# Patient Record
Sex: MALE | Race: BLACK OR AFRICAN AMERICAN | Employment: OTHER | URBAN - METROPOLITAN AREA
[De-identification: names, ages, dates, MRNs, and addresses within clinical notes are randomized per-mention and may not be internally consistent; named-entity substitution may affect disease eponyms.]

---

## 2018-04-22 ENCOUNTER — APPOINTMENT (OUTPATIENT)
Dept: GENERAL RADIOLOGY | Age: 33
DRG: 131 | End: 2018-04-22
Payer: COMMERCIAL

## 2018-04-22 ENCOUNTER — APPOINTMENT (OUTPATIENT)
Dept: CT IMAGING | Age: 33
DRG: 131 | End: 2018-04-22
Payer: COMMERCIAL

## 2018-04-22 ENCOUNTER — ANESTHESIA (OUTPATIENT)
Dept: EMERGENCY DEPT | Age: 33
DRG: 131 | End: 2018-04-22
Payer: COMMERCIAL

## 2018-04-22 ENCOUNTER — ANESTHESIA EVENT (OUTPATIENT)
Dept: EMERGENCY DEPT | Age: 33
DRG: 131 | End: 2018-04-22
Payer: COMMERCIAL

## 2018-04-22 ENCOUNTER — HOSPITAL ENCOUNTER (INPATIENT)
Age: 33
LOS: 6 days | Discharge: HOME | DRG: 131 | End: 2018-04-28
Attending: EMERGENCY MEDICINE | Admitting: SURGERY
Payer: COMMERCIAL
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
## 2018-04-22 DIAGNOSIS — E87.1 HYPONATREMIA: ICD-10-CM

## 2018-04-22 DIAGNOSIS — S02.19XA PTERYGOID PLATE FRACTURE (HCC): ICD-10-CM

## 2018-04-22 DIAGNOSIS — S02.640B OPEN FRACTURE OF RAMUS OF MANDIBLE, UNSPECIFIED LATERALITY, INITIAL ENCOUNTER (HCC): ICD-10-CM

## 2018-04-22 DIAGNOSIS — S00.83XA FACIAL HEMATOMA, INITIAL ENCOUNTER: ICD-10-CM

## 2018-04-22 PROBLEM — S02.609A: Status: ACTIVE | Noted: 2018-04-22

## 2018-04-22 PROBLEM — R60.0 SUBLINGUAL GLAND SWELLING: Status: ACTIVE | Noted: 2018-04-22

## 2018-04-22 PROBLEM — R22.0 SUBLINGUAL GLAND SWELLING: Status: ACTIVE | Noted: 2018-04-22

## 2018-04-22 LAB
ABSOLUTE EOS #: 0.06 K/UL (ref 0–0.44)
ABSOLUTE IMMATURE GRANULOCYTE: <0.03 K/UL (ref 0–0.3)
ABSOLUTE LYMPH #: 3.25 K/UL (ref 1.1–3.7)
ABSOLUTE MONO #: 0.94 K/UL (ref 0.1–1.2)
AMPHETAMINE SCREEN URINE: NEGATIVE
ANION GAP SERPL CALCULATED.3IONS-SCNC: 20 MMOL/L (ref 9–17)
BARBITURATE SCREEN URINE: NEGATIVE
BASOPHILS # BLD: 0 % (ref 0–2)
BASOPHILS ABSOLUTE: 0.03 K/UL (ref 0–0.2)
BENZODIAZEPINE SCREEN, URINE: NEGATIVE
BILIRUBIN URINE: NEGATIVE
BUN BLDV-MCNC: 4 MG/DL (ref 6–20)
BUN/CREAT BLD: ABNORMAL (ref 9–20)
BUPRENORPHINE URINE: ABNORMAL
CALCIUM SERPL-MCNC: 8.4 MG/DL (ref 8.6–10.4)
CANNABINOID SCREEN URINE: NEGATIVE
CHLORIDE BLD-SCNC: 87 MMOL/L (ref 98–107)
CO2: 18 MMOL/L (ref 20–31)
COCAINE METABOLITE, URINE: NEGATIVE
COLOR: YELLOW
COMMENT UA: ABNORMAL
CREAT SERPL-MCNC: 0.57 MG/DL (ref 0.7–1.2)
DIFFERENTIAL TYPE: ABNORMAL
EOSINOPHILS RELATIVE PERCENT: 1 % (ref 1–4)
ETHANOL PERCENT: 0.28 %
ETHANOL: 276 MG/DL
GFR AFRICAN AMERICAN: >60 ML/MIN
GFR NON-AFRICAN AMERICAN: >60 ML/MIN
GFR SERPL CREATININE-BSD FRML MDRD: ABNORMAL ML/MIN/{1.73_M2}
GFR SERPL CREATININE-BSD FRML MDRD: ABNORMAL ML/MIN/{1.73_M2}
GLUCOSE BLD-MCNC: 108 MG/DL (ref 70–99)
GLUCOSE URINE: NEGATIVE
HCT VFR BLD CALC: 39.1 % (ref 40.7–50.3)
HEMOGLOBIN: 14.1 G/DL (ref 13–17)
IMMATURE GRANULOCYTES: 0 %
KETONES, URINE: ABNORMAL
LEUKOCYTE ESTERASE, URINE: NEGATIVE
LYMPHOCYTES # BLD: 43 % (ref 24–43)
MCH RBC QN AUTO: 34.9 PG (ref 25.2–33.5)
MCHC RBC AUTO-ENTMCNC: 36.1 G/DL (ref 28.4–34.8)
MCV RBC AUTO: 96.8 FL (ref 82.6–102.9)
MDMA URINE: ABNORMAL
METHADONE SCREEN, URINE: NEGATIVE
METHAMPHETAMINE, URINE: ABNORMAL
MONOCYTES # BLD: 13 % (ref 3–12)
NITRITE, URINE: NEGATIVE
NRBC AUTOMATED: 0 PER 100 WBC
OPIATES, URINE: POSITIVE
OXYCODONE SCREEN URINE: NEGATIVE
PDW BLD-RTO: 11.8 % (ref 11.8–14.4)
PH UA: 5 (ref 5–8)
PHENCYCLIDINE, URINE: NEGATIVE
PLATELET # BLD: 171 K/UL (ref 138–453)
PLATELET ESTIMATE: ABNORMAL
PMV BLD AUTO: 9.2 FL (ref 8.1–13.5)
POTASSIUM SERPL-SCNC: 4 MMOL/L (ref 3.7–5.3)
PROPOXYPHENE, URINE: ABNORMAL
PROTEIN UA: NEGATIVE
RBC # BLD: 4.04 M/UL (ref 4.21–5.77)
RBC # BLD: ABNORMAL 10*6/UL
SEG NEUTROPHILS: 43 % (ref 36–65)
SEGMENTED NEUTROPHILS ABSOLUTE COUNT: 3.19 K/UL (ref 1.5–8.1)
SODIUM BLD-SCNC: 125 MMOL/L (ref 135–144)
SPECIFIC GRAVITY UA: 1.01 (ref 1–1.03)
TEST INFORMATION: ABNORMAL
TRICYCLIC ANTIDEPRESSANTS, UR: ABNORMAL
TURBIDITY: CLEAR
URINE HGB: NEGATIVE
UROBILINOGEN, URINE: NORMAL
WBC # BLD: 7.5 K/UL (ref 3.5–11.3)
WBC # BLD: ABNORMAL 10*3/UL

## 2018-04-22 PROCEDURE — 96376 TX/PRO/DX INJ SAME DRUG ADON: CPT

## 2018-04-22 PROCEDURE — 6360000002 HC RX W HCPCS: Performed by: EMERGENCY MEDICINE

## 2018-04-22 PROCEDURE — 2000000000 HC ICU R&B

## 2018-04-22 PROCEDURE — 80048 BASIC METABOLIC PNL TOTAL CA: CPT

## 2018-04-22 PROCEDURE — 31500 INSERT EMERGENCY AIRWAY: CPT | Performed by: ANESTHESIOLOGY

## 2018-04-22 PROCEDURE — 80307 DRUG TEST PRSMV CHEM ANLYZR: CPT

## 2018-04-22 PROCEDURE — 2060000000 HC ICU INTERMEDIATE R&B

## 2018-04-22 PROCEDURE — 2580000003 HC RX 258: Performed by: EMERGENCY MEDICINE

## 2018-04-22 PROCEDURE — 94002 VENT MGMT INPAT INIT DAY: CPT

## 2018-04-22 PROCEDURE — 99285 EMERGENCY DEPT VISIT HI MDM: CPT

## 2018-04-22 PROCEDURE — 85025 COMPLETE CBC W/AUTO DIFF WBC: CPT

## 2018-04-22 PROCEDURE — 0BH18EZ INSERTION OF ENDOTRACHEAL AIRWAY INTO TRACHEA, VIA NATURAL OR ARTIFICIAL OPENING ENDOSCOPIC: ICD-10-PCS | Performed by: ANESTHESIOLOGY

## 2018-04-22 PROCEDURE — 70450 CT HEAD/BRAIN W/O DYE: CPT

## 2018-04-22 PROCEDURE — 87641 MR-STAPH DNA AMP PROBE: CPT

## 2018-04-22 PROCEDURE — G0480 DRUG TEST DEF 1-7 CLASSES: HCPCS

## 2018-04-22 PROCEDURE — 6360000002 HC RX W HCPCS: Performed by: STUDENT IN AN ORGANIZED HEALTH CARE EDUCATION/TRAINING PROGRAM

## 2018-04-22 PROCEDURE — 6360000002 HC RX W HCPCS

## 2018-04-22 PROCEDURE — 94762 N-INVAS EAR/PLS OXIMTRY CONT: CPT

## 2018-04-22 PROCEDURE — 6370000000 HC RX 637 (ALT 250 FOR IP): Performed by: STUDENT IN AN ORGANIZED HEALTH CARE EDUCATION/TRAINING PROGRAM

## 2018-04-22 PROCEDURE — 96374 THER/PROPH/DIAG INJ IV PUSH: CPT

## 2018-04-22 PROCEDURE — 72125 CT NECK SPINE W/O DYE: CPT

## 2018-04-22 PROCEDURE — 70486 CT MAXILLOFACIAL W/O DYE: CPT

## 2018-04-22 PROCEDURE — 71045 X-RAY EXAM CHEST 1 VIEW: CPT

## 2018-04-22 PROCEDURE — 92950 HEART/LUNG RESUSCITATION CPR: CPT

## 2018-04-22 PROCEDURE — 81003 URINALYSIS AUTO W/O SCOPE: CPT

## 2018-04-22 PROCEDURE — 96375 TX/PRO/DX INJ NEW DRUG ADDON: CPT

## 2018-04-22 PROCEDURE — 94770 HC ETCO2 MONITOR DAILY: CPT

## 2018-04-22 PROCEDURE — 87086 URINE CULTURE/COLONY COUNT: CPT

## 2018-04-22 PROCEDURE — 5A1955Z RESPIRATORY VENTILATION, GREATER THAN 96 CONSECUTIVE HOURS: ICD-10-PCS | Performed by: SURGERY

## 2018-04-22 RX ORDER — FENTANYL CITRATE 50 UG/ML
50 INJECTION, SOLUTION INTRAMUSCULAR; INTRAVENOUS
Status: DISCONTINUED | OUTPATIENT
Start: 2018-04-22 | End: 2018-04-22

## 2018-04-22 RX ORDER — FENTANYL CITRATE 50 UG/ML
50 INJECTION, SOLUTION INTRAMUSCULAR; INTRAVENOUS ONCE
Status: COMPLETED | OUTPATIENT
Start: 2018-04-22 | End: 2018-04-22

## 2018-04-22 RX ORDER — FENTANYL CITRATE 50 UG/ML
50 INJECTION, SOLUTION INTRAMUSCULAR; INTRAVENOUS
Status: DISCONTINUED | OUTPATIENT
Start: 2018-04-22 | End: 2018-04-24

## 2018-04-22 RX ORDER — PROPOFOL 10 MG/ML
INJECTION, EMULSION INTRAVENOUS
Status: COMPLETED
Start: 2018-04-22 | End: 2018-04-22

## 2018-04-22 RX ORDER — OXYCODONE HCL 5 MG/5 ML
5 SOLUTION, ORAL ORAL EVERY 4 HOURS PRN
Status: DISCONTINUED | OUTPATIENT
Start: 2018-04-22 | End: 2018-04-28 | Stop reason: HOSPADM

## 2018-04-22 RX ORDER — FENTANYL CITRATE 50 UG/ML
INJECTION, SOLUTION INTRAMUSCULAR; INTRAVENOUS
Status: DISCONTINUED
Start: 2018-04-22 | End: 2018-04-22

## 2018-04-22 RX ORDER — SODIUM CHLORIDE 0.9 % (FLUSH) 0.9 %
10 SYRINGE (ML) INJECTION EVERY 12 HOURS SCHEDULED
Status: DISCONTINUED | OUTPATIENT
Start: 2018-04-22 | End: 2018-04-28 | Stop reason: HOSPADM

## 2018-04-22 RX ORDER — SODIUM CHLORIDE 0.9 % (FLUSH) 0.9 %
10 SYRINGE (ML) INJECTION PRN
Status: DISCONTINUED | OUTPATIENT
Start: 2018-04-22 | End: 2018-04-28 | Stop reason: HOSPADM

## 2018-04-22 RX ORDER — ONDANSETRON 2 MG/ML
4 INJECTION INTRAMUSCULAR; INTRAVENOUS EVERY 6 HOURS PRN
Status: DISCONTINUED | OUTPATIENT
Start: 2018-04-22 | End: 2018-04-28 | Stop reason: HOSPADM

## 2018-04-22 RX ORDER — 0.9 % SODIUM CHLORIDE 0.9 %
1000 INTRAVENOUS SOLUTION INTRAVENOUS ONCE
Status: COMPLETED | OUTPATIENT
Start: 2018-04-22 | End: 2018-04-22

## 2018-04-22 RX ORDER — PROPOFOL 10 MG/ML
10 INJECTION, EMULSION INTRAVENOUS
Status: DISCONTINUED | OUTPATIENT
Start: 2018-04-23 | End: 2018-04-27

## 2018-04-22 RX ORDER — MORPHINE SULFATE 4 MG/ML
4 INJECTION, SOLUTION INTRAMUSCULAR; INTRAVENOUS ONCE
Status: COMPLETED | OUTPATIENT
Start: 2018-04-22 | End: 2018-04-22

## 2018-04-22 RX ORDER — FENTANYL CITRATE 50 UG/ML
100 INJECTION, SOLUTION INTRAMUSCULAR; INTRAVENOUS
Status: DISCONTINUED | OUTPATIENT
Start: 2018-04-22 | End: 2018-04-25

## 2018-04-22 RX ORDER — OXYCODONE HCL 5 MG/5 ML
10 SOLUTION, ORAL ORAL EVERY 4 HOURS PRN
Status: DISCONTINUED | OUTPATIENT
Start: 2018-04-22 | End: 2018-04-28 | Stop reason: HOSPADM

## 2018-04-22 RX ORDER — ACETAMINOPHEN 325 MG/1
650 TABLET ORAL EVERY 4 HOURS PRN
Status: DISCONTINUED | OUTPATIENT
Start: 2018-04-22 | End: 2018-04-27

## 2018-04-22 RX ORDER — ONDANSETRON 2 MG/ML
4 INJECTION INTRAMUSCULAR; INTRAVENOUS ONCE
Status: COMPLETED | OUTPATIENT
Start: 2018-04-22 | End: 2018-04-22

## 2018-04-22 RX ORDER — SODIUM CHLORIDE 9 MG/ML
INJECTION, SOLUTION INTRAVENOUS CONTINUOUS
Status: DISCONTINUED | OUTPATIENT
Start: 2018-04-22 | End: 2018-04-23

## 2018-04-22 RX ADMIN — SODIUM CHLORIDE: 9 INJECTION, SOLUTION INTRAVENOUS at 22:26

## 2018-04-22 RX ADMIN — PROPOFOL 50 MCG/KG/MIN: 10 INJECTION, EMULSION INTRAVENOUS at 23:44

## 2018-04-22 RX ADMIN — PROPOFOL 1000 MG: 10 INJECTION, EMULSION INTRAVENOUS at 21:48

## 2018-04-22 RX ADMIN — FENTANYL CITRATE 50 MCG: 50 INJECTION INTRAMUSCULAR; INTRAVENOUS at 14:37

## 2018-04-22 RX ADMIN — SODIUM CHLORIDE 1000 ML: 9 INJECTION, SOLUTION INTRAVENOUS at 13:55

## 2018-04-22 RX ADMIN — OXYCODONE HYDROCHLORIDE 10 MG: 5 SOLUTION ORAL at 23:43

## 2018-04-22 RX ADMIN — FENTANYL CITRATE 50 MCG: 50 INJECTION INTRAMUSCULAR; INTRAVENOUS at 17:27

## 2018-04-22 RX ADMIN — FENTANYL CITRATE 50 MCG: 50 INJECTION INTRAMUSCULAR; INTRAVENOUS at 23:15

## 2018-04-22 RX ADMIN — ONDANSETRON 4 MG: 2 INJECTION INTRAMUSCULAR; INTRAVENOUS at 14:37

## 2018-04-22 RX ADMIN — FENTANYL CITRATE 50 MCG: 50 INJECTION, SOLUTION INTRAMUSCULAR; INTRAVENOUS at 21:47

## 2018-04-22 RX ADMIN — MORPHINE SULFATE 4 MG: 4 INJECTION INTRAVENOUS at 19:08

## 2018-04-22 ASSESSMENT — PAIN SCALES - GENERAL
PAINLEVEL_OUTOF10: 10

## 2018-04-22 ASSESSMENT — ENCOUNTER SYMPTOMS
SINUS PAIN: 1
SORE THROAT: 0
ABDOMINAL PAIN: 0
WHEEZING: 0
CONSTIPATION: 0
FACIAL SWELLING: 1
SINUS PRESSURE: 0
SHORTNESS OF BREATH: 0
PHOTOPHOBIA: 0
TROUBLE SWALLOWING: 0
COUGH: 0
VOMITING: 0
STRIDOR: 0
NAUSEA: 0
DIARRHEA: 0

## 2018-04-22 ASSESSMENT — PAIN DESCRIPTION - LOCATION: LOCATION: FACE

## 2018-04-22 ASSESSMENT — PULMONARY FUNCTION TESTS
PIF_VALUE: 20
PIF_VALUE: 21

## 2018-04-22 ASSESSMENT — PAIN DESCRIPTION - ORIENTATION: ORIENTATION: LEFT

## 2018-04-22 ASSESSMENT — PAIN DESCRIPTION - DESCRIPTORS: DESCRIPTORS: DISCOMFORT;SHARP;ACHING

## 2018-04-23 ENCOUNTER — APPOINTMENT (OUTPATIENT)
Dept: CT IMAGING | Age: 33
DRG: 131 | End: 2018-04-23
Payer: COMMERCIAL

## 2018-04-23 PROBLEM — S02.2XXA NASAL BONE FRACTURE: Status: ACTIVE | Noted: 2018-04-23

## 2018-04-23 PROBLEM — J96.90 RESPIRATORY FAILURE AFTER TRAUMA (HCC): Status: ACTIVE | Noted: 2018-04-23
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
## 2018-04-23 PROBLEM — E87.1 HYPONATREMIA: Status: ACTIVE | Noted: 2018-04-23

## 2018-04-23 PROBLEM — S02.19XA PTERYGOID PLATE FRACTURE (HCC): Status: ACTIVE | Noted: 2018-04-23

## 2018-04-23 LAB
ABSOLUTE EOS #: <0.03 K/UL (ref 0–0.44)
ABSOLUTE IMMATURE GRANULOCYTE: 0.04 K/UL (ref 0–0.3)
ABSOLUTE LYMPH #: 1 K/UL (ref 1.1–3.7)
ABSOLUTE MONO #: 0.78 K/UL (ref 0.1–1.2)
ALLEN TEST: POSITIVE
ANION GAP SERPL CALCULATED.3IONS-SCNC: 18 MMOL/L (ref 9–17)
BASOPHILS # BLD: 0 % (ref 0–2)
BASOPHILS ABSOLUTE: <0.03 K/UL (ref 0–0.2)
BUN BLDV-MCNC: 2 MG/DL (ref 6–20)
BUN/CREAT BLD: ABNORMAL (ref 9–20)
CALCIUM SERPL-MCNC: 8.6 MG/DL (ref 8.6–10.4)
CHLORIDE BLD-SCNC: 93 MMOL/L (ref 98–107)
CO2: 21 MMOL/L (ref 20–31)
CREAT SERPL-MCNC: 0.68 MG/DL (ref 0.7–1.2)
CULTURE: NORMAL
CULTURE: NORMAL
DIFFERENTIAL TYPE: ABNORMAL
EOSINOPHILS RELATIVE PERCENT: 0 % (ref 1–4)
FIO2: 30
GFR AFRICAN AMERICAN: >60 ML/MIN
GFR NON-AFRICAN AMERICAN: >60 ML/MIN
GFR SERPL CREATININE-BSD FRML MDRD: ABNORMAL ML/MIN/{1.73_M2}
GFR SERPL CREATININE-BSD FRML MDRD: ABNORMAL ML/MIN/{1.73_M2}
GLUCOSE BLD-MCNC: 105 MG/DL (ref 70–99)
GLUCOSE BLD-MCNC: 116 MG/DL (ref 74–100)
HCT VFR BLD CALC: 33.6 % (ref 40.7–50.3)
HEMOGLOBIN: 11.8 G/DL (ref 13–17)
IMMATURE GRANULOCYTES: 1 %
INR BLD: 1.1
LYMPHOCYTES # BLD: 18 % (ref 24–43)
Lab: NORMAL
MCH RBC QN AUTO: 34.5 PG (ref 25.2–33.5)
MCHC RBC AUTO-ENTMCNC: 35.1 G/DL (ref 28.4–34.8)
MCV RBC AUTO: 98.2 FL (ref 82.6–102.9)
MODE: ABNORMAL
MONOCYTES # BLD: 14 % (ref 3–12)
MRSA, DNA, NASAL: NORMAL
NEGATIVE BASE EXCESS, ART: 2 (ref 0–2)
NRBC AUTOMATED: 0 PER 100 WBC
O2 DEVICE/FLOW/%: ABNORMAL
PATIENT TEMP: 37.9
PDW BLD-RTO: 12.2 % (ref 11.8–14.4)
PLATELET # BLD: 123 K/UL (ref 138–453)
PLATELET ESTIMATE: ABNORMAL
PMV BLD AUTO: 9 FL (ref 8.1–13.5)
POC HCO3: 21 MMOL/L (ref 21–28)
POC O2 SATURATION: 99 % (ref 94–98)
POC PCO2 TEMP: 30 MM HG
POC PCO2: 28.8 MM HG (ref 35–48)
POC PH TEMP: 7.46
POC PH: 7.47 (ref 7.35–7.45)
POC PO2 TEMP: 147 MM HG
POC PO2: 141.8 MM HG (ref 83–108)
POSITIVE BASE EXCESS, ART: ABNORMAL (ref 0–3)
POTASSIUM SERPL-SCNC: 4.1 MMOL/L (ref 3.7–5.3)
PROTHROMBIN TIME: 11.2 SEC (ref 9–12)
RBC # BLD: 3.42 M/UL (ref 4.21–5.77)
RBC # BLD: ABNORMAL 10*6/UL
SAMPLE SITE: ABNORMAL
SEG NEUTROPHILS: 67 % (ref 36–65)
SEGMENTED NEUTROPHILS ABSOLUTE COUNT: 3.71 K/UL (ref 1.5–8.1)
SODIUM BLD-SCNC: 132 MMOL/L (ref 135–144)
SPECIMEN DESCRIPTION: NORMAL
SPECIMEN DESCRIPTION: NORMAL
STATUS: NORMAL
TCO2 (CALC), ART: 22 MMOL/L (ref 22–29)
WBC # BLD: 5.5 K/UL (ref 3.5–11.3)
WBC # BLD: ABNORMAL 10*3/UL

## 2018-04-23 PROCEDURE — 82803 BLOOD GASES ANY COMBINATION: CPT

## 2018-04-23 PROCEDURE — 2000000000 HC ICU R&B

## 2018-04-23 PROCEDURE — 85025 COMPLETE CBC W/AUTO DIFF WBC: CPT

## 2018-04-23 PROCEDURE — 94770 HC ETCO2 MONITOR DAILY: CPT

## 2018-04-23 PROCEDURE — 36600 WITHDRAWAL OF ARTERIAL BLOOD: CPT

## 2018-04-23 PROCEDURE — 94003 VENT MGMT INPAT SUBQ DAY: CPT

## 2018-04-23 PROCEDURE — 6370000000 HC RX 637 (ALT 250 FOR IP): Performed by: NURSE PRACTITIONER

## 2018-04-23 PROCEDURE — 2500000003 HC RX 250 WO HCPCS: Performed by: STUDENT IN AN ORGANIZED HEALTH CARE EDUCATION/TRAINING PROGRAM

## 2018-04-23 PROCEDURE — 6360000002 HC RX W HCPCS: Performed by: STUDENT IN AN ORGANIZED HEALTH CARE EDUCATION/TRAINING PROGRAM

## 2018-04-23 PROCEDURE — 51798 US URINE CAPACITY MEASURE: CPT

## 2018-04-23 PROCEDURE — 2580000003 HC RX 258: Performed by: EMERGENCY MEDICINE

## 2018-04-23 PROCEDURE — 85610 PROTHROMBIN TIME: CPT

## 2018-04-23 PROCEDURE — 6370000000 HC RX 637 (ALT 250 FOR IP): Performed by: STUDENT IN AN ORGANIZED HEALTH CARE EDUCATION/TRAINING PROGRAM

## 2018-04-23 PROCEDURE — 76376 3D RENDER W/INTRP POSTPROCES: CPT

## 2018-04-23 PROCEDURE — 80048 BASIC METABOLIC PNL TOTAL CA: CPT

## 2018-04-23 PROCEDURE — 94762 N-INVAS EAR/PLS OXIMTRY CONT: CPT

## 2018-04-23 PROCEDURE — 6360000002 HC RX W HCPCS: Performed by: EMERGENCY MEDICINE

## 2018-04-23 PROCEDURE — S0028 INJECTION, FAMOTIDINE, 20 MG: HCPCS | Performed by: STUDENT IN AN ORGANIZED HEALTH CARE EDUCATION/TRAINING PROGRAM

## 2018-04-23 PROCEDURE — 36415 COLL VENOUS BLD VENIPUNCTURE: CPT

## 2018-04-23 PROCEDURE — 2500000003 HC RX 250 WO HCPCS: Performed by: NURSE PRACTITIONER

## 2018-04-23 PROCEDURE — 82947 ASSAY GLUCOSE BLOOD QUANT: CPT

## 2018-04-23 RX ORDER — FAMOTIDINE 20 MG/1
20 TABLET, FILM COATED ORAL 2 TIMES DAILY
Status: DISCONTINUED | OUTPATIENT
Start: 2018-04-23 | End: 2018-04-24

## 2018-04-23 RX ORDER — DEXTROSE, SODIUM CHLORIDE, AND POTASSIUM CHLORIDE 5; .45; .15 G/100ML; G/100ML; G/100ML
INJECTION INTRAVENOUS CONTINUOUS
Status: DISCONTINUED | OUTPATIENT
Start: 2018-04-23 | End: 2018-04-24

## 2018-04-23 RX ADMIN — FAMOTIDINE 20 MG: 20 TABLET, FILM COATED ORAL at 20:33

## 2018-04-23 RX ADMIN — PROPOFOL 50 MCG/KG/MIN: 10 INJECTION, EMULSION INTRAVENOUS at 22:42

## 2018-04-23 RX ADMIN — AMPICILLIN SODIUM AND SULBACTAM SODIUM 1.5 G: 1; .5 INJECTION, POWDER, FOR SOLUTION INTRAMUSCULAR; INTRAVENOUS at 10:04

## 2018-04-23 RX ADMIN — FENTANYL CITRATE 100 MCG: 50 INJECTION, SOLUTION INTRAMUSCULAR; INTRAVENOUS at 16:37

## 2018-04-23 RX ADMIN — DOCUSATE SODIUM 100 MG: 50 LIQUID ORAL at 08:15

## 2018-04-23 RX ADMIN — FENTANYL CITRATE 100 MCG: 50 INJECTION, SOLUTION INTRAMUSCULAR; INTRAVENOUS at 12:32

## 2018-04-23 RX ADMIN — AMPICILLIN SODIUM AND SULBACTAM SODIUM 1.5 G: 1; .5 INJECTION, POWDER, FOR SOLUTION INTRAMUSCULAR; INTRAVENOUS at 22:43

## 2018-04-23 RX ADMIN — OXYCODONE HYDROCHLORIDE 10 MG: 5 SOLUTION ORAL at 08:58

## 2018-04-23 RX ADMIN — PROPOFOL 50 MCG/KG/MIN: 10 INJECTION, EMULSION INTRAVENOUS at 05:45

## 2018-04-23 RX ADMIN — SODIUM CHLORIDE: 9 INJECTION, SOLUTION INTRAVENOUS at 05:47

## 2018-04-23 RX ADMIN — FENTANYL CITRATE 100 MCG: 50 INJECTION, SOLUTION INTRAMUSCULAR; INTRAVENOUS at 20:17

## 2018-04-23 RX ADMIN — OXYCODONE HYDROCHLORIDE 10 MG: 5 SOLUTION ORAL at 18:36

## 2018-04-23 RX ADMIN — FENTANYL CITRATE 100 MCG: 50 INJECTION, SOLUTION INTRAMUSCULAR; INTRAVENOUS at 22:00

## 2018-04-23 RX ADMIN — POTASSIUM CHLORIDE, DEXTROSE MONOHYDRATE AND SODIUM CHLORIDE: 150; 5; 450 INJECTION, SOLUTION INTRAVENOUS at 13:01

## 2018-04-23 RX ADMIN — PROPOFOL 40 MCG/KG/MIN: 10 INJECTION, EMULSION INTRAVENOUS at 17:40

## 2018-04-23 RX ADMIN — AMPICILLIN SODIUM AND SULBACTAM SODIUM 1.5 G: 1; .5 INJECTION, POWDER, FOR SOLUTION INTRAMUSCULAR; INTRAVENOUS at 00:55

## 2018-04-23 RX ADMIN — PROPOFOL 40 MCG/KG/MIN: 10 INJECTION, EMULSION INTRAVENOUS at 11:52

## 2018-04-23 RX ADMIN — AMPICILLIN SODIUM AND SULBACTAM SODIUM 1.5 G: 1; .5 INJECTION, POWDER, FOR SOLUTION INTRAMUSCULAR; INTRAVENOUS at 05:43

## 2018-04-23 RX ADMIN — DOCUSATE SODIUM 100 MG: 50 LIQUID ORAL at 20:33

## 2018-04-23 RX ADMIN — OXYCODONE HYDROCHLORIDE 10 MG: 5 SOLUTION ORAL at 23:13

## 2018-04-23 RX ADMIN — FENTANYL CITRATE 100 MCG: 50 INJECTION, SOLUTION INTRAMUSCULAR; INTRAVENOUS at 07:40

## 2018-04-23 RX ADMIN — Medication 10 ML: at 08:14

## 2018-04-23 RX ADMIN — FENTANYL CITRATE 100 MCG: 50 INJECTION, SOLUTION INTRAMUSCULAR; INTRAVENOUS at 05:44

## 2018-04-23 RX ADMIN — FENTANYL CITRATE 100 MCG: 50 INJECTION, SOLUTION INTRAMUSCULAR; INTRAVENOUS at 14:36

## 2018-04-23 RX ADMIN — OXYCODONE HYDROCHLORIDE 10 MG: 5 SOLUTION ORAL at 03:51

## 2018-04-23 RX ADMIN — FAMOTIDINE 20 MG: 10 INJECTION INTRAVENOUS at 08:15

## 2018-04-23 RX ADMIN — FENTANYL CITRATE 100 MCG: 50 INJECTION, SOLUTION INTRAMUSCULAR; INTRAVENOUS at 10:06

## 2018-04-23 RX ADMIN — AMPICILLIN SODIUM AND SULBACTAM SODIUM 1.5 G: 1; .5 INJECTION, POWDER, FOR SOLUTION INTRAMUSCULAR; INTRAVENOUS at 16:38

## 2018-04-23 RX ADMIN — OXYCODONE HYDROCHLORIDE 10 MG: 5 SOLUTION ORAL at 13:01

## 2018-04-23 ASSESSMENT — PAIN DESCRIPTION - LOCATION
LOCATION: FACE

## 2018-04-23 ASSESSMENT — PULMONARY FUNCTION TESTS
PIF_VALUE: 20
PIF_VALUE: 20
PIF_VALUE: 11
PIF_VALUE: 23
PIF_VALUE: 11
PIF_VALUE: 18

## 2018-04-23 ASSESSMENT — PAIN DESCRIPTION - PAIN TYPE
TYPE: ACUTE PAIN

## 2018-04-23 ASSESSMENT — PAIN DESCRIPTION - ORIENTATION
ORIENTATION: LEFT

## 2018-04-23 ASSESSMENT — PAIN SCALES - GENERAL
PAINLEVEL_OUTOF10: 9
PAINLEVEL_OUTOF10: 10
PAINLEVEL_OUTOF10: 9

## 2018-04-24 ENCOUNTER — ANESTHESIA EVENT (OUTPATIENT)
Dept: OPERATING ROOM | Age: 33
DRG: 131 | End: 2018-04-24
Payer: COMMERCIAL

## 2018-04-24 PROBLEM — B20 HIV (HUMAN IMMUNODEFICIENCY VIRUS INFECTION) (HCC): Status: ACTIVE | Noted: 2018-04-24

## 2018-04-24 PROBLEM — S02.19XA PTERYGOID PLATE FRACTURE (HCC): Status: RESOLVED | Noted: 2018-04-23 | Resolved: 2018-04-24

## 2018-04-24 PROBLEM — R60.0 SUBLINGUAL GLAND SWELLING: Status: RESOLVED | Noted: 2018-04-22 | Resolved: 2018-04-24

## 2018-04-24 PROBLEM — S02.2XXA NASAL BONE FRACTURE: Status: RESOLVED | Noted: 2018-04-23 | Resolved: 2018-04-24

## 2018-04-24 PROBLEM — J96.90 RESPIRATORY FAILURE AFTER TRAUMA (HCC): Status: RESOLVED | Noted: 2018-04-23 | Resolved: 2018-04-24
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
## 2018-04-24 PROBLEM — Z21 HIV (HUMAN IMMUNODEFICIENCY VIRUS INFECTION) (HCC): Status: ACTIVE | Noted: 2018-04-24

## 2018-04-24 PROBLEM — R22.0 SUBLINGUAL GLAND SWELLING: Status: RESOLVED | Noted: 2018-04-22 | Resolved: 2018-04-24

## 2018-04-24 PROBLEM — E87.1 HYPONATREMIA: Status: RESOLVED | Noted: 2018-04-23 | Resolved: 2018-04-24

## 2018-04-24 LAB
ABSOLUTE EOS #: 0.03 K/UL (ref 0–0.44)
ABSOLUTE IMMATURE GRANULOCYTE: <0.03 K/UL (ref 0–0.3)
ABSOLUTE LYMPH #: 0.86 K/UL (ref 1.1–3.7)
ABSOLUTE MONO #: 0.49 K/UL (ref 0.1–1.2)
ALLEN TEST: POSITIVE
ANION GAP SERPL CALCULATED.3IONS-SCNC: 14 MMOL/L (ref 9–17)
BASOPHILS # BLD: 1 % (ref 0–2)
BASOPHILS ABSOLUTE: 0.03 K/UL (ref 0–0.2)
BUN BLDV-MCNC: 2 MG/DL (ref 6–20)
BUN/CREAT BLD: ABNORMAL (ref 9–20)
CALCIUM SERPL-MCNC: 8.5 MG/DL (ref 8.6–10.4)
CHLORIDE BLD-SCNC: 97 MMOL/L (ref 98–107)
CO2: 24 MMOL/L (ref 20–31)
CREAT SERPL-MCNC: 0.6 MG/DL (ref 0.7–1.2)
DIFFERENTIAL TYPE: ABNORMAL
EOSINOPHILS RELATIVE PERCENT: 1 % (ref 1–4)
FIO2: 30
GFR AFRICAN AMERICAN: >60 ML/MIN
GFR NON-AFRICAN AMERICAN: >60 ML/MIN
GFR SERPL CREATININE-BSD FRML MDRD: ABNORMAL ML/MIN/{1.73_M2}
GFR SERPL CREATININE-BSD FRML MDRD: ABNORMAL ML/MIN/{1.73_M2}
GLUCOSE BLD-MCNC: 108 MG/DL (ref 70–99)
HCT VFR BLD CALC: 30.9 % (ref 40.7–50.3)
HEMOGLOBIN: 10.5 G/DL (ref 13–17)
IMMATURE GRANULOCYTES: 0 %
LYMPHOCYTES # BLD: 21 % (ref 24–43)
MAGNESIUM: 1.9 MG/DL (ref 1.6–2.6)
MCH RBC QN AUTO: 34.7 PG (ref 25.2–33.5)
MCHC RBC AUTO-ENTMCNC: 34 G/DL (ref 28.4–34.8)
MCV RBC AUTO: 102 FL (ref 82.6–102.9)
MODE: ABNORMAL
MONOCYTES # BLD: 12 % (ref 3–12)
NEGATIVE BASE EXCESS, ART: ABNORMAL (ref 0–2)
NRBC AUTOMATED: 0 PER 100 WBC
O2 DEVICE/FLOW/%: ABNORMAL
PATIENT TEMP: ABNORMAL
PDW BLD-RTO: 12.4 % (ref 11.8–14.4)
PLATELET # BLD: 121 K/UL (ref 138–453)
PLATELET ESTIMATE: ABNORMAL
PMV BLD AUTO: 9.9 FL (ref 8.1–13.5)
POC HCO3: 27.1 MMOL/L (ref 21–28)
POC O2 SATURATION: 99 % (ref 94–98)
POC PCO2 TEMP: ABNORMAL MM HG
POC PCO2: 37.6 MM HG (ref 35–48)
POC PH TEMP: ABNORMAL
POC PH: 7.47 (ref 7.35–7.45)
POC PO2 TEMP: ABNORMAL MM HG
POC PO2: 121.8 MM HG (ref 83–108)
POSITIVE BASE EXCESS, ART: 3 (ref 0–3)
POTASSIUM SERPL-SCNC: 3.2 MMOL/L (ref 3.7–5.3)
RBC # BLD: 3.03 M/UL (ref 4.21–5.77)
RBC # BLD: ABNORMAL 10*6/UL
SAMPLE SITE: ABNORMAL
SEG NEUTROPHILS: 65 % (ref 36–65)
SEGMENTED NEUTROPHILS ABSOLUTE COUNT: 2.59 K/UL (ref 1.5–8.1)
SODIUM BLD-SCNC: 135 MMOL/L (ref 135–144)
TCO2 (CALC), ART: 28 MMOL/L (ref 22–29)
WBC # BLD: 4 K/UL (ref 3.5–11.3)
WBC # BLD: ABNORMAL 10*3/UL

## 2018-04-24 PROCEDURE — 83735 ASSAY OF MAGNESIUM: CPT

## 2018-04-24 PROCEDURE — 36600 WITHDRAWAL OF ARTERIAL BLOOD: CPT

## 2018-04-24 PROCEDURE — 6360000002 HC RX W HCPCS: Performed by: STUDENT IN AN ORGANIZED HEALTH CARE EDUCATION/TRAINING PROGRAM

## 2018-04-24 PROCEDURE — 85025 COMPLETE CBC W/AUTO DIFF WBC: CPT

## 2018-04-24 PROCEDURE — 82803 BLOOD GASES ANY COMBINATION: CPT

## 2018-04-24 PROCEDURE — 97530 THERAPEUTIC ACTIVITIES: CPT

## 2018-04-24 PROCEDURE — 6360000002 HC RX W HCPCS: Performed by: NURSE PRACTITIONER

## 2018-04-24 PROCEDURE — 94770 HC ETCO2 MONITOR DAILY: CPT

## 2018-04-24 PROCEDURE — 6370000000 HC RX 637 (ALT 250 FOR IP): Performed by: STUDENT IN AN ORGANIZED HEALTH CARE EDUCATION/TRAINING PROGRAM

## 2018-04-24 PROCEDURE — 97166 OT EVAL MOD COMPLEX 45 MIN: CPT

## 2018-04-24 PROCEDURE — 36415 COLL VENOUS BLD VENIPUNCTURE: CPT

## 2018-04-24 PROCEDURE — G8988 SELF CARE GOAL STATUS: HCPCS

## 2018-04-24 PROCEDURE — 2580000003 HC RX 258: Performed by: STUDENT IN AN ORGANIZED HEALTH CARE EDUCATION/TRAINING PROGRAM

## 2018-04-24 PROCEDURE — 2580000003 HC RX 258: Performed by: EMERGENCY MEDICINE

## 2018-04-24 PROCEDURE — 2500000003 HC RX 250 WO HCPCS: Performed by: NURSE PRACTITIONER

## 2018-04-24 PROCEDURE — 94762 N-INVAS EAR/PLS OXIMTRY CONT: CPT

## 2018-04-24 PROCEDURE — 6370000000 HC RX 637 (ALT 250 FOR IP): Performed by: NURSE PRACTITIONER

## 2018-04-24 PROCEDURE — 80048 BASIC METABOLIC PNL TOTAL CA: CPT

## 2018-04-24 PROCEDURE — 94003 VENT MGMT INPAT SUBQ DAY: CPT

## 2018-04-24 PROCEDURE — 6360000002 HC RX W HCPCS: Performed by: EMERGENCY MEDICINE

## 2018-04-24 PROCEDURE — G8987 SELF CARE CURRENT STATUS: HCPCS

## 2018-04-24 PROCEDURE — 2000000000 HC ICU R&B

## 2018-04-24 RX ORDER — SODIUM CHLORIDE, SODIUM LACTATE, POTASSIUM CHLORIDE, CALCIUM CHLORIDE 600; 310; 30; 20 MG/100ML; MG/100ML; MG/100ML; MG/100ML
INJECTION, SOLUTION INTRAVENOUS CONTINUOUS
Status: DISCONTINUED | OUTPATIENT
Start: 2018-04-24 | End: 2018-04-27

## 2018-04-24 RX ORDER — HEPARIN SODIUM 5000 [USP'U]/ML
5000 INJECTION, SOLUTION INTRAVENOUS; SUBCUTANEOUS EVERY 8 HOURS SCHEDULED
Status: DISCONTINUED | OUTPATIENT
Start: 2018-04-24 | End: 2018-04-28 | Stop reason: HOSPADM

## 2018-04-24 RX ORDER — POTASSIUM CHLORIDE 7.45 MG/ML
10 INJECTION INTRAVENOUS PRN
Status: DISCONTINUED | OUTPATIENT
Start: 2018-04-24 | End: 2018-04-25

## 2018-04-24 RX ORDER — POTASSIUM CHLORIDE 1.5 G/1.77G
20 POWDER, FOR SOLUTION ORAL 2 TIMES DAILY
Status: DISCONTINUED | OUTPATIENT
Start: 2018-04-24 | End: 2018-04-27

## 2018-04-24 RX ADMIN — PROPOFOL 50 MCG/KG/MIN: 10 INJECTION, EMULSION INTRAVENOUS at 15:14

## 2018-04-24 RX ADMIN — OXYCODONE HYDROCHLORIDE 10 MG: 5 SOLUTION ORAL at 04:13

## 2018-04-24 RX ADMIN — DOCUSATE SODIUM 100 MG: 50 LIQUID ORAL at 07:44

## 2018-04-24 RX ADMIN — AMPICILLIN SODIUM AND SULBACTAM SODIUM 1.5 G: 1; .5 INJECTION, POWDER, FOR SOLUTION INTRAMUSCULAR; INTRAVENOUS at 22:20

## 2018-04-24 RX ADMIN — AMPICILLIN SODIUM AND SULBACTAM SODIUM 1.5 G: 1; .5 INJECTION, POWDER, FOR SOLUTION INTRAMUSCULAR; INTRAVENOUS at 15:53

## 2018-04-24 RX ADMIN — AMPICILLIN SODIUM AND SULBACTAM SODIUM 1.5 G: 1; .5 INJECTION, POWDER, FOR SOLUTION INTRAMUSCULAR; INTRAVENOUS at 04:12

## 2018-04-24 RX ADMIN — FENTANYL CITRATE 100 MCG: 50 INJECTION, SOLUTION INTRAMUSCULAR; INTRAVENOUS at 11:57

## 2018-04-24 RX ADMIN — FENTANYL CITRATE 100 MCG: 50 INJECTION, SOLUTION INTRAMUSCULAR; INTRAVENOUS at 18:43

## 2018-04-24 RX ADMIN — FENTANYL CITRATE 100 MCG: 50 INJECTION, SOLUTION INTRAMUSCULAR; INTRAVENOUS at 20:13

## 2018-04-24 RX ADMIN — FENTANYL CITRATE 100 MCG: 50 INJECTION, SOLUTION INTRAMUSCULAR; INTRAVENOUS at 09:56

## 2018-04-24 RX ADMIN — FAMOTIDINE 20 MG: 20 TABLET, FILM COATED ORAL at 07:44

## 2018-04-24 RX ADMIN — HEPARIN SODIUM 5000 UNITS: 5000 INJECTION, SOLUTION INTRAVENOUS; SUBCUTANEOUS at 22:14

## 2018-04-24 RX ADMIN — OXYCODONE HYDROCHLORIDE 10 MG: 5 SOLUTION ORAL at 08:27

## 2018-04-24 RX ADMIN — HEPARIN SODIUM 5000 UNITS: 5000 INJECTION, SOLUTION INTRAVENOUS; SUBCUTANEOUS at 15:14

## 2018-04-24 RX ADMIN — SODIUM CHLORIDE, POTASSIUM CHLORIDE, SODIUM LACTATE AND CALCIUM CHLORIDE: 600; 310; 30; 20 INJECTION, SOLUTION INTRAVENOUS at 22:11

## 2018-04-24 RX ADMIN — OXYCODONE HYDROCHLORIDE 10 MG: 5 SOLUTION ORAL at 12:27

## 2018-04-24 RX ADMIN — FENTANYL CITRATE 100 MCG: 50 INJECTION, SOLUTION INTRAMUSCULAR; INTRAVENOUS at 07:45

## 2018-04-24 RX ADMIN — OXYCODONE HYDROCHLORIDE 10 MG: 5 SOLUTION ORAL at 16:38

## 2018-04-24 RX ADMIN — POTASSIUM CHLORIDE 20 MEQ: 1.5 POWDER, FOR SOLUTION ORAL at 20:17

## 2018-04-24 RX ADMIN — AMPICILLIN SODIUM AND SULBACTAM SODIUM 1.5 G: 1; .5 INJECTION, POWDER, FOR SOLUTION INTRAMUSCULAR; INTRAVENOUS at 09:56

## 2018-04-24 RX ADMIN — DOCUSATE SODIUM 100 MG: 50 LIQUID ORAL at 20:17

## 2018-04-24 RX ADMIN — FENTANYL CITRATE 100 MCG: 50 INJECTION, SOLUTION INTRAMUSCULAR; INTRAVENOUS at 16:39

## 2018-04-24 RX ADMIN — PROPOFOL 50 MCG/KG/MIN: 10 INJECTION, EMULSION INTRAVENOUS at 08:27

## 2018-04-24 RX ADMIN — POTASSIUM CHLORIDE 20 MEQ: 1.5 POWDER, FOR SOLUTION ORAL at 07:44

## 2018-04-24 RX ADMIN — OXYCODONE HYDROCHLORIDE 10 MG: 5 SOLUTION ORAL at 23:52

## 2018-04-24 RX ADMIN — POTASSIUM CHLORIDE, DEXTROSE MONOHYDRATE AND SODIUM CHLORIDE: 150; 5; 450 INJECTION, SOLUTION INTRAVENOUS at 02:50

## 2018-04-24 RX ADMIN — FENTANYL CITRATE 100 MCG: 50 INJECTION, SOLUTION INTRAMUSCULAR; INTRAVENOUS at 15:14

## 2018-04-24 RX ADMIN — FENTANYL CITRATE 100 MCG: 50 INJECTION, SOLUTION INTRAMUSCULAR; INTRAVENOUS at 22:50

## 2018-04-24 ASSESSMENT — PAIN SCALES - GENERAL
PAINLEVEL_OUTOF10: 9
PAINLEVEL_OUTOF10: 10
PAINLEVEL_OUTOF10: 6
PAINLEVEL_OUTOF10: 8
PAINLEVEL_OUTOF10: 9
PAINLEVEL_OUTOF10: 8
PAINLEVEL_OUTOF10: 8
PAINLEVEL_OUTOF10: 9
PAINLEVEL_OUTOF10: 8
PAINLEVEL_OUTOF10: 9
PAINLEVEL_OUTOF10: 5
PAINLEVEL_OUTOF10: 9
PAINLEVEL_OUTOF10: 8
PAINLEVEL_OUTOF10: 9

## 2018-04-24 ASSESSMENT — PULMONARY FUNCTION TESTS
PIF_VALUE: 25
PIF_VALUE: 7
PIF_VALUE: 11
PIF_VALUE: 11
PIF_VALUE: 18
PIF_VALUE: 11
PIF_VALUE: 27
PIF_VALUE: 11

## 2018-04-24 ASSESSMENT — PAIN DESCRIPTION - LOCATION
LOCATION: FACE;JAW

## 2018-04-24 ASSESSMENT — PAIN DESCRIPTION - DESCRIPTORS
DESCRIPTORS: CONSTANT;DISCOMFORT;SORE;TENDER

## 2018-04-24 ASSESSMENT — PAIN DESCRIPTION - PAIN TYPE
TYPE: ACUTE PAIN

## 2018-04-24 ASSESSMENT — PAIN DESCRIPTION - FREQUENCY
FREQUENCY: CONTINUOUS

## 2018-04-24 ASSESSMENT — PAIN DESCRIPTION - PROGRESSION
CLINICAL_PROGRESSION: GRADUALLY IMPROVING

## 2018-04-24 ASSESSMENT — PAIN DESCRIPTION - ONSET
ONSET: ON-GOING

## 2018-04-24 ASSESSMENT — PAIN DESCRIPTION - ORIENTATION
ORIENTATION: LEFT;ANTERIOR
ORIENTATION: ANTERIOR

## 2018-04-25 ENCOUNTER — ANESTHESIA (OUTPATIENT)
Dept: OPERATING ROOM | Age: 33
DRG: 131 | End: 2018-04-25
Payer: COMMERCIAL

## 2018-04-25 VITALS
DIASTOLIC BLOOD PRESSURE: 78 MMHG | OXYGEN SATURATION: 100 % | RESPIRATION RATE: 12 BRPM | TEMPERATURE: 96.8 F | SYSTOLIC BLOOD PRESSURE: 121 MMHG

## 2018-04-25 LAB
ABSOLUTE EOS #: 0.06 K/UL (ref 0–0.44)
ABSOLUTE IMMATURE GRANULOCYTE: <0.03 K/UL (ref 0–0.3)
ABSOLUTE LYMPH #: 1.22 K/UL (ref 1.1–3.7)
ABSOLUTE MONO #: 0.5 K/UL (ref 0.1–1.2)
ANION GAP SERPL CALCULATED.3IONS-SCNC: 15 MMOL/L (ref 9–17)
BASOPHILS # BLD: 0 % (ref 0–2)
BASOPHILS ABSOLUTE: <0.03 K/UL (ref 0–0.2)
BUN BLDV-MCNC: 2 MG/DL (ref 6–20)
BUN/CREAT BLD: ABNORMAL (ref 9–20)
CALCIUM SERPL-MCNC: 9 MG/DL (ref 8.6–10.4)
CHLORIDE BLD-SCNC: 101 MMOL/L (ref 98–107)
CO2: 25 MMOL/L (ref 20–31)
CREAT SERPL-MCNC: 0.62 MG/DL (ref 0.7–1.2)
DIFFERENTIAL TYPE: ABNORMAL
EOSINOPHILS RELATIVE PERCENT: 2 % (ref 1–4)
GFR AFRICAN AMERICAN: >60 ML/MIN
GFR NON-AFRICAN AMERICAN: >60 ML/MIN
GFR SERPL CREATININE-BSD FRML MDRD: ABNORMAL ML/MIN/{1.73_M2}
GFR SERPL CREATININE-BSD FRML MDRD: ABNORMAL ML/MIN/{1.73_M2}
GLUCOSE BLD-MCNC: 103 MG/DL (ref 70–99)
HCT VFR BLD CALC: 32.7 % (ref 40.7–50.3)
HCT VFR BLD CALC: 36.4 % (ref 40.7–50.3)
HEMOGLOBIN: 10.8 G/DL (ref 13–17)
HEMOGLOBIN: 12.6 G/DL (ref 13–17)
IMMATURE GRANULOCYTES: 0 %
LYMPHOCYTES # BLD: 31 % (ref 24–43)
MCH RBC QN AUTO: 34.6 PG (ref 25.2–33.5)
MCHC RBC AUTO-ENTMCNC: 33 G/DL (ref 28.4–34.8)
MCV RBC AUTO: 104.8 FL (ref 82.6–102.9)
MONOCYTES # BLD: 13 % (ref 3–12)
NRBC AUTOMATED: 0 PER 100 WBC
PDW BLD-RTO: 12.5 % (ref 11.8–14.4)
PLATELET # BLD: 133 K/UL (ref 138–453)
PLATELET ESTIMATE: ABNORMAL
PMV BLD AUTO: 10.2 FL (ref 8.1–13.5)
POTASSIUM SERPL-SCNC: 3.7 MMOL/L (ref 3.7–5.3)
RBC # BLD: 3.12 M/UL (ref 4.21–5.77)
RBC # BLD: ABNORMAL 10*6/UL
SEG NEUTROPHILS: 54 % (ref 36–65)
SEGMENTED NEUTROPHILS ABSOLUTE COUNT: 2.15 K/UL (ref 1.5–8.1)
SODIUM BLD-SCNC: 141 MMOL/L (ref 135–144)
WBC # BLD: 4 K/UL (ref 3.5–11.3)
WBC # BLD: ABNORMAL 10*3/UL

## 2018-04-25 PROCEDURE — 94762 N-INVAS EAR/PLS OXIMTRY CONT: CPT

## 2018-04-25 PROCEDURE — 85014 HEMATOCRIT: CPT

## 2018-04-25 PROCEDURE — 85018 HEMOGLOBIN: CPT

## 2018-04-25 PROCEDURE — 6360000002 HC RX W HCPCS: Performed by: STUDENT IN AN ORGANIZED HEALTH CARE EDUCATION/TRAINING PROGRAM

## 2018-04-25 PROCEDURE — 6360000002 HC RX W HCPCS: Performed by: NURSE ANESTHETIST, CERTIFIED REGISTERED

## 2018-04-25 PROCEDURE — 94770 HC ETCO2 MONITOR DAILY: CPT

## 2018-04-25 PROCEDURE — 0NSV35Z REPOSITION LEFT MANDIBLE WITH EXTERNAL FIXATION DEVICE, PERCUTANEOUS APPROACH: ICD-10-PCS | Performed by: DENTIST

## 2018-04-25 PROCEDURE — C1713 ANCHOR/SCREW BN/BN,TIS/BN: HCPCS | Performed by: DENTIST

## 2018-04-25 PROCEDURE — 2500000003 HC RX 250 WO HCPCS: Performed by: NURSE ANESTHETIST, CERTIFIED REGISTERED

## 2018-04-25 PROCEDURE — 2720000010 HC SURG SUPPLY STERILE: Performed by: DENTIST

## 2018-04-25 PROCEDURE — 3600000004 HC SURGERY LEVEL 4 BASE: Performed by: DENTIST

## 2018-04-25 PROCEDURE — 6370000000 HC RX 637 (ALT 250 FOR IP): Performed by: STUDENT IN AN ORGANIZED HEALTH CARE EDUCATION/TRAINING PROGRAM

## 2018-04-25 PROCEDURE — 3600000014 HC SURGERY LEVEL 4 ADDTL 15MIN: Performed by: DENTIST

## 2018-04-25 PROCEDURE — 3700000001 HC ADD 15 MINUTES (ANESTHESIA): Performed by: DENTIST

## 2018-04-25 PROCEDURE — 0NSV04Z REPOSITION LEFT MANDIBLE WITH INTERNAL FIXATION DEVICE, OPEN APPROACH: ICD-10-PCS | Performed by: DENTIST

## 2018-04-25 PROCEDURE — 2580000003 HC RX 258: Performed by: STUDENT IN AN ORGANIZED HEALTH CARE EDUCATION/TRAINING PROGRAM

## 2018-04-25 PROCEDURE — 2500000003 HC RX 250 WO HCPCS: Performed by: ANESTHESIOLOGY

## 2018-04-25 PROCEDURE — 6360000002 HC RX W HCPCS: Performed by: EMERGENCY MEDICINE

## 2018-04-25 PROCEDURE — 2000000000 HC ICU R&B

## 2018-04-25 PROCEDURE — 94003 VENT MGMT INPAT SUBQ DAY: CPT

## 2018-04-25 PROCEDURE — 2580000003 HC RX 258: Performed by: DENTIST

## 2018-04-25 PROCEDURE — 85025 COMPLETE CBC W/AUTO DIFF WBC: CPT

## 2018-04-25 PROCEDURE — 3700000000 HC ANESTHESIA ATTENDED CARE: Performed by: DENTIST

## 2018-04-25 PROCEDURE — 2580000003 HC RX 258: Performed by: EMERGENCY MEDICINE

## 2018-04-25 PROCEDURE — 36415 COLL VENOUS BLD VENIPUNCTURE: CPT

## 2018-04-25 PROCEDURE — 6360000002 HC RX W HCPCS: Performed by: NURSE PRACTITIONER

## 2018-04-25 PROCEDURE — 80048 BASIC METABOLIC PNL TOTAL CA: CPT

## 2018-04-25 DEVICE — IMPLANTABLE DEVICE: Type: IMPLANTABLE DEVICE | Status: FUNCTIONAL

## 2018-04-25 RX ORDER — CEFAZOLIN SODIUM 2 G/100ML
INJECTION, SOLUTION INTRAVENOUS PRN
Status: DISCONTINUED | OUTPATIENT
Start: 2018-04-25 | End: 2018-04-25 | Stop reason: SDUPTHER

## 2018-04-25 RX ORDER — FENTANYL CITRATE 50 UG/ML
50 INJECTION, SOLUTION INTRAMUSCULAR; INTRAVENOUS
Status: DISCONTINUED | OUTPATIENT
Start: 2018-04-25 | End: 2018-04-25

## 2018-04-25 RX ORDER — MAGNESIUM HYDROXIDE 1200 MG/15ML
LIQUID ORAL CONTINUOUS PRN
Status: COMPLETED | OUTPATIENT
Start: 2018-04-25 | End: 2018-04-25

## 2018-04-25 RX ORDER — FENTANYL CITRATE 50 UG/ML
100 INJECTION, SOLUTION INTRAMUSCULAR; INTRAVENOUS
Status: DISCONTINUED | OUTPATIENT
Start: 2018-04-25 | End: 2018-04-27

## 2018-04-25 RX ORDER — GLYCOPYRROLATE 1 MG/5 ML
SYRINGE (ML) INTRAVENOUS PRN
Status: DISCONTINUED | OUTPATIENT
Start: 2018-04-25 | End: 2018-04-25 | Stop reason: SDUPTHER

## 2018-04-25 RX ORDER — ROCURONIUM BROMIDE 10 MG/ML
INJECTION, SOLUTION INTRAVENOUS PRN
Status: DISCONTINUED | OUTPATIENT
Start: 2018-04-25 | End: 2018-04-25 | Stop reason: SDUPTHER

## 2018-04-25 RX ORDER — MIDAZOLAM HYDROCHLORIDE 1 MG/ML
INJECTION INTRAMUSCULAR; INTRAVENOUS PRN
Status: DISCONTINUED | OUTPATIENT
Start: 2018-04-25 | End: 2018-04-25 | Stop reason: SDUPTHER

## 2018-04-25 RX ORDER — FENTANYL CITRATE 50 UG/ML
50 INJECTION, SOLUTION INTRAMUSCULAR; INTRAVENOUS
Status: DISCONTINUED | OUTPATIENT
Start: 2018-04-25 | End: 2018-04-28

## 2018-04-25 RX ORDER — METOPROLOL TARTRATE 5 MG/5ML
INJECTION INTRAVENOUS PRN
Status: DISCONTINUED | OUTPATIENT
Start: 2018-04-25 | End: 2018-04-25 | Stop reason: SDUPTHER

## 2018-04-25 RX ORDER — FENTANYL CITRATE 50 UG/ML
INJECTION, SOLUTION INTRAMUSCULAR; INTRAVENOUS PRN
Status: DISCONTINUED | OUTPATIENT
Start: 2018-04-25 | End: 2018-04-25 | Stop reason: SDUPTHER

## 2018-04-25 RX ORDER — PROPOFOL 10 MG/ML
INJECTION, EMULSION INTRAVENOUS CONTINUOUS PRN
Status: DISCONTINUED | OUTPATIENT
Start: 2018-04-25 | End: 2018-04-25 | Stop reason: SDUPTHER

## 2018-04-25 RX ORDER — DEXAMETHASONE SODIUM PHOSPHATE 10 MG/ML
INJECTION INTRAMUSCULAR; INTRAVENOUS PRN
Status: DISCONTINUED | OUTPATIENT
Start: 2018-04-25 | End: 2018-04-25 | Stop reason: SDUPTHER

## 2018-04-25 RX ADMIN — OXYCODONE HYDROCHLORIDE 10 MG: 5 SOLUTION ORAL at 19:53

## 2018-04-25 RX ADMIN — OXYCODONE HYDROCHLORIDE 10 MG: 5 SOLUTION ORAL at 08:00

## 2018-04-25 RX ADMIN — FENTANYL CITRATE 50 MCG: 50 INJECTION, SOLUTION INTRAMUSCULAR; INTRAVENOUS at 18:47

## 2018-04-25 RX ADMIN — PROPOFOL 80 MCG/KG/MIN: 10 INJECTION, EMULSION INTRAVENOUS at 19:31

## 2018-04-25 RX ADMIN — SODIUM CHLORIDE, POTASSIUM CHLORIDE, SODIUM LACTATE AND CALCIUM CHLORIDE: 600; 310; 30; 20 INJECTION, SOLUTION INTRAVENOUS at 19:47

## 2018-04-25 RX ADMIN — FENTANYL CITRATE 100 MCG: 50 INJECTION INTRAMUSCULAR; INTRAVENOUS at 17:32

## 2018-04-25 RX ADMIN — METOPROLOL TARTRATE 1 MG: 1 INJECTION, SOLUTION INTRAVENOUS at 16:33

## 2018-04-25 RX ADMIN — PROPOFOL 30 MCG/KG/MIN: 10 INJECTION, EMULSION INTRAVENOUS at 06:55

## 2018-04-25 RX ADMIN — ROCURONIUM BROMIDE 50 MG: 10 INJECTION INTRAVENOUS at 15:45

## 2018-04-25 RX ADMIN — FENTANYL CITRATE 50 MCG: 50 INJECTION, SOLUTION INTRAMUSCULAR; INTRAVENOUS at 13:26

## 2018-04-25 RX ADMIN — CEFAZOLIN SODIUM 2 G: 2 INJECTION, SOLUTION INTRAVENOUS at 16:22

## 2018-04-25 RX ADMIN — FENTANYL CITRATE 100 MCG: 50 INJECTION INTRAMUSCULAR; INTRAVENOUS at 15:45

## 2018-04-25 RX ADMIN — METOPROLOL TARTRATE 1 MG: 1 INJECTION, SOLUTION INTRAVENOUS at 16:38

## 2018-04-25 RX ADMIN — PHENYLEPHRINE HYDROCHLORIDE 2 SPRAY: 0.5 SPRAY NASAL at 15:42

## 2018-04-25 RX ADMIN — FENTANYL CITRATE 100 MCG: 50 INJECTION, SOLUTION INTRAMUSCULAR; INTRAVENOUS at 01:03

## 2018-04-25 RX ADMIN — FENTANYL CITRATE 50 MCG: 50 INJECTION INTRAMUSCULAR; INTRAVENOUS at 16:08

## 2018-04-25 RX ADMIN — HEPARIN SODIUM 5000 UNITS: 5000 INJECTION, SOLUTION INTRAVENOUS; SUBCUTANEOUS at 22:18

## 2018-04-25 RX ADMIN — FENTANYL CITRATE 100 MCG: 50 INJECTION, SOLUTION INTRAMUSCULAR; INTRAVENOUS at 23:10

## 2018-04-25 RX ADMIN — POTASSIUM CHLORIDE 20 MEQ: 1.5 POWDER, FOR SOLUTION ORAL at 19:53

## 2018-04-25 RX ADMIN — DEXAMETHASONE SODIUM PHOSPHATE 10 MG: 10 INJECTION INTRAMUSCULAR; INTRAVENOUS at 16:38

## 2018-04-25 RX ADMIN — PROPOFOL 40 MCG/KG/MIN: 10 INJECTION, EMULSION INTRAVENOUS at 13:30

## 2018-04-25 RX ADMIN — Medication 10 ML: at 22:18

## 2018-04-25 RX ADMIN — AMPICILLIN SODIUM AND SULBACTAM SODIUM 1.5 G: 1; .5 INJECTION, POWDER, FOR SOLUTION INTRAMUSCULAR; INTRAVENOUS at 22:18

## 2018-04-25 RX ADMIN — DOCUSATE SODIUM 100 MG: 50 LIQUID ORAL at 08:02

## 2018-04-25 RX ADMIN — FENTANYL CITRATE 100 MCG: 50 INJECTION, SOLUTION INTRAMUSCULAR; INTRAVENOUS at 05:02

## 2018-04-25 RX ADMIN — POTASSIUM CHLORIDE 20 MEQ: 1.5 POWDER, FOR SOLUTION ORAL at 08:02

## 2018-04-25 RX ADMIN — DOCUSATE SODIUM 100 MG: 50 LIQUID ORAL at 19:53

## 2018-04-25 RX ADMIN — FENTANYL CITRATE 50 MCG: 50 INJECTION INTRAMUSCULAR; INTRAVENOUS at 16:56

## 2018-04-25 RX ADMIN — PROPOFOL 80 MCG/KG/MIN: 10 INJECTION, EMULSION INTRAVENOUS at 22:24

## 2018-04-25 RX ADMIN — FENTANYL CITRATE 100 MCG: 50 INJECTION INTRAMUSCULAR; INTRAVENOUS at 16:29

## 2018-04-25 RX ADMIN — MIDAZOLAM HYDROCHLORIDE 2 MG: 1 INJECTION, SOLUTION INTRAMUSCULAR; INTRAVENOUS at 15:45

## 2018-04-25 RX ADMIN — AMPICILLIN SODIUM AND SULBACTAM SODIUM 1.5 G: 1; .5 INJECTION, POWDER, FOR SOLUTION INTRAMUSCULAR; INTRAVENOUS at 11:49

## 2018-04-25 RX ADMIN — Medication 0.4 MG: at 15:50

## 2018-04-25 RX ADMIN — SODIUM CHLORIDE, POTASSIUM CHLORIDE, SODIUM LACTATE AND CALCIUM CHLORIDE: 600; 310; 30; 20 INJECTION, SOLUTION INTRAVENOUS at 08:12

## 2018-04-25 RX ADMIN — AMPICILLIN SODIUM AND SULBACTAM SODIUM 1.5 G: 1; .5 INJECTION, POWDER, FOR SOLUTION INTRAMUSCULAR; INTRAVENOUS at 04:48

## 2018-04-25 RX ADMIN — ROCURONIUM BROMIDE 50 MG: 10 INJECTION INTRAVENOUS at 16:59

## 2018-04-25 RX ADMIN — PROPOFOL 100 MCG/KG/MIN: 10 INJECTION, EMULSION INTRAVENOUS at 17:39

## 2018-04-25 RX ADMIN — FENTANYL CITRATE 100 MCG: 50 INJECTION INTRAMUSCULAR; INTRAVENOUS at 15:56

## 2018-04-25 RX ADMIN — FENTANYL CITRATE 50 MCG: 50 INJECTION, SOLUTION INTRAMUSCULAR; INTRAVENOUS at 10:19

## 2018-04-25 RX ADMIN — SODIUM CHLORIDE, POTASSIUM CHLORIDE, SODIUM LACTATE AND CALCIUM CHLORIDE: 600; 310; 30; 20 INJECTION, SOLUTION INTRAVENOUS at 16:16

## 2018-04-25 ASSESSMENT — PULMONARY FUNCTION TESTS
PIF_VALUE: 16
PIF_VALUE: 11
PIF_VALUE: 18
PIF_VALUE: 18
PIF_VALUE: 16
PIF_VALUE: 16
PIF_VALUE: 17
PIF_VALUE: 28
PIF_VALUE: 17
PIF_VALUE: 17
PIF_VALUE: 18
PIF_VALUE: 17
PIF_VALUE: 18
PIF_VALUE: 3
PIF_VALUE: 18
PIF_VALUE: 17
PIF_VALUE: 18
PIF_VALUE: 18
PIF_VALUE: 17
PIF_VALUE: 16
PIF_VALUE: 16
PIF_VALUE: 18
PIF_VALUE: 16
PIF_VALUE: 18
PIF_VALUE: 17
PIF_VALUE: 18
PIF_VALUE: 17
PIF_VALUE: 16
PIF_VALUE: 16
PIF_VALUE: 17
PIF_VALUE: 18
PIF_VALUE: 16
PIF_VALUE: 18
PIF_VALUE: 16
PIF_VALUE: 16
PIF_VALUE: 17
PIF_VALUE: 7
PIF_VALUE: 24
PIF_VALUE: 17
PIF_VALUE: 18
PIF_VALUE: 16
PIF_VALUE: 18
PIF_VALUE: 18
PIF_VALUE: 6
PIF_VALUE: 18
PIF_VALUE: 18
PIF_VALUE: 17
PIF_VALUE: 16
PIF_VALUE: 17
PIF_VALUE: 17
PIF_VALUE: 35
PIF_VALUE: 6
PIF_VALUE: 38
PIF_VALUE: 17
PIF_VALUE: 16
PIF_VALUE: 16
PIF_VALUE: 18
PIF_VALUE: 18
PIF_VALUE: 17
PIF_VALUE: 17
PIF_VALUE: 18
PIF_VALUE: 18
PIF_VALUE: 17
PIF_VALUE: 18
PIF_VALUE: 17
PIF_VALUE: 17
PIF_VALUE: 18
PIF_VALUE: 17
PIF_VALUE: 21
PIF_VALUE: 17
PIF_VALUE: 18
PIF_VALUE: 17
PIF_VALUE: 18
PIF_VALUE: 17
PIF_VALUE: 18
PIF_VALUE: 16
PIF_VALUE: 16
PIF_VALUE: 18
PIF_VALUE: 16
PIF_VALUE: 16
PIF_VALUE: 17
PIF_VALUE: 16
PIF_VALUE: 18
PIF_VALUE: 17
PIF_VALUE: 19
PIF_VALUE: 17
PIF_VALUE: 18
PIF_VALUE: 16
PIF_VALUE: 16
PIF_VALUE: 18
PIF_VALUE: 17
PIF_VALUE: 16
PIF_VALUE: 13
PIF_VALUE: 0
PIF_VALUE: 16
PIF_VALUE: 18
PIF_VALUE: 17
PIF_VALUE: 17
PIF_VALUE: 18
PIF_VALUE: 18
PIF_VALUE: 17
PIF_VALUE: 18
PIF_VALUE: 0
PIF_VALUE: 5
PIF_VALUE: 17
PIF_VALUE: 17
PIF_VALUE: 18
PIF_VALUE: 5
PIF_VALUE: 16
PIF_VALUE: 17
PIF_VALUE: 6
PIF_VALUE: 18
PIF_VALUE: 16
PIF_VALUE: 18
PIF_VALUE: 16
PIF_VALUE: 17
PIF_VALUE: 1
PIF_VALUE: 18
PIF_VALUE: 19
PIF_VALUE: 17
PIF_VALUE: 16
PIF_VALUE: 17

## 2018-04-25 ASSESSMENT — PAIN DESCRIPTION - PAIN TYPE
TYPE: ACUTE PAIN

## 2018-04-25 ASSESSMENT — PAIN DESCRIPTION - LOCATION
LOCATION: FACE;JAW
LOCATION: FACE;JAW
LOCATION: THROAT;FACE
LOCATION: FACE;JAW
LOCATION: THROAT;FACE
LOCATION: FACE;JAW
LOCATION: FACE;JAW

## 2018-04-25 ASSESSMENT — PAIN SCALES - GENERAL
PAINLEVEL_OUTOF10: 8
PAINLEVEL_OUTOF10: 9
PAINLEVEL_OUTOF10: 5
PAINLEVEL_OUTOF10: 4
PAINLEVEL_OUTOF10: 9
PAINLEVEL_OUTOF10: 0
PAINLEVEL_OUTOF10: 9

## 2018-04-25 ASSESSMENT — PAIN DESCRIPTION - ONSET
ONSET: ON-GOING

## 2018-04-25 ASSESSMENT — PAIN DESCRIPTION - ORIENTATION
ORIENTATION: LEFT;ANTERIOR

## 2018-04-25 ASSESSMENT — PAIN DESCRIPTION - PROGRESSION
CLINICAL_PROGRESSION: GRADUALLY IMPROVING
CLINICAL_PROGRESSION: RAPIDLY WORSENING
CLINICAL_PROGRESSION: GRADUALLY IMPROVING

## 2018-04-26 ENCOUNTER — APPOINTMENT (OUTPATIENT)
Dept: CT IMAGING | Age: 33
DRG: 131 | End: 2018-04-26
Payer: COMMERCIAL

## 2018-04-26 LAB
ABSOLUTE EOS #: <0.03 K/UL (ref 0–0.44)
ABSOLUTE IMMATURE GRANULOCYTE: 0.03 K/UL (ref 0–0.3)
ABSOLUTE LYMPH #: 1.14 K/UL (ref 1.1–3.7)
ABSOLUTE MONO #: 0.83 K/UL (ref 0.1–1.2)
ANION GAP SERPL CALCULATED.3IONS-SCNC: 20 MMOL/L (ref 9–17)
BASOPHILS # BLD: 0 % (ref 0–2)
BASOPHILS ABSOLUTE: <0.03 K/UL (ref 0–0.2)
BUN BLDV-MCNC: 3 MG/DL (ref 6–20)
BUN/CREAT BLD: ABNORMAL (ref 9–20)
CALCIUM SERPL-MCNC: 9 MG/DL (ref 8.6–10.4)
CHLORIDE BLD-SCNC: 100 MMOL/L (ref 98–107)
CO2: 20 MMOL/L (ref 20–31)
CREAT SERPL-MCNC: 0.53 MG/DL (ref 0.7–1.2)
DIFFERENTIAL TYPE: ABNORMAL
EOSINOPHILS RELATIVE PERCENT: 0 % (ref 1–4)
GFR AFRICAN AMERICAN: >60 ML/MIN
GFR NON-AFRICAN AMERICAN: >60 ML/MIN
GFR SERPL CREATININE-BSD FRML MDRD: ABNORMAL ML/MIN/{1.73_M2}
GFR SERPL CREATININE-BSD FRML MDRD: ABNORMAL ML/MIN/{1.73_M2}
GLUCOSE BLD-MCNC: 109 MG/DL (ref 70–99)
HCT VFR BLD CALC: 35.9 % (ref 40.7–50.3)
HEMOGLOBIN: 11.7 G/DL (ref 13–17)
IMMATURE GRANULOCYTES: 1 %
LYMPHOCYTES # BLD: 20 % (ref 24–43)
MCH RBC QN AUTO: 34.5 PG (ref 25.2–33.5)
MCHC RBC AUTO-ENTMCNC: 32.6 G/DL (ref 28.4–34.8)
MCV RBC AUTO: 105.9 FL (ref 82.6–102.9)
MONOCYTES # BLD: 15 % (ref 3–12)
NRBC AUTOMATED: 0 PER 100 WBC
PDW BLD-RTO: 12.2 % (ref 11.8–14.4)
PLATELET # BLD: 183 K/UL (ref 138–453)
PLATELET ESTIMATE: ABNORMAL
PMV BLD AUTO: 9.4 FL (ref 8.1–13.5)
POTASSIUM SERPL-SCNC: 3.6 MMOL/L (ref 3.7–5.3)
RBC # BLD: 3.39 M/UL (ref 4.21–5.77)
RBC # BLD: ABNORMAL 10*6/UL
SEG NEUTROPHILS: 64 % (ref 36–65)
SEGMENTED NEUTROPHILS ABSOLUTE COUNT: 3.67 K/UL (ref 1.5–8.1)
SODIUM BLD-SCNC: 140 MMOL/L (ref 135–144)
WBC # BLD: 5.7 K/UL (ref 3.5–11.3)
WBC # BLD: ABNORMAL 10*3/UL

## 2018-04-26 PROCEDURE — 2580000003 HC RX 258: Performed by: STUDENT IN AN ORGANIZED HEALTH CARE EDUCATION/TRAINING PROGRAM

## 2018-04-26 PROCEDURE — 36415 COLL VENOUS BLD VENIPUNCTURE: CPT

## 2018-04-26 PROCEDURE — 6360000002 HC RX W HCPCS: Performed by: STUDENT IN AN ORGANIZED HEALTH CARE EDUCATION/TRAINING PROGRAM

## 2018-04-26 PROCEDURE — 94770 HC ETCO2 MONITOR DAILY: CPT

## 2018-04-26 PROCEDURE — 6360000002 HC RX W HCPCS: Performed by: NURSE PRACTITIONER

## 2018-04-26 PROCEDURE — 6370000000 HC RX 637 (ALT 250 FOR IP): Performed by: STUDENT IN AN ORGANIZED HEALTH CARE EDUCATION/TRAINING PROGRAM

## 2018-04-26 PROCEDURE — 6370000000 HC RX 637 (ALT 250 FOR IP): Performed by: EMERGENCY MEDICINE

## 2018-04-26 PROCEDURE — 70490 CT SOFT TISSUE NECK W/O DYE: CPT

## 2018-04-26 PROCEDURE — 2000000000 HC ICU R&B

## 2018-04-26 PROCEDURE — 80048 BASIC METABOLIC PNL TOTAL CA: CPT

## 2018-04-26 PROCEDURE — 94003 VENT MGMT INPAT SUBQ DAY: CPT

## 2018-04-26 PROCEDURE — 2580000003 HC RX 258: Performed by: EMERGENCY MEDICINE

## 2018-04-26 PROCEDURE — 85025 COMPLETE CBC W/AUTO DIFF WBC: CPT

## 2018-04-26 PROCEDURE — 6360000002 HC RX W HCPCS: Performed by: EMERGENCY MEDICINE

## 2018-04-26 RX ORDER — DEXAMETHASONE SODIUM PHOSPHATE 10 MG/ML
10 INJECTION INTRAMUSCULAR; INTRAVENOUS EVERY 6 HOURS
Status: COMPLETED | OUTPATIENT
Start: 2018-04-26 | End: 2018-04-27

## 2018-04-26 RX ADMIN — FENTANYL CITRATE 100 MCG: 50 INJECTION, SOLUTION INTRAMUSCULAR; INTRAVENOUS at 13:20

## 2018-04-26 RX ADMIN — FENTANYL CITRATE 100 MCG: 50 INJECTION, SOLUTION INTRAMUSCULAR; INTRAVENOUS at 15:14

## 2018-04-26 RX ADMIN — AMPICILLIN SODIUM AND SULBACTAM SODIUM 1.5 G: 1; .5 INJECTION, POWDER, FOR SOLUTION INTRAMUSCULAR; INTRAVENOUS at 05:07

## 2018-04-26 RX ADMIN — OXYCODONE HYDROCHLORIDE 10 MG: 5 SOLUTION ORAL at 17:50

## 2018-04-26 RX ADMIN — SODIUM CHLORIDE, POTASSIUM CHLORIDE, SODIUM LACTATE AND CALCIUM CHLORIDE: 600; 310; 30; 20 INJECTION, SOLUTION INTRAVENOUS at 17:41

## 2018-04-26 RX ADMIN — FENTANYL CITRATE 100 MCG: 50 INJECTION, SOLUTION INTRAMUSCULAR; INTRAVENOUS at 05:08

## 2018-04-26 RX ADMIN — OXYCODONE HYDROCHLORIDE 10 MG: 5 SOLUTION ORAL at 22:23

## 2018-04-26 RX ADMIN — HEPARIN SODIUM 5000 UNITS: 5000 INJECTION, SOLUTION INTRAVENOUS; SUBCUTANEOUS at 06:19

## 2018-04-26 RX ADMIN — FENTANYL CITRATE 100 MCG: 50 INJECTION, SOLUTION INTRAMUSCULAR; INTRAVENOUS at 21:00

## 2018-04-26 RX ADMIN — DEXAMETHASONE SODIUM PHOSPHATE 10 MG: 10 INJECTION, SOLUTION INTRAMUSCULAR; INTRAVENOUS at 21:02

## 2018-04-26 RX ADMIN — OXYCODONE HYDROCHLORIDE 10 MG: 5 SOLUTION ORAL at 04:10

## 2018-04-26 RX ADMIN — POTASSIUM CHLORIDE 20 MEQ: 1.5 POWDER, FOR SOLUTION ORAL at 08:15

## 2018-04-26 RX ADMIN — OXYCODONE HYDROCHLORIDE 10 MG: 5 SOLUTION ORAL at 13:50

## 2018-04-26 RX ADMIN — AMPICILLIN SODIUM AND SULBACTAM SODIUM 1.5 G: 1; .5 INJECTION, POWDER, FOR SOLUTION INTRAMUSCULAR; INTRAVENOUS at 12:14

## 2018-04-26 RX ADMIN — PROPOFOL 25 MCG/KG/MIN: 10 INJECTION, EMULSION INTRAVENOUS at 15:17

## 2018-04-26 RX ADMIN — Medication 10 ML: at 08:17

## 2018-04-26 RX ADMIN — SODIUM CHLORIDE, POTASSIUM CHLORIDE, SODIUM LACTATE AND CALCIUM CHLORIDE: 600; 310; 30; 20 INJECTION, SOLUTION INTRAVENOUS at 05:07

## 2018-04-26 RX ADMIN — DOCUSATE SODIUM 100 MG: 50 LIQUID ORAL at 08:15

## 2018-04-26 RX ADMIN — AMPICILLIN SODIUM AND SULBACTAM SODIUM 1.5 G: 1; .5 INJECTION, POWDER, FOR SOLUTION INTRAMUSCULAR; INTRAVENOUS at 17:29

## 2018-04-26 RX ADMIN — POTASSIUM CHLORIDE 20 MEQ: 1.5 POWDER, FOR SOLUTION ORAL at 22:22

## 2018-04-26 RX ADMIN — AMPICILLIN SODIUM AND SULBACTAM SODIUM 1.5 G: 1; .5 INJECTION, POWDER, FOR SOLUTION INTRAMUSCULAR; INTRAVENOUS at 22:23

## 2018-04-26 RX ADMIN — HEPARIN SODIUM 5000 UNITS: 5000 INJECTION, SOLUTION INTRAVENOUS; SUBCUTANEOUS at 22:22

## 2018-04-26 RX ADMIN — PROPOFOL 50 MCG/KG/MIN: 10 INJECTION, EMULSION INTRAVENOUS at 03:27

## 2018-04-26 RX ADMIN — ACETAMINOPHEN 650 MG: 325 TABLET ORAL at 08:11

## 2018-04-26 RX ADMIN — OXYCODONE HYDROCHLORIDE 10 MG: 5 SOLUTION ORAL at 08:10

## 2018-04-26 RX ADMIN — DOCUSATE SODIUM 100 MG: 50 LIQUID ORAL at 22:22

## 2018-04-26 RX ADMIN — DEXAMETHASONE SODIUM PHOSPHATE 10 MG: 10 INJECTION, SOLUTION INTRAMUSCULAR; INTRAVENOUS at 15:11

## 2018-04-26 RX ADMIN — FENTANYL CITRATE 100 MCG: 50 INJECTION, SOLUTION INTRAMUSCULAR; INTRAVENOUS at 01:08

## 2018-04-26 RX ADMIN — FENTANYL CITRATE 100 MCG: 50 INJECTION, SOLUTION INTRAMUSCULAR; INTRAVENOUS at 11:27

## 2018-04-26 ASSESSMENT — PAIN DESCRIPTION - PAIN TYPE
TYPE: SURGICAL PAIN

## 2018-04-26 ASSESSMENT — PAIN SCALES - GENERAL
PAINLEVEL_OUTOF10: 9
PAINLEVEL_OUTOF10: 7
PAINLEVEL_OUTOF10: 0

## 2018-04-26 ASSESSMENT — PAIN DESCRIPTION - LOCATION
LOCATION: FACE
LOCATION: FACE

## 2018-04-26 ASSESSMENT — PULMONARY FUNCTION TESTS
PIF_VALUE: 18
PIF_VALUE: 17
PIF_VALUE: 11
PIF_VALUE: 11
PIF_VALUE: 12
PIF_VALUE: 11
PIF_VALUE: 12
PIF_VALUE: 17
PIF_VALUE: 11

## 2018-04-26 ASSESSMENT — PAIN DESCRIPTION - ORIENTATION
ORIENTATION: LEFT
ORIENTATION: LEFT

## 2018-04-27 ENCOUNTER — APPOINTMENT (OUTPATIENT)
Dept: GENERAL RADIOLOGY | Age: 33
DRG: 131 | End: 2018-04-27
Payer: COMMERCIAL

## 2018-04-27 ENCOUNTER — ANESTHESIA EVENT (OUTPATIENT)
Dept: OPERATING ROOM | Age: 33
DRG: 131 | End: 2018-04-27
Payer: COMMERCIAL

## 2018-04-27 ENCOUNTER — ANESTHESIA (OUTPATIENT)
Dept: OPERATING ROOM | Age: 33
DRG: 131 | End: 2018-04-27
Payer: COMMERCIAL

## 2018-04-27 VITALS
RESPIRATION RATE: 19 BRPM | SYSTOLIC BLOOD PRESSURE: 148 MMHG | OXYGEN SATURATION: 100 % | DIASTOLIC BLOOD PRESSURE: 98 MMHG

## 2018-04-27 LAB
ABSOLUTE EOS #: 0 K/UL (ref 0–0.44)
ABSOLUTE IMMATURE GRANULOCYTE: 0 K/UL (ref 0–0.3)
ABSOLUTE LYMPH #: 0.66 K/UL (ref 1.1–3.7)
ABSOLUTE MONO #: 0.38 K/UL (ref 0.1–1.2)
ANION GAP SERPL CALCULATED.3IONS-SCNC: 16 MMOL/L (ref 9–17)
BASOPHILS # BLD: 0 % (ref 0–2)
BASOPHILS ABSOLUTE: 0 K/UL (ref 0–0.2)
BUN BLDV-MCNC: 7 MG/DL (ref 6–20)
BUN/CREAT BLD: ABNORMAL (ref 9–20)
CALCIUM SERPL-MCNC: 9 MG/DL (ref 8.6–10.4)
CHLORIDE BLD-SCNC: 98 MMOL/L (ref 98–107)
CO2: 25 MMOL/L (ref 20–31)
CREAT SERPL-MCNC: 0.45 MG/DL (ref 0.7–1.2)
DIFFERENTIAL TYPE: ABNORMAL
EOSINOPHILS RELATIVE PERCENT: 0 % (ref 1–4)
GFR AFRICAN AMERICAN: >60 ML/MIN
GFR NON-AFRICAN AMERICAN: >60 ML/MIN
GFR SERPL CREATININE-BSD FRML MDRD: ABNORMAL ML/MIN/{1.73_M2}
GFR SERPL CREATININE-BSD FRML MDRD: ABNORMAL ML/MIN/{1.73_M2}
GLUCOSE BLD-MCNC: 179 MG/DL (ref 70–99)
HCT VFR BLD CALC: 33.5 % (ref 40.7–50.3)
HEMOGLOBIN: 11.2 G/DL (ref 13–17)
IMMATURE GRANULOCYTES: 0 %
LYMPHOCYTES # BLD: 14 % (ref 24–43)
MCH RBC QN AUTO: 35 PG (ref 25.2–33.5)
MCHC RBC AUTO-ENTMCNC: 33.4 G/DL (ref 28.4–34.8)
MCV RBC AUTO: 104.7 FL (ref 82.6–102.9)
MONOCYTES # BLD: 8 % (ref 3–12)
MORPHOLOGY: ABNORMAL
NRBC AUTOMATED: 0 PER 100 WBC
PDW BLD-RTO: 12 % (ref 11.8–14.4)
PLATELET # BLD: 207 K/UL (ref 138–453)
PLATELET ESTIMATE: ABNORMAL
PMV BLD AUTO: 9.7 FL (ref 8.1–13.5)
POTASSIUM SERPL-SCNC: 3.9 MMOL/L (ref 3.7–5.3)
RBC # BLD: 3.2 M/UL (ref 4.21–5.77)
RBC # BLD: ABNORMAL 10*6/UL
SEG NEUTROPHILS: 78 % (ref 36–65)
SEGMENTED NEUTROPHILS ABSOLUTE COUNT: 3.66 K/UL (ref 1.5–8.1)
SODIUM BLD-SCNC: 139 MMOL/L (ref 135–144)
TRIGL SERPL-MCNC: 83 MG/DL
WBC # BLD: 4.7 K/UL (ref 3.5–11.3)
WBC # BLD: ABNORMAL 10*3/UL

## 2018-04-27 PROCEDURE — 94003 VENT MGMT INPAT SUBQ DAY: CPT

## 2018-04-27 PROCEDURE — 2580000003 HC RX 258: Performed by: NURSE ANESTHETIST, CERTIFIED REGISTERED

## 2018-04-27 PROCEDURE — 6370000000 HC RX 637 (ALT 250 FOR IP): Performed by: EMERGENCY MEDICINE

## 2018-04-27 PROCEDURE — 2580000003 HC RX 258: Performed by: EMERGENCY MEDICINE

## 2018-04-27 PROCEDURE — 36415 COLL VENOUS BLD VENIPUNCTURE: CPT

## 2018-04-27 PROCEDURE — 6360000002 HC RX W HCPCS: Performed by: STUDENT IN AN ORGANIZED HEALTH CARE EDUCATION/TRAINING PROGRAM

## 2018-04-27 PROCEDURE — 3600000013 HC SURGERY LEVEL 3 ADDTL 15MIN: Performed by: SURGERY

## 2018-04-27 PROCEDURE — 85025 COMPLETE CBC W/AUTO DIFF WBC: CPT

## 2018-04-27 PROCEDURE — 3700000000 HC ANESTHESIA ATTENDED CARE: Performed by: SURGERY

## 2018-04-27 PROCEDURE — 7100000001 HC PACU RECOVERY - ADDTL 15 MIN: Performed by: SURGERY

## 2018-04-27 PROCEDURE — 94770 HC ETCO2 MONITOR DAILY: CPT

## 2018-04-27 PROCEDURE — 2500000003 HC RX 250 WO HCPCS: Performed by: NURSE ANESTHETIST, CERTIFIED REGISTERED

## 2018-04-27 PROCEDURE — G8997 SWALLOW GOAL STATUS: HCPCS

## 2018-04-27 PROCEDURE — 6370000000 HC RX 637 (ALT 250 FOR IP): Performed by: STUDENT IN AN ORGANIZED HEALTH CARE EDUCATION/TRAINING PROGRAM

## 2018-04-27 PROCEDURE — 6360000002 HC RX W HCPCS: Performed by: NURSE PRACTITIONER

## 2018-04-27 PROCEDURE — 71045 X-RAY EXAM CHEST 1 VIEW: CPT

## 2018-04-27 PROCEDURE — 6360000002 HC RX W HCPCS: Performed by: NURSE ANESTHETIST, CERTIFIED REGISTERED

## 2018-04-27 PROCEDURE — G8996 SWALLOW CURRENT STATUS: HCPCS

## 2018-04-27 PROCEDURE — 2000000000 HC ICU R&B

## 2018-04-27 PROCEDURE — 0BP1XDZ REMOVAL OF INTRALUMINAL DEVICE FROM TRACHEA, EXTERNAL APPROACH: ICD-10-PCS | Performed by: SURGERY

## 2018-04-27 PROCEDURE — 3700000001 HC ADD 15 MINUTES (ANESTHESIA): Performed by: SURGERY

## 2018-04-27 PROCEDURE — 3600000003 HC SURGERY LEVEL 3 BASE: Performed by: SURGERY

## 2018-04-27 PROCEDURE — 94762 N-INVAS EAR/PLS OXIMTRY CONT: CPT

## 2018-04-27 PROCEDURE — 2580000003 HC RX 258: Performed by: STUDENT IN AN ORGANIZED HEALTH CARE EDUCATION/TRAINING PROGRAM

## 2018-04-27 PROCEDURE — 92610 EVALUATE SWALLOWING FUNCTION: CPT

## 2018-04-27 PROCEDURE — 6360000002 HC RX W HCPCS: Performed by: EMERGENCY MEDICINE

## 2018-04-27 PROCEDURE — 84478 ASSAY OF TRIGLYCERIDES: CPT

## 2018-04-27 PROCEDURE — 80048 BASIC METABOLIC PNL TOTAL CA: CPT

## 2018-04-27 PROCEDURE — 7100000000 HC PACU RECOVERY - FIRST 15 MIN: Performed by: SURGERY

## 2018-04-27 RX ORDER — LIDOCAINE HYDROCHLORIDE 10 MG/ML
INJECTION, SOLUTION EPIDURAL; INFILTRATION; INTRACAUDAL; PERINEURAL PRN
Status: DISCONTINUED | OUTPATIENT
Start: 2018-04-27 | End: 2018-04-27 | Stop reason: SDUPTHER

## 2018-04-27 RX ORDER — ABACAVIR 20 MG/ML
600 SOLUTION ORAL DAILY
Status: DISCONTINUED | OUTPATIENT
Start: 2018-04-27 | End: 2018-04-28 | Stop reason: HOSPADM

## 2018-04-27 RX ORDER — PROPOFOL 10 MG/ML
INJECTION, EMULSION INTRAVENOUS PRN
Status: DISCONTINUED | OUTPATIENT
Start: 2018-04-27 | End: 2018-04-27 | Stop reason: SDUPTHER

## 2018-04-27 RX ORDER — LAMIVUDINE 10 MG/ML
300 SOLUTION ORAL DAILY
Status: DISCONTINUED | OUTPATIENT
Start: 2018-04-27 | End: 2018-04-28 | Stop reason: HOSPADM

## 2018-04-27 RX ORDER — ONDANSETRON 2 MG/ML
INJECTION INTRAMUSCULAR; INTRAVENOUS PRN
Status: DISCONTINUED | OUTPATIENT
Start: 2018-04-27 | End: 2018-04-27 | Stop reason: SDUPTHER

## 2018-04-27 RX ORDER — GLYCOPYRROLATE 1 MG/5 ML
SYRINGE (ML) INTRAVENOUS PRN
Status: DISCONTINUED | OUTPATIENT
Start: 2018-04-27 | End: 2018-04-27 | Stop reason: SDUPTHER

## 2018-04-27 RX ORDER — SODIUM CHLORIDE, SODIUM LACTATE, POTASSIUM CHLORIDE, CALCIUM CHLORIDE 600; 310; 30; 20 MG/100ML; MG/100ML; MG/100ML; MG/100ML
INJECTION, SOLUTION INTRAVENOUS CONTINUOUS PRN
Status: DISCONTINUED | OUTPATIENT
Start: 2018-04-27 | End: 2018-04-27 | Stop reason: SDUPTHER

## 2018-04-27 RX ADMIN — DOLUTEGRAVIR SODIUM 50 MG: 50 TABLET, FILM COATED ORAL at 16:56

## 2018-04-27 RX ADMIN — DOCUSATE SODIUM 100 MG: 50 LIQUID ORAL at 19:24

## 2018-04-27 RX ADMIN — FENTANYL CITRATE 100 MCG: 50 INJECTION, SOLUTION INTRAMUSCULAR; INTRAVENOUS at 08:02

## 2018-04-27 RX ADMIN — DOCUSATE SODIUM 100 MG: 50 LIQUID ORAL at 11:44

## 2018-04-27 RX ADMIN — HEPARIN SODIUM 5000 UNITS: 5000 INJECTION, SOLUTION INTRAVENOUS; SUBCUTANEOUS at 22:28

## 2018-04-27 RX ADMIN — Medication 300 MG: at 16:57

## 2018-04-27 RX ADMIN — HEPARIN SODIUM 5000 UNITS: 5000 INJECTION, SOLUTION INTRAVENOUS; SUBCUTANEOUS at 15:59

## 2018-04-27 RX ADMIN — ONDANSETRON 4 MG: 2 INJECTION INTRAMUSCULAR; INTRAVENOUS at 09:37

## 2018-04-27 RX ADMIN — OXYCODONE HYDROCHLORIDE 10 MG: 5 SOLUTION ORAL at 11:44

## 2018-04-27 RX ADMIN — SODIUM CHLORIDE, POTASSIUM CHLORIDE, SODIUM LACTATE AND CALCIUM CHLORIDE: 600; 310; 30; 20 INJECTION, SOLUTION INTRAVENOUS at 09:29

## 2018-04-27 RX ADMIN — DEXAMETHASONE SODIUM PHOSPHATE 10 MG: 10 INJECTION, SOLUTION INTRAMUSCULAR; INTRAVENOUS at 02:28

## 2018-04-27 RX ADMIN — Medication 10 ML: at 07:30

## 2018-04-27 RX ADMIN — AMPICILLIN SODIUM AND SULBACTAM SODIUM 1.5 G: 1; .5 INJECTION, POWDER, FOR SOLUTION INTRAMUSCULAR; INTRAVENOUS at 16:00

## 2018-04-27 RX ADMIN — AMPICILLIN SODIUM AND SULBACTAM SODIUM 1.5 G: 1; .5 INJECTION, POWDER, FOR SOLUTION INTRAMUSCULAR; INTRAVENOUS at 04:02

## 2018-04-27 RX ADMIN — PROPOFOL 25 MCG/KG/MIN: 10 INJECTION, EMULSION INTRAVENOUS at 02:55

## 2018-04-27 RX ADMIN — POTASSIUM CHLORIDE 20 MEQ: 1.5 POWDER, FOR SOLUTION ORAL at 11:44

## 2018-04-27 RX ADMIN — LIDOCAINE HYDROCHLORIDE 50 MG: 10 INJECTION, SOLUTION EPIDURAL; INFILTRATION; INTRACAUDAL; PERINEURAL at 09:34

## 2018-04-27 RX ADMIN — FENTANYL CITRATE 100 MCG: 50 INJECTION, SOLUTION INTRAMUSCULAR; INTRAVENOUS at 06:25

## 2018-04-27 RX ADMIN — PROPOFOL 40 MG: 10 INJECTION, EMULSION INTRAVENOUS at 09:38

## 2018-04-27 RX ADMIN — Medication 0.2 MG: at 09:36

## 2018-04-27 RX ADMIN — DEXAMETHASONE SODIUM PHOSPHATE 10 MG: 10 INJECTION, SOLUTION INTRAMUSCULAR; INTRAVENOUS at 07:30

## 2018-04-27 RX ADMIN — HEPARIN SODIUM 5000 UNITS: 5000 INJECTION, SOLUTION INTRAVENOUS; SUBCUTANEOUS at 06:14

## 2018-04-27 RX ADMIN — PROPOFOL 40 MG: 10 INJECTION, EMULSION INTRAVENOUS at 09:35

## 2018-04-27 RX ADMIN — SODIUM CHLORIDE, POTASSIUM CHLORIDE, SODIUM LACTATE AND CALCIUM CHLORIDE: 600; 310; 30; 20 INJECTION, SOLUTION INTRAVENOUS at 02:56

## 2018-04-27 RX ADMIN — ABACAVIR SULFATE 600 MG: 20 SOLUTION ORAL at 16:56

## 2018-04-27 RX ADMIN — LIDOCAINE HYDROCHLORIDE 50 MG: 10 INJECTION, SOLUTION EPIDURAL; INFILTRATION; INTRACAUDAL; PERINEURAL at 09:31

## 2018-04-27 RX ADMIN — FENTANYL CITRATE 100 MCG: 50 INJECTION, SOLUTION INTRAMUSCULAR; INTRAVENOUS at 00:17

## 2018-04-27 RX ADMIN — OXYCODONE HYDROCHLORIDE 10 MG: 5 SOLUTION ORAL at 19:24

## 2018-04-27 RX ADMIN — OXYCODONE HYDROCHLORIDE 10 MG: 5 SOLUTION ORAL at 04:02

## 2018-04-27 RX ADMIN — AMPICILLIN SODIUM AND SULBACTAM SODIUM 1.5 G: 1; .5 INJECTION, POWDER, FOR SOLUTION INTRAMUSCULAR; INTRAVENOUS at 22:27

## 2018-04-27 ASSESSMENT — PULMONARY FUNCTION TESTS
PIF_VALUE: 4
PIF_VALUE: 3
PIF_VALUE: 1
PIF_VALUE: 4
PIF_VALUE: 1
PIF_VALUE: 1
PIF_VALUE: 4
PIF_VALUE: 3
PIF_VALUE: 6
PIF_VALUE: 4
PIF_VALUE: 1
PIF_VALUE: 2
PIF_VALUE: 0
PIF_VALUE: 0
PIF_VALUE: 19
PIF_VALUE: 8
PIF_VALUE: 2
PIF_VALUE: 1
PIF_VALUE: 4
PIF_VALUE: 6
PIF_VALUE: 1
PIF_VALUE: 13
PIF_VALUE: 3

## 2018-04-27 ASSESSMENT — PAIN SCALES - GENERAL
PAINLEVEL_OUTOF10: 6
PAINLEVEL_OUTOF10: 0
PAINLEVEL_OUTOF10: 8
PAINLEVEL_OUTOF10: 9
PAINLEVEL_OUTOF10: 9
PAINLEVEL_OUTOF10: 8

## 2018-04-27 ASSESSMENT — PAIN DESCRIPTION - ORIENTATION: ORIENTATION: LEFT

## 2018-04-27 ASSESSMENT — PAIN DESCRIPTION - PAIN TYPE
TYPE: SURGICAL PAIN
TYPE: SURGICAL PAIN

## 2018-04-27 ASSESSMENT — PAIN DESCRIPTION - LOCATION
LOCATION: FACE
LOCATION: FACE

## 2018-04-28 VITALS
DIASTOLIC BLOOD PRESSURE: 65 MMHG | SYSTOLIC BLOOD PRESSURE: 114 MMHG | RESPIRATION RATE: 16 BRPM | OXYGEN SATURATION: 100 % | WEIGHT: 125.66 LBS | BODY MASS INDEX: 18.61 KG/M2 | HEIGHT: 69 IN | HEART RATE: 74 BPM | TEMPERATURE: 98.1 F

## 2018-04-28 PROCEDURE — 2580000003 HC RX 258: Performed by: EMERGENCY MEDICINE

## 2018-04-28 PROCEDURE — 6360000002 HC RX W HCPCS: Performed by: EMERGENCY MEDICINE

## 2018-04-28 PROCEDURE — 6370000000 HC RX 637 (ALT 250 FOR IP): Performed by: STUDENT IN AN ORGANIZED HEALTH CARE EDUCATION/TRAINING PROGRAM

## 2018-04-28 PROCEDURE — 6370000000 HC RX 637 (ALT 250 FOR IP): Performed by: EMERGENCY MEDICINE

## 2018-04-28 PROCEDURE — 94762 N-INVAS EAR/PLS OXIMTRY CONT: CPT

## 2018-04-28 PROCEDURE — 6360000002 HC RX W HCPCS: Performed by: NURSE PRACTITIONER

## 2018-04-28 RX ORDER — DIPHENHYDRAMINE HCL 12.5MG/5ML
50 LIQUID (ML) ORAL ONCE
Status: COMPLETED | OUTPATIENT
Start: 2018-04-28 | End: 2018-04-28

## 2018-04-28 RX ORDER — LAMIVUDINE 10 MG/ML
300 SOLUTION ORAL DAILY
Qty: 1 BOTTLE | Refills: 3 | Status: SHIPPED | OUTPATIENT
Start: 2018-04-29 | End: 2019-01-14 | Stop reason: CLARIF

## 2018-04-28 RX ORDER — AMOXICILLIN AND CLAVULANATE POTASSIUM 400; 57 MG/5ML; MG/5ML
800 POWDER, FOR SUSPENSION ORAL 2 TIMES DAILY
Qty: 140 ML | Refills: 0 | Status: SHIPPED | OUTPATIENT
Start: 2018-04-28 | End: 2018-05-05

## 2018-04-28 RX ORDER — OXYCODONE HCL 5 MG/5 ML
10 SOLUTION, ORAL ORAL EVERY 4 HOURS PRN
Qty: 120 ML | Refills: 0 | Status: SHIPPED | OUTPATIENT
Start: 2018-04-28 | End: 2018-05-01

## 2018-04-28 RX ORDER — ABACAVIR 20 MG/ML
600 SOLUTION ORAL DAILY
Qty: 1 BOTTLE | Refills: 3 | Status: SHIPPED | OUTPATIENT
Start: 2018-04-29 | End: 2019-01-14 | Stop reason: CLARIF

## 2018-04-28 RX ADMIN — HEPARIN SODIUM 5000 UNITS: 5000 INJECTION, SOLUTION INTRAVENOUS; SUBCUTANEOUS at 05:52

## 2018-04-28 RX ADMIN — DOLUTEGRAVIR SODIUM 50 MG: 50 TABLET, FILM COATED ORAL at 09:09

## 2018-04-28 RX ADMIN — Medication 300 MG: at 08:57

## 2018-04-28 RX ADMIN — Medication 10 ML: at 09:03

## 2018-04-28 RX ADMIN — DIPHENHYDRAMINE HYDROCHLORIDE 50 MG: 12.5 SOLUTION ORAL at 08:55

## 2018-04-28 RX ADMIN — AMPICILLIN SODIUM AND SULBACTAM SODIUM 1.5 G: 1; .5 INJECTION, POWDER, FOR SOLUTION INTRAMUSCULAR; INTRAVENOUS at 10:48

## 2018-04-28 RX ADMIN — ABACAVIR SULFATE 600 MG: 20 SOLUTION ORAL at 08:56

## 2018-04-28 RX ADMIN — OXYCODONE HYDROCHLORIDE 10 MG: 5 SOLUTION ORAL at 08:56

## 2018-04-28 RX ADMIN — AMPICILLIN SODIUM AND SULBACTAM SODIUM 1.5 G: 1; .5 INJECTION, POWDER, FOR SOLUTION INTRAMUSCULAR; INTRAVENOUS at 04:28

## 2018-04-28 ASSESSMENT — PAIN DESCRIPTION - LOCATION: LOCATION: FACE

## 2018-04-28 ASSESSMENT — PAIN DESCRIPTION - PAIN TYPE: TYPE: SURGICAL PAIN

## 2018-04-28 ASSESSMENT — PAIN SCALES - GENERAL: PAINLEVEL_OUTOF10: 7

## 2018-05-11 ENCOUNTER — OFFICE VISIT (OUTPATIENT)
Dept: INTERNAL MEDICINE | Age: 33
End: 2018-05-11
Payer: MEDICAID

## 2018-05-11 VITALS
DIASTOLIC BLOOD PRESSURE: 72 MMHG | WEIGHT: 123.6 LBS | HEART RATE: 124 BPM | SYSTOLIC BLOOD PRESSURE: 98 MMHG | BODY MASS INDEX: 19.86 KG/M2 | HEIGHT: 66 IN

## 2018-05-11 DIAGNOSIS — B20 HIV (HUMAN IMMUNODEFICIENCY VIRUS INFECTION) (HCC): ICD-10-CM

## 2018-05-11 DIAGNOSIS — S02.609A: Primary | ICD-10-CM

## 2018-05-11 PROCEDURE — 1111F DSCHRG MED/CURRENT MED MERGE: CPT | Performed by: INTERNAL MEDICINE

## 2018-05-11 PROCEDURE — 99203 OFFICE O/P NEW LOW 30 MIN: CPT | Performed by: INTERNAL MEDICINE

## 2018-05-11 PROCEDURE — G8420 CALC BMI NORM PARAMETERS: HCPCS | Performed by: INTERNAL MEDICINE

## 2018-05-11 PROCEDURE — 99213 OFFICE O/P EST LOW 20 MIN: CPT | Performed by: INTERNAL MEDICINE

## 2018-05-11 PROCEDURE — 4004F PT TOBACCO SCREEN RCVD TLK: CPT | Performed by: INTERNAL MEDICINE

## 2018-05-11 PROCEDURE — G8427 DOCREV CUR MEDS BY ELIG CLIN: HCPCS | Performed by: INTERNAL MEDICINE

## 2018-05-11 RX ORDER — OXYCODONE HCL 5 MG/5 ML
5 SOLUTION, ORAL ORAL
Qty: 20 ML | Refills: 0 | Status: SHIPPED | OUTPATIENT
Start: 2018-05-11 | End: 2018-05-15 | Stop reason: SDUPTHER

## 2018-05-11 ASSESSMENT — PATIENT HEALTH QUESTIONNAIRE - PHQ9
1. LITTLE INTEREST OR PLEASURE IN DOING THINGS: 0
2. FEELING DOWN, DEPRESSED OR HOPELESS: 0
SUM OF ALL RESPONSES TO PHQ QUESTIONS 1-9: 0
SUM OF ALL RESPONSES TO PHQ9 QUESTIONS 1 & 2: 0

## 2018-05-15 ENCOUNTER — OFFICE VISIT (OUTPATIENT)
Dept: SURGERY | Age: 33
End: 2018-05-15
Payer: COMMERCIAL

## 2018-05-15 VITALS
HEART RATE: 117 BPM | WEIGHT: 120.4 LBS | SYSTOLIC BLOOD PRESSURE: 126 MMHG | HEIGHT: 66 IN | BODY MASS INDEX: 19.35 KG/M2 | DIASTOLIC BLOOD PRESSURE: 80 MMHG

## 2018-05-15 DIAGNOSIS — Z09 FOLLOW-UP EXAMINATION: Primary | ICD-10-CM

## 2018-05-15 DIAGNOSIS — S02.609A: ICD-10-CM

## 2018-05-15 PROCEDURE — G8427 DOCREV CUR MEDS BY ELIG CLIN: HCPCS | Performed by: SPECIALIST

## 2018-05-15 PROCEDURE — 1111F DSCHRG MED/CURRENT MED MERGE: CPT | Performed by: SPECIALIST

## 2018-05-15 PROCEDURE — G8420 CALC BMI NORM PARAMETERS: HCPCS | Performed by: SPECIALIST

## 2018-05-15 PROCEDURE — 4004F PT TOBACCO SCREEN RCVD TLK: CPT | Performed by: SPECIALIST

## 2018-05-15 PROCEDURE — 99212 OFFICE O/P EST SF 10 MIN: CPT | Performed by: SPECIALIST

## 2018-05-15 RX ORDER — OXYCODONE HCL 5 MG/5 ML
5 SOLUTION, ORAL ORAL EVERY 6 HOURS PRN
Qty: 45 ML | Refills: 0 | Status: SHIPPED | OUTPATIENT
Start: 2018-05-15 | End: 2018-05-21

## 2018-05-15 ASSESSMENT — ENCOUNTER SYMPTOMS
NAUSEA: 0
SHORTNESS OF BREATH: 0
RHINORRHEA: 0
DIARRHEA: 0
ABDOMINAL PAIN: 0
VOMITING: 0

## 2019-01-14 ENCOUNTER — OFFICE VISIT (OUTPATIENT)
Dept: INFECTIOUS DISEASES | Age: 34
End: 2019-01-14
Payer: MEDICAID

## 2019-01-14 VITALS — DIASTOLIC BLOOD PRESSURE: 89 MMHG | SYSTOLIC BLOOD PRESSURE: 140 MMHG | TEMPERATURE: 97.5 F

## 2019-01-14 DIAGNOSIS — B20 AIDS (ACQUIRED IMMUNE DEFICIENCY SYNDROME) (HCC): ICD-10-CM

## 2019-01-14 DIAGNOSIS — B20 HUMAN IMMUNODEFICIENCY VIRUS (HCC): Primary | ICD-10-CM

## 2019-01-14 PROCEDURE — G8420 CALC BMI NORM PARAMETERS: HCPCS | Performed by: INTERNAL MEDICINE

## 2019-01-14 PROCEDURE — 4004F PT TOBACCO SCREEN RCVD TLK: CPT | Performed by: INTERNAL MEDICINE

## 2019-01-14 PROCEDURE — 99203 OFFICE O/P NEW LOW 30 MIN: CPT | Performed by: INTERNAL MEDICINE

## 2019-01-14 PROCEDURE — G8427 DOCREV CUR MEDS BY ELIG CLIN: HCPCS | Performed by: INTERNAL MEDICINE

## 2019-01-14 PROCEDURE — G8484 FLU IMMUNIZE NO ADMIN: HCPCS | Performed by: INTERNAL MEDICINE

## 2019-01-14 RX ORDER — SULFAMETHOXAZOLE AND TRIMETHOPRIM 800; 160 MG/1; MG/1
1 TABLET ORAL DAILY
Qty: 30 TABLET | Refills: 3 | Status: SHIPPED | OUTPATIENT
Start: 2019-01-14 | End: 2019-02-13

## 2019-01-14 ASSESSMENT — ENCOUNTER SYMPTOMS
GASTROINTESTINAL NEGATIVE: 1
ALLERGIC/IMMUNOLOGIC NEGATIVE: 1
RESPIRATORY NEGATIVE: 1

## 2023-01-15 ENCOUNTER — APPOINTMENT (OUTPATIENT)
Dept: GENERAL RADIOLOGY | Age: 38
End: 2023-01-15
Payer: MEDICAID

## 2023-01-15 ENCOUNTER — HOSPITAL ENCOUNTER (EMERGENCY)
Age: 38
Discharge: HOME | End: 2023-01-15
Attending: EMERGENCY MEDICINE
Payer: MEDICAID

## 2023-01-15 VITALS
SYSTOLIC BLOOD PRESSURE: 142 MMHG | RESPIRATION RATE: 20 BRPM | TEMPERATURE: 97.7 F | DIASTOLIC BLOOD PRESSURE: 107 MMHG | HEART RATE: 108 BPM | OXYGEN SATURATION: 99 %

## 2023-01-15 DIAGNOSIS — E87.1 HYPONATREMIA: ICD-10-CM

## 2023-01-15 DIAGNOSIS — F10.920 ACUTE ALCOHOLIC INTOXICATION WITHOUT COMPLICATION (HCC): ICD-10-CM

## 2023-01-15 DIAGNOSIS — R07.9 CHEST PAIN, UNSPECIFIED TYPE: Primary | ICD-10-CM

## 2023-01-15 LAB
ABSOLUTE EOS #: 0.1 K/UL (ref 0–0.44)
ABSOLUTE IMMATURE GRANULOCYTE: <0.03 K/UL (ref 0–0.3)
ABSOLUTE LYMPH #: 3.46 K/UL (ref 1.1–3.7)
ABSOLUTE MONO #: 0.95 K/UL (ref 0.1–1.2)
ALBUMIN SERPL-MCNC: 4.5 G/DL (ref 3.5–5.2)
ALBUMIN/GLOBULIN RATIO: 1.5 (ref 1–2.5)
ALP BLD-CCNC: 93 U/L (ref 40–129)
ALT SERPL-CCNC: 72 U/L (ref 5–41)
ANION GAP SERPL CALCULATED.3IONS-SCNC: 16 MMOL/L (ref 9–17)
AST SERPL-CCNC: 110 U/L
BASOPHILS # BLD: 1 % (ref 0–2)
BASOPHILS ABSOLUTE: 0.04 K/UL (ref 0–0.2)
BILIRUB SERPL-MCNC: 0.2 MG/DL (ref 0.3–1.2)
BUN BLDV-MCNC: 9 MG/DL (ref 6–20)
CALCIUM SERPL-MCNC: 9.1 MG/DL (ref 8.6–10.4)
CHLORIDE BLD-SCNC: 91 MMOL/L (ref 98–107)
CO2: 20 MMOL/L (ref 20–31)
CREAT SERPL-MCNC: 0.73 MG/DL (ref 0.7–1.2)
EOSINOPHILS RELATIVE PERCENT: 2 % (ref 1–4)
GFR SERPL CREATININE-BSD FRML MDRD: >60 ML/MIN/1.73M2
GLUCOSE BLD-MCNC: 95 MG/DL (ref 70–99)
HCT VFR BLD CALC: 38.1 % (ref 40.7–50.3)
HEMOGLOBIN: 13.5 G/DL (ref 13–17)
IMMATURE GRANULOCYTES: 0 %
LIPASE: 38 U/L (ref 13–60)
LYMPHOCYTES # BLD: 52 % (ref 24–43)
MAGNESIUM: 1.7 MG/DL (ref 1.6–2.6)
MCH RBC QN AUTO: 35.5 PG (ref 25.2–33.5)
MCHC RBC AUTO-ENTMCNC: 35.4 G/DL (ref 28.4–34.8)
MCV RBC AUTO: 100.3 FL (ref 82.6–102.9)
MONOCYTES # BLD: 14 % (ref 3–12)
NRBC AUTOMATED: 0 PER 100 WBC
PDW BLD-RTO: 13 % (ref 11.8–14.4)
PLATELET # BLD: 195 K/UL (ref 138–453)
PMV BLD AUTO: 9.3 FL (ref 8.1–13.5)
POTASSIUM SERPL-SCNC: 3.8 MMOL/L (ref 3.7–5.3)
PRO-BNP: <36 PG/ML
RBC # BLD: 3.8 M/UL (ref 4.21–5.77)
SEG NEUTROPHILS: 31 % (ref 36–65)
SEGMENTED NEUTROPHILS ABSOLUTE COUNT: 2.07 K/UL (ref 1.5–8.1)
SODIUM BLD-SCNC: 127 MMOL/L (ref 135–144)
TOTAL PROTEIN: 7.6 G/DL (ref 6.4–8.3)
TROPONIN, HIGH SENSITIVITY: <6 NG/L (ref 0–22)
WBC # BLD: 6.6 K/UL (ref 3.5–11.3)

## 2023-01-15 PROCEDURE — 83880 ASSAY OF NATRIURETIC PEPTIDE: CPT

## 2023-01-15 PROCEDURE — 84484 ASSAY OF TROPONIN QUANT: CPT

## 2023-01-15 PROCEDURE — 83735 ASSAY OF MAGNESIUM: CPT

## 2023-01-15 PROCEDURE — 71045 X-RAY EXAM CHEST 1 VIEW: CPT

## 2023-01-15 PROCEDURE — 83690 ASSAY OF LIPASE: CPT

## 2023-01-15 PROCEDURE — 99285 EMERGENCY DEPT VISIT HI MDM: CPT

## 2023-01-15 PROCEDURE — 80053 COMPREHEN METABOLIC PANEL: CPT

## 2023-01-15 PROCEDURE — 96374 THER/PROPH/DIAG INJ IV PUSH: CPT

## 2023-01-15 PROCEDURE — 85025 COMPLETE CBC W/AUTO DIFF WBC: CPT

## 2023-01-15 PROCEDURE — 6360000002 HC RX W HCPCS: Performed by: STUDENT IN AN ORGANIZED HEALTH CARE EDUCATION/TRAINING PROGRAM

## 2023-01-15 RX ORDER — KETOROLAC TROMETHAMINE 30 MG/ML
30 INJECTION, SOLUTION INTRAMUSCULAR; INTRAVENOUS ONCE
Status: COMPLETED | OUTPATIENT
Start: 2023-01-15 | End: 2023-01-15

## 2023-01-15 RX ADMIN — KETOROLAC TROMETHAMINE 30 MG: 30 INJECTION, SOLUTION INTRAMUSCULAR at 05:48

## 2023-01-15 ASSESSMENT — ENCOUNTER SYMPTOMS
NAUSEA: 0
ABDOMINAL PAIN: 0
SHORTNESS OF BREATH: 1
VOMITING: 0

## 2023-01-15 NOTE — ED NOTES
Patient pulling off monitor and getting dressed.  RN in room talking to patient      George , NEO  01/15/23 3605

## 2023-01-15 NOTE — ED NOTES
Portable xray at bedside. Tolerated well.  Will continue to monitor     Kathy Page RN  01/15/23 3909
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
## 2023-01-15 NOTE — ED PROVIDER NOTES
[facility] ED     Emergency Department     Faculty Attestation    I performed a history and physical examination of the patient and discussed management with the resident. I reviewed the residents note and agree with the documented findings and plan of care. Any areas of disagreement are noted on the chart. I was personally present for the key portions of any procedures. I have documented in the chart those procedures where I was not present during the key portions. I have reviewed the emergency nurses triage note. I agree with the chief complaint, past medical history, past surgical history, allergies, medications, social and family history as documented unless otherwise noted below. For Physician Assistant/ Nurse Practitioner cases/documentation I have personally evaluated this patient and have completed at least one if not all key elements of the E/M (history, physical exam, and MDM). Additional findings are as noted. Patient here with chest pain epigastric pain does admit to drinking tonight. Did not wish to come but was convinced to come by family. No nausea or vomiting. CTs had discomfort like this in the past with drinking but denies a known history of pancreatitis. On exam nontoxic well-appearing chatting with his family. Lungs clear and equal heart regular. Minimal epigastric tenderness no rebound or guarding. Will check EKG labs probable discharge    EKG interpretation: Sinus tachycardia 104 normal intervals normal axis no acute ST or T changes.   No acute findings except rate    Critical Care     none    Evan Vargas MD, [place]  Attending Emergency  Physician           Evan Vargas MD  01/15/23 4466
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
## 2023-10-22 ENCOUNTER — APPOINTMENT (OUTPATIENT)
Dept: CT IMAGING | Age: 38
End: 2023-10-22
Payer: MEDICAID

## 2023-10-22 ENCOUNTER — HOSPITAL ENCOUNTER (EMERGENCY)
Age: 38
Discharge: HOME | End: 2023-10-22
Attending: EMERGENCY MEDICINE
Payer: MEDICAID

## 2023-10-22 VITALS
OXYGEN SATURATION: 96 % | BODY MASS INDEX: 24.16 KG/M2 | WEIGHT: 145 LBS | HEART RATE: 97 BPM | RESPIRATION RATE: 21 BRPM | HEIGHT: 65 IN | DIASTOLIC BLOOD PRESSURE: 84 MMHG | SYSTOLIC BLOOD PRESSURE: 115 MMHG | TEMPERATURE: 98.5 F

## 2023-10-22 DIAGNOSIS — T30.0 BURN: Primary | ICD-10-CM

## 2023-10-22 LAB
ANION GAP SERPL CALCULATED.3IONS-SCNC: 16 MMOL/L (ref 9–17)
BLOOD BANK SPECIMEN: ABNORMAL
BODY TEMPERATURE: 37
BUN SERPL-MCNC: 7 MG/DL (ref 6–20)
CHLORIDE SERPL-SCNC: 94 MMOL/L (ref 98–107)
CO2 SERPL-SCNC: 20 MMOL/L (ref 20–31)
COHGB MFR BLD: 4.5 % (ref 0–5)
CREAT SERPL-MCNC: 0.7 MG/DL (ref 0.7–1.2)
ERYTHROCYTE [DISTWIDTH] IN BLOOD BY AUTOMATED COUNT: 12.5 % (ref 11.8–14.4)
ETHANOL PERCENT: 0.28 %
ETHANOLAMINE SERPL-MCNC: 283 MG/DL
FIO2 ON VENT: ABNORMAL %
GFR SERPL CREATININE-BSD FRML MDRD: >60 ML/MIN/1.73M2
GLUCOSE SERPL-MCNC: 96 MG/DL (ref 70–99)
HCG SERPL QL: NEGATIVE
HCO3 VENOUS: 21.5 MMOL/L (ref 24–30)
HCT VFR BLD AUTO: 36.7 % (ref 40.7–50.3)
HGB BLD-MCNC: 13.3 G/DL (ref 13–17)
INR PPP: 1.1
MCH RBC QN AUTO: 34.6 PG (ref 25.2–33.5)
MCHC RBC AUTO-ENTMCNC: 36.2 G/DL (ref 28.4–34.8)
MCV RBC AUTO: 95.6 FL (ref 82.6–102.9)
NEGATIVE BASE EXCESS, VEN: 2.4 MMOL/L (ref 0–2)
NRBC BLD-RTO: 0 PER 100 WBC
O2 SAT, VEN: 98.2 % (ref 60–85)
PARTIAL THROMBOPLASTIN TIME: 31.2 SEC (ref 23–36.5)
PCO2, VEN: 36.4 MM HG (ref 39–55)
PH VENOUS: 7.39 (ref 7.32–7.42)
PLATELET # BLD AUTO: 292 K/UL (ref 138–453)
PMV BLD AUTO: 8.4 FL (ref 8.1–13.5)
PO2, VEN: 133 MM HG (ref 30–50)
POTASSIUM SERPL-SCNC: 3.9 MMOL/L (ref 3.7–5.3)
PROTHROMBIN TIME: 13.6 SEC (ref 11.7–14.9)
RBC # BLD AUTO: 3.84 M/UL (ref 4.21–5.77)
SODIUM SERPL-SCNC: 130 MMOL/L (ref 135–144)
WBC OTHER # BLD: 5 K/UL (ref 3.5–11.3)

## 2023-10-22 PROCEDURE — 84703 CHORIONIC GONADOTROPIN ASSAY: CPT

## 2023-10-22 PROCEDURE — 99285 EMERGENCY DEPT VISIT HI MDM: CPT | Performed by: EMERGENCY MEDICINE

## 2023-10-22 PROCEDURE — 96374 THER/PROPH/DIAG INJ IV PUSH: CPT | Performed by: EMERGENCY MEDICINE

## 2023-10-22 PROCEDURE — 84520 ASSAY OF UREA NITROGEN: CPT

## 2023-10-22 PROCEDURE — 6360000002 HC RX W HCPCS: Performed by: STUDENT IN AN ORGANIZED HEALTH CARE EDUCATION/TRAINING PROGRAM

## 2023-10-22 PROCEDURE — 6360000004 HC RX CONTRAST MEDICATION: Performed by: SURGERY

## 2023-10-22 PROCEDURE — 80051 ELECTROLYTE PANEL: CPT

## 2023-10-22 PROCEDURE — 85730 THROMBOPLASTIN TIME PARTIAL: CPT

## 2023-10-22 PROCEDURE — 85610 PROTHROMBIN TIME: CPT

## 2023-10-22 PROCEDURE — 82565 ASSAY OF CREATININE: CPT

## 2023-10-22 PROCEDURE — 72125 CT NECK SPINE W/O DYE: CPT

## 2023-10-22 PROCEDURE — 70450 CT HEAD/BRAIN W/O DYE: CPT

## 2023-10-22 PROCEDURE — 82805 BLOOD GASES W/O2 SATURATION: CPT

## 2023-10-22 PROCEDURE — G0480 DRUG TEST DEF 1-7 CLASSES: HCPCS

## 2023-10-22 PROCEDURE — 6370000000 HC RX 637 (ALT 250 FOR IP): Performed by: STUDENT IN AN ORGANIZED HEALTH CARE EDUCATION/TRAINING PROGRAM

## 2023-10-22 PROCEDURE — 85027 COMPLETE CBC AUTOMATED: CPT

## 2023-10-22 PROCEDURE — 82947 ASSAY GLUCOSE BLOOD QUANT: CPT

## 2023-10-22 PROCEDURE — 71260 CT THORAX DX C+: CPT

## 2023-10-22 RX ORDER — IBUPROFEN 200 MG
TABLET ORAL 2 TIMES DAILY
Status: DISCONTINUED | OUTPATIENT
Start: 2023-10-22 | End: 2023-10-22 | Stop reason: HOSPADM

## 2023-10-22 RX ORDER — FENTANYL CITRATE 50 UG/ML
50 INJECTION, SOLUTION INTRAMUSCULAR; INTRAVENOUS ONCE
Status: COMPLETED | OUTPATIENT
Start: 2023-10-22 | End: 2023-10-22

## 2023-10-22 RX ADMIN — FENTANYL CITRATE 50 MCG: 50 INJECTION, SOLUTION INTRAMUSCULAR; INTRAVENOUS at 06:54

## 2023-10-22 RX ADMIN — IOPAMIDOL 75 ML: 755 INJECTION, SOLUTION INTRAVENOUS at 07:43

## 2023-10-22 RX ADMIN — POLYMYXIN B SULFATE, BACITRACIN ZINC, NEOMYCIN SULFATE: 5000; 3.5; 4 OINTMENT TOPICAL at 07:01

## 2023-10-22 ASSESSMENT — ENCOUNTER SYMPTOMS
CHEST TIGHTNESS: 0
PHOTOPHOBIA: 0
FACIAL SWELLING: 0
BACK PAIN: 0
ABDOMINAL DISTENTION: 0
SHORTNESS OF BREATH: 0
ABDOMINAL PAIN: 0
TROUBLE SWALLOWING: 0
BACK PAIN: 1

## 2023-10-22 ASSESSMENT — PAIN SCALES - GENERAL: PAINLEVEL_OUTOF10: 10

## 2023-10-22 ASSESSMENT — PAIN - FUNCTIONAL ASSESSMENT: PAIN_FUNCTIONAL_ASSESSMENT: 0-10
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
## 2023-10-22 NOTE — ED NOTES
The following labs were labeled with appropriate pt sticker and tubed to lab:     [x] Blue     [x] Lavender   [] on ice  [x] Green/yellow  [x] Green/black [] on ice  [] Salome Signs  [] on ice  [x] Yellow  [x] Red  [] Pink  [] Type/ Screen  [] ABG  [] VBG    [] COVID-19 swab    [] Rapid  [] PCR  [] Flu swab  [] Peds Viral Panel     [] Urine Sample  [] Fecal Sample  [] Pelvic Cultures  [] Blood Cultures  [] X 2  [] STREP Cultures       Neelam Cruz RN  10/22/23 0700
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
## 2023-10-22 NOTE — H&P
suspension Take 27.3 mLs by mouth every 6 hours as needed (mild-moderate pain) 5/15/18   Ruth Michael DO   dolutegravir sodium (TIVICAY) 50 MG tablet Take 1 tablet by mouth daily 4/29/18   Dbe Holland MD       ALLERGIES:   []  None    []   Information not available due to exam limitations documented above     Patient has no known allergies. PAST MEDICAL/SURGICAL HISTORY: []  None   []   Information not available due to exam limitations documented above      has a past medical history of HIV disease ([address] has a past surgical history that includes pr open treatment palatal/maxillary fracture (N/A, 4/25/2018); other surgical history (04/27/2017); pr office/outpt visit,procedure only (N/A, 4/27/2018); and Mouth surgery (Bilateral). FAMILY HISTORY   []   Information not available due to exam limitations documented above    family history includes High Blood Pressure in his mother. SOCIAL HISTORY  []   Information not available due to exam limitations documented above     reports that he has been smoking cigarettes. He has been smoking an average of .5 packs per day. He has never used smokeless tobacco.   reports current alcohol use of about 10.0 standard drinks of alcohol per week. reports no history of drug use. Review of Systems:    Review of Systems   Constitutional:  Positive for chills. Negative for activity change and fever. HENT:  Positive for ear pain. Negative for hearing loss. Eyes:  Negative for photophobia. Respiratory:  Negative for chest tightness and shortness of breath. Cardiovascular:  Negative for leg swelling. Gastrointestinal:  Negative for abdominal distention and abdominal pain. Genitourinary:  Negative for dysuria. Musculoskeletal:  Positive for back pain. Skin:  Positive for wound. Neurological:  Negative for dizziness, facial asymmetry and light-headedness. Psychiatric/Behavioral:  Negative for decreased concentration.             PHYSICAL

## 2023-10-22 NOTE — ED NOTES
Report given to [facility] RN  No change in patient status  Continues to rest quietly       Wilfredo Woodward RN  10/22/23 1484

## 2023-10-28 PROBLEM — T30.0 BURN: Status: ACTIVE | Noted: 2023-10-28

## 2024-04-16 ENCOUNTER — HOSPITAL ENCOUNTER (INPATIENT)
Age: 39
LOS: 4 days | Discharge: HOME | DRG: 885 | End: 2024-04-20
Attending: STUDENT IN AN ORGANIZED HEALTH CARE EDUCATION/TRAINING PROGRAM | Admitting: PSYCHIATRY & NEUROLOGY
Payer: COMMERCIAL

## 2024-04-16 DIAGNOSIS — R44.1 VISUAL HALLUCINATION: ICD-10-CM

## 2024-04-16 DIAGNOSIS — R45.851 SUICIDAL IDEATION: Primary | ICD-10-CM

## 2024-04-16 PROBLEM — F32.A DEPRESSION WITH SUICIDAL IDEATION: Status: ACTIVE | Noted: 2024-04-16

## 2024-04-16 LAB
ALBUMIN SERPL-MCNC: 4.2 G/DL (ref 3.5–5.2)
ALP SERPL-CCNC: 87 U/L (ref 40–129)
ALT SERPL-CCNC: 21 U/L (ref 5–41)
AMPHET UR QL SCN: NEGATIVE
ANION GAP SERPL CALCULATED.3IONS-SCNC: 15 MMOL/L (ref 9–17)
AST SERPL-CCNC: 21 U/L
BARBITURATES UR QL SCN: NEGATIVE
BENZODIAZ UR QL: NEGATIVE
BILIRUB SERPL-MCNC: 0.3 MG/DL (ref 0.3–1.2)
BUN SERPL-MCNC: 9 MG/DL (ref 6–20)
CALCIUM SERPL-MCNC: 8.9 MG/DL (ref 8.6–10.4)
CANNABINOIDS UR QL SCN: NEGATIVE
CHLORIDE SERPL-SCNC: 103 MMOL/L (ref 98–107)
CO2 SERPL-SCNC: 21 MMOL/L (ref 20–31)
COCAINE UR QL SCN: NEGATIVE
CREAT SERPL-MCNC: 0.7 MG/DL (ref 0.7–1.2)
ERYTHROCYTE [DISTWIDTH] IN BLOOD BY AUTOMATED COUNT: 14.6 % (ref 11.5–14.9)
ETHANOL PERCENT: 0.12 %
ETHANOLAMINE SERPL-MCNC: 121 MG/DL
FENTANYL UR QL: NEGATIVE
GFR SERPL CREATININE-BSD FRML MDRD: >90 ML/MIN/1.73M2
GLUCOSE SERPL-MCNC: 121 MG/DL (ref 70–99)
HCT VFR BLD AUTO: 37.1 % (ref 41–53)
HGB BLD-MCNC: 12.3 G/DL (ref 13.5–17.5)
MCH RBC QN AUTO: 33.4 PG (ref 26–34)
MCHC RBC AUTO-ENTMCNC: 33.2 G/DL (ref 31–37)
MCV RBC AUTO: 100.7 FL (ref 80–100)
METHADONE UR QL: NEGATIVE
OPIATES UR QL SCN: NEGATIVE
OXYCODONE UR QL SCN: NEGATIVE
PCP UR QL SCN: NEGATIVE
PLATELET # BLD AUTO: 260 K/UL (ref 150–450)
PMV BLD AUTO: 7.4 FL (ref 6–12)
POTASSIUM SERPL-SCNC: 3.6 MMOL/L (ref 3.7–5.3)
PROT SERPL-MCNC: 7.3 G/DL (ref 6.4–8.3)
RBC # BLD AUTO: 3.68 M/UL (ref 4.5–5.9)
SODIUM SERPL-SCNC: 139 MMOL/L (ref 135–144)
TEST INFORMATION: NORMAL
WBC OTHER # BLD: 3.3 K/UL (ref 3.5–11)

## 2024-04-16 PROCEDURE — 80053 COMPREHEN METABOLIC PANEL: CPT

## 2024-04-16 PROCEDURE — 1240000000 HC EMOTIONAL WELLNESS R&B

## 2024-04-16 PROCEDURE — 6370000000 HC RX 637 (ALT 250 FOR IP): Performed by: PSYCHIATRY & NEUROLOGY

## 2024-04-16 PROCEDURE — 99285 EMERGENCY DEPT VISIT HI MDM: CPT

## 2024-04-16 PROCEDURE — G0480 DRUG TEST DEF 1-7 CLASSES: HCPCS

## 2024-04-16 PROCEDURE — 36415 COLL VENOUS BLD VENIPUNCTURE: CPT

## 2024-04-16 PROCEDURE — 6370000000 HC RX 637 (ALT 250 FOR IP): Performed by: NURSE PRACTITIONER

## 2024-04-16 PROCEDURE — 85027 COMPLETE CBC AUTOMATED: CPT

## 2024-04-16 PROCEDURE — 80307 DRUG TEST PRSMV CHEM ANLYZR: CPT

## 2024-04-16 RX ORDER — AMLODIPINE BESYLATE 5 MG/1
5 TABLET ORAL DAILY
Status: DISCONTINUED | OUTPATIENT
Start: 2024-04-17 | End: 2024-04-20 | Stop reason: HOSPADM

## 2024-04-16 RX ORDER — AMLODIPINE BESYLATE 5 MG/1
5 TABLET ORAL DAILY
Status: ON HOLD | COMMUNITY
End: 2024-04-19

## 2024-04-16 RX ORDER — NAPROXEN 500 MG/1
500 TABLET ORAL 2 TIMES DAILY WITH MEALS
Status: ON HOLD | COMMUNITY
End: 2024-04-19 | Stop reason: HOSPADM

## 2024-04-16 RX ORDER — ATORVASTATIN CALCIUM 20 MG/1
20 TABLET, FILM COATED ORAL DAILY
Status: ON HOLD | COMMUNITY
End: 2024-04-19

## 2024-04-16 RX ORDER — POLYETHYLENE GLYCOL 3350 17 G
2 POWDER IN PACKET (EA) ORAL
Status: DISCONTINUED | OUTPATIENT
Start: 2024-04-16 | End: 2024-04-20 | Stop reason: HOSPADM

## 2024-04-16 RX ORDER — HALOPERIDOL 5 MG/1
5 TABLET ORAL EVERY 6 HOURS PRN
Status: DISCONTINUED | OUTPATIENT
Start: 2024-04-16 | End: 2024-04-20 | Stop reason: HOSPADM

## 2024-04-16 RX ORDER — HYDROXYZINE 50 MG/1
50 TABLET, FILM COATED ORAL 3 TIMES DAILY PRN
Status: DISCONTINUED | OUTPATIENT
Start: 2024-04-16 | End: 2024-04-20 | Stop reason: HOSPADM

## 2024-04-16 RX ORDER — POLYETHYLENE GLYCOL 3350 17 G/17G
17 POWDER, FOR SOLUTION ORAL DAILY PRN
Status: DISCONTINUED | OUTPATIENT
Start: 2024-04-16 | End: 2024-04-20 | Stop reason: HOSPADM

## 2024-04-16 RX ORDER — ACETAMINOPHEN 325 MG/1
650 TABLET ORAL EVERY 6 HOURS PRN
Status: DISCONTINUED | OUTPATIENT
Start: 2024-04-16 | End: 2024-04-20 | Stop reason: HOSPADM

## 2024-04-16 RX ORDER — NAPROXEN 500 MG/1
500 TABLET ORAL 2 TIMES DAILY WITH MEALS
Status: DISCONTINUED | OUTPATIENT
Start: 2024-04-17 | End: 2024-04-20 | Stop reason: HOSPADM

## 2024-04-16 RX ORDER — ABACAVIR SULFATE, DOLUTEGRAVIR SODIUM, LAMIVUDINE 600; 50; 300 MG/1; MG/1; MG/1
1 TABLET, FILM COATED ORAL DAILY
Status: ON HOLD | COMMUNITY
End: 2024-04-19 | Stop reason: HOSPADM

## 2024-04-16 RX ORDER — TRAZODONE HYDROCHLORIDE 50 MG/1
50 TABLET ORAL NIGHTLY PRN
Status: DISCONTINUED | OUTPATIENT
Start: 2024-04-16 | End: 2024-04-20 | Stop reason: HOSPADM

## 2024-04-16 RX ORDER — OLANZAPINE 15 MG/1
15 TABLET ORAL NIGHTLY
Status: DISCONTINUED | OUTPATIENT
Start: 2024-04-16 | End: 2024-04-20 | Stop reason: HOSPADM

## 2024-04-16 RX ORDER — IBUPROFEN 400 MG/1
400 TABLET ORAL EVERY 6 HOURS PRN
Status: DISCONTINUED | OUTPATIENT
Start: 2024-04-16 | End: 2024-04-20 | Stop reason: HOSPADM

## 2024-04-16 RX ORDER — DIPHENHYDRAMINE HYDROCHLORIDE 50 MG/ML
50 INJECTION INTRAMUSCULAR; INTRAVENOUS EVERY 6 HOURS PRN
Status: DISCONTINUED | OUTPATIENT
Start: 2024-04-16 | End: 2024-04-20 | Stop reason: HOSPADM

## 2024-04-16 RX ORDER — TRIAMCINOLONE ACETONIDE 1 MG/G
CREAM TOPICAL 2 TIMES DAILY
Status: DISCONTINUED | OUTPATIENT
Start: 2024-04-16 | End: 2024-04-20 | Stop reason: HOSPADM

## 2024-04-16 RX ORDER — ZOLPIDEM TARTRATE 10 MG/1
10 TABLET ORAL NIGHTLY PRN
Status: ON HOLD | COMMUNITY
End: 2024-04-19 | Stop reason: HOSPADM

## 2024-04-16 RX ORDER — MAGNESIUM HYDROXIDE/ALUMINUM HYDROXICE/SIMETHICONE 120; 1200; 1200 MG/30ML; MG/30ML; MG/30ML
30 SUSPENSION ORAL EVERY 6 HOURS PRN
Status: DISCONTINUED | OUTPATIENT
Start: 2024-04-16 | End: 2024-04-20 | Stop reason: HOSPADM

## 2024-04-16 RX ORDER — ATORVASTATIN CALCIUM 20 MG/1
20 TABLET, FILM COATED ORAL DAILY
Status: DISCONTINUED | OUTPATIENT
Start: 2024-04-17 | End: 2024-04-20 | Stop reason: HOSPADM

## 2024-04-16 RX ORDER — TRIAMCINOLONE ACETONIDE 1 MG/G
CREAM TOPICAL 2 TIMES DAILY
Status: ON HOLD | COMMUNITY
End: 2024-04-19

## 2024-04-16 RX ORDER — HALOPERIDOL 5 MG/ML
5 INJECTION INTRAMUSCULAR EVERY 6 HOURS PRN
Status: DISCONTINUED | OUTPATIENT
Start: 2024-04-16 | End: 2024-04-20 | Stop reason: HOSPADM

## 2024-04-16 RX ORDER — OLANZAPINE 15 MG/1
15 TABLET ORAL NIGHTLY
Status: ON HOLD | COMMUNITY
End: 2024-04-19

## 2024-04-16 RX ADMIN — TRAZODONE HYDROCHLORIDE 50 MG: 50 TABLET ORAL at 23:01

## 2024-04-16 RX ADMIN — HYDROXYZINE HYDROCHLORIDE 50 MG: 50 TABLET, FILM COATED ORAL at 23:01

## 2024-04-16 RX ADMIN — ACETAMINOPHEN 650 MG: 325 TABLET ORAL at 20:44

## 2024-04-16 RX ADMIN — OLANZAPINE 15 MG: 15 TABLET, FILM COATED ORAL at 21:31

## 2024-04-16 ASSESSMENT — SLEEP AND FATIGUE QUESTIONNAIRES
DO YOU USE A SLEEP AID: NO
DO YOU HAVE DIFFICULTY SLEEPING: YES
AVERAGE NUMBER OF SLEEP HOURS: 2
SLEEP PATTERN: RESTLESSNESS

## 2024-04-16 ASSESSMENT — PATIENT HEALTH QUESTIONNAIRE - PHQ9
5. POOR APPETITE OR OVEREATING: NOT AT ALL
3. TROUBLE FALLING OR STAYING ASLEEP: NEARLY EVERY DAY
SUM OF ALL RESPONSES TO PHQ QUESTIONS 1-9: 10
9. THOUGHTS THAT YOU WOULD BE BETTER OFF DEAD, OR OF HURTING YOURSELF: MORE THAN HALF THE DAYS
1. LITTLE INTEREST OR PLEASURE IN DOING THINGS: SEVERAL DAYS
SUM OF ALL RESPONSES TO PHQ QUESTIONS 1-9: 10
7. TROUBLE CONCENTRATING ON THINGS, SUCH AS READING THE NEWSPAPER OR WATCHING TELEVISION: NOT AT ALL
SUM OF ALL RESPONSES TO PHQ QUESTIONS 1-9: 8
4. FEELING TIRED OR HAVING LITTLE ENERGY: NOT AT ALL
10. IF YOU CHECKED OFF ANY PROBLEMS, HOW DIFFICULT HAVE THESE PROBLEMS MADE IT FOR YOU TO DO YOUR WORK, TAKE CARE OF THINGS AT HOME, OR GET ALONG WITH OTHER PEOPLE: VERY DIFFICULT
SUM OF ALL RESPONSES TO PHQ QUESTIONS 1-9: 10
6. FEELING BAD ABOUT YOURSELF - OR THAT YOU ARE A FAILURE OR HAVE LET YOURSELF OR YOUR FAMILY DOWN: MORE THAN HALF THE DAYS
2. FEELING DOWN, DEPRESSED OR HOPELESS: MORE THAN HALF THE DAYS
8. MOVING OR SPEAKING SO SLOWLY THAT OTHER PEOPLE COULD HAVE NOTICED. OR THE OPPOSITE, BEING SO FIGETY OR RESTLESS THAT YOU HAVE BEEN MOVING AROUND A LOT MORE THAN USUAL: NOT AT ALL
SUM OF ALL RESPONSES TO PHQ9 QUESTIONS 1 & 2: 3

## 2024-04-16 ASSESSMENT — ENCOUNTER SYMPTOMS
SHORTNESS OF BREATH: 0
ABDOMINAL PAIN: 0

## 2024-04-16 ASSESSMENT — LIFESTYLE VARIABLES
HOW OFTEN DO YOU HAVE A DRINK CONTAINING ALCOHOL: 4 OR MORE TIMES A WEEK
HOW OFTEN DO YOU HAVE A DRINK CONTAINING ALCOHOL: 4 OR MORE TIMES A WEEK
HOW MANY STANDARD DRINKS CONTAINING ALCOHOL DO YOU HAVE ON A TYPICAL DAY: 3 OR 4
HOW MANY STANDARD DRINKS CONTAINING ALCOHOL DO YOU HAVE ON A TYPICAL DAY: 3 OR 4

## 2024-04-16 NOTE — ED NOTES
Provisional Diagnosis:   Depression with suicidal ideations     Psychosocial and Contextual Factors:   Patient is a registered sex offender.  (homelessness, barriers to treatment)    C-SSRS Summary:      Patient: X  Family:   Agency: X-[facility]        Present Suicidal Behavior:  X    Verbal: Patient reports suicidal ideations with a plan to cut himself     Attempt:     Past Suicidal Behavior: X    Verbal:     Attempt:Patient reports cutting himself 3 weeks ago        Substance Abuse: Alcohol     Self-Injurious/Self-Mutilation: Patient denies       Violence Current or Past: None noted         Protective Factors:    Patient has housing. Patient has insurance. Patient is linked with mental health  provider.       Risk Factors:    Patient has no income. Patient is a sex offender. Patient has poor coping skills.       Clinical Summary:    Patient is a 39 year old male that is brought to the ED on a voluntary status for mental health needs.     Patient was upset upon arrival and not willing to provide answers to questions. Patient states \"I already talked to people\" and \"I'm just going to leave.\"     Per [facility] patient was seek at [facility] today but could not be admitted due to being a registered sex offender and when [facility] attempted to transfer the patient to the [facility] they were instructed that the patient would have to go through with ED for medical clearance.     Writer empathize with patient in regards to not feeling well, discussing personal issues with multiple people and being at multiple facilities today. Patient agrees to speak with ED physician and this writer at the same time to minimize the information he will need to provide.     Patient states he met with his therapist at [facility] yesterday who recommended he being admitted to the Crisis Stabilization Unit. Patient states he safety planned with his therapist so he could \"get some personal things taken care of\" and then returned today for admission. Patient states he  does not know why he was brought to this hospital.     Patient admits to having suicidal ideations with a plan to cut himself. Patient reports he tried to cut himself about three weeks ago. Patient reports he was obviously unsuccessful and did not seek any medical treatment at this time. When patient is asked about current stressors or events that could be contributing to these feelings he states \"a lot of things.\" Patient denies H/I at this time. Patient reports auditory and visual hallucinations that have been occurring over the past year.     Patient reports being linked with [facility] for outpatient mental health services. Patient reports being compliant with prescribed medications but does not know the name of them.     Patient reports having safe housing. Patient reports not currently having an income. Patient reports poor sleep but good appetite.     Patient is voluntary.    Level of Care Disposition:    Writer consulted with Dr. Martínez, psychiatrist. Patient is accepted to the [facility] for safety and stabilization.

## 2024-04-16 NOTE — BH NOTE
Patient arrived to [facility] via wheelchair. Patient signed consents and was cooperative with admission process.

## 2024-04-16 NOTE — BH NOTE
[facility]  Admission Note     Admission Type:   Admission Type: Voluntary    Reason for admission:  Reason for Admission: Patient reported to outpatient doctor that he had fleeting suicidal ideations. Doctor reffered patient to [facility] for evaluation. Due to Patient's status as a sex offender, [facility] brought patient to [facility] ED for evaluation. In ED patient reported fleeting SI to cut self, deprssion and audtiory command hallucinations to kill self. Patient reported to ER staff he tried to cut self three weeks ago but did not seek treatment.      Addictive Behavior:   Addictive Behavior  In the Past 3 Months, Have You Felt or Has Someone Told You That You Have a Problem With  : None    Medical Problems:   Past Medical History:   Diagnosis Date    Depression with suicidal ideation 4/16/2024    HIV disease ([facility])        Status EXAM:  Mental Status and Behavioral Exam  Normal: No  Level of Assistance: Independent/Self  Facial Expression: Expressionless, Flat  Affect: Blunt  Level of Consciousness: Alert  Frequency of Checks: 4 times per hour, close  Mood:Normal: No  Mood: Depressed, Anxious, Irritable  Motor Activity:Normal: No  Motor Activity: Decreased  Eye Contact: Poor  Observed Behavior: Guarded, Withdrawn  Sexual Misconduct History: Current - no  Preception: [place] to person, [place] to time, [place] to place, [place] to situation  Attention:Normal: No  Attention: Unable to concentrate  Thought Processes: Blocking  Thought Content:Normal: No  Thought Content: Poverty of content, Preoccupations  Depression Symptoms: Change in energy level, Sleep disturbance, Feelings of helplessness, Feelings of hopelessess, Loss of interest, Isolative, Increased irritability, Impaired concentration  Anxiety Symptoms: Generalized  Shanae Symptoms: No problems reported or observed.  Hallucinations: Other (comment) (did not admit to writer command hallucinations but told ER staff that he was having

## 2024-04-16 NOTE — PROGRESS NOTES
Pharmacy Medication History Note      List of current medications patient is taking is complete.     Source of information: patient, dispense report, OARRS     Changes made to medication list:  Medications flagged for removal (include reason, ex. noncompliance):  None     Medications removed (include reason, ex. therapy complete or physician discontinued):  Dolutegravir - changed to combination product Triumeq  Ibuprofen - course complete. Patient now taking naproxen.     Medications added/doses adjusted:  Triumeq 600- mg take 1 tab daily   Amlodipine 5 mg daily   Atorvastatin 20 mg daily   Naproxen 500 mg twice daily   Olanzapine 15 mg nightly   Triamcinolone 0.1% cream twice daily   Zolpidem 10 mg nightly as needed     Other notes (ex. Recent course of antibiotics, Coumadin dosing):  Per OARRS, last fill of zolpidem was on 3/15/24 with quantity 30 for 30 days.   Per OARRS, the patient filled temazepam 15 mg on 2/15/24 with quantity 30 for 30 days.     Denies use of other OTC or herbal medications.      Allergies clarified    Medication list provided to the patient: no  Medication education provided to the patient: none    Prior to Admission Medications   Prescriptions Last Dose Informant Patient Reported? Taking?   OLANZapine (ZYPREXA) 15 MG tablet   Yes Yes   Sig: Take 1 tablet by mouth nightly   abacavir-dolutegravir-lamiVUDine (TRIUMEQ) 600- MG TABS   Yes Yes   Sig: Take 1 tablet by mouth daily   amLODIPine (NORVASC) 5 MG tablet   Yes Yes   Sig: Take 1 tablet by mouth daily   atorvastatin (LIPITOR) 20 MG tablet   Yes Yes   Sig: Take 1 tablet by mouth daily                          naproxen (NAPROSYN) 500 MG tablet   Yes Yes   Sig: Take 1 tablet by mouth 2 times daily (with meals)   triamcinolone (KENALOG) 0.1 % cream   Yes Yes   Sig: Apply topically 2 times daily Apply topically 2 times daily.   zolpidem (AMBIEN) 10 MG tablet   Yes Yes   Sig: Take 1 tablet by mouth nightly as needed for Sleep. Max  Daily Amount: 10 mg      Facility-Administered Medications: None           Electronically signed by Laura Worthington RPH on 4/16/2024 at 2:43 PM

## 2024-04-16 NOTE — ED PROVIDER NOTES
negative.      PHYSICAL EXAM   (up to 7 for level 4, 8 or more for level 5)    INITIAL VITALS:   ED Triage Vitals [04/16/24 1150]   BP Temp Temp Source Pulse Respirations SpO2 Height Weight - Scale   (!) 140/95 97.8 °F (36.6 °C) Oral 88 15 100 % 1.651 m (5' 5\") 68 kg (150 lb)       Physical Exam  Vitals and nursing note reviewed.   Constitutional:       General: He is not in acute distress.     Appearance: Normal appearance.   Cardiovascular:      Rate and Rhythm: Normal rate and regular rhythm.      Pulses: Normal pulses.           Radial pulses are 2+ on the right side and 2+ on the left side.   Pulmonary:      Effort: Pulmonary effort is normal. No respiratory distress.      Breath sounds: No decreased breath sounds.   Skin:     General: Skin is warm and dry.      Capillary Refill: Capillary refill takes less than 2 seconds.   Neurological:      General: No focal deficit present.      Mental Status: He is alert and oriented to person, place, and time.   Psychiatric:         Attention and Perception: He perceives visual hallucinations.         Mood and Affect: Affect is flat.         Behavior: Behavior is withdrawn.         Thought Content: Thought content includes suicidal ideation. Thought content does not include homicidal ideation. Thought content includes suicidal plan. Thought content does not include homicidal plan.         DIFFERENTIAL  DIAGNOSIS   PLAN (LABS / IMAGING / EKG):  Orders Placed This Encounter   Procedures    Comprehensive Metabolic Panel    Ethanol    Urine Drug Screen    CBC    Admit to Behavioral Health    Suicide precautions       MEDICATIONS ORDERED:  No orders of the defined types were placed in this encounter.        DIAGNOSTIC RESULTS / EMERGENCYDEPARTMENT COURSE / MDM   LABS:  Labs Reviewed   COMPREHENSIVE METABOLIC PANEL - Abnormal; Notable for the following components:       Result Value    Potassium 3.6 (*)     Glucose 121 (*)     All other components within normal limits   ETHANOL  medications on file       Samuel Valles DO  Emergency Medicine Attending    (Please note that portions of this note were completed with a voice recognition program.  Efforts were made to edit the dictations but occasionally words are mis-transcribed.)          Samuel Valles DO  04/16/24 [id number]

## 2024-04-16 NOTE — BH NOTE
Patient given tobacco quitline number 71514809753 at this time, refusing to call at this time, states \" I just dont want to quit now\"- patient given information as to the dangers of long term tobacco use. Continue to reinforce the importance of tobacco cessation.

## 2024-04-16 NOTE — BH NOTE
On call provider notified of best practice advisory suggesting patient to be placed on suicide precautions. Provider to discontinue order as patient does not meet criteria for suicide precautions at this time. Continue to observe patient on every 15 minute checks.

## 2024-04-17 PROBLEM — R45.851 SUICIDAL IDEATION: Status: ACTIVE | Noted: 2024-04-17

## 2024-04-17 PROBLEM — F33.3 MAJOR DEPRESSIVE DISORDER, RECURRENT, SEVERE WITH PSYCHOTIC FEATURES (HCC): Status: ACTIVE | Noted: 2024-04-17

## 2024-04-17 PROCEDURE — 6370000000 HC RX 637 (ALT 250 FOR IP): Performed by: PSYCHIATRY & NEUROLOGY

## 2024-04-17 PROCEDURE — 93005 ELECTROCARDIOGRAM TRACING: CPT

## 2024-04-17 PROCEDURE — 1240000000 HC EMOTIONAL WELLNESS R&B

## 2024-04-17 PROCEDURE — APPSS60 APP SPLIT SHARED TIME 46-60 MINUTES: Performed by: NURSE PRACTITIONER

## 2024-04-17 PROCEDURE — 6370000000 HC RX 637 (ALT 250 FOR IP): Performed by: NURSE PRACTITIONER

## 2024-04-17 PROCEDURE — 90792 PSYCH DIAG EVAL W/MED SRVCS: CPT | Performed by: PSYCHIATRY & NEUROLOGY

## 2024-04-17 PROCEDURE — 99223 1ST HOSP IP/OBS HIGH 75: CPT | Performed by: INTERNAL MEDICINE

## 2024-04-17 RX ORDER — FLUOXETINE 10 MG/1
10 CAPSULE ORAL DAILY
Status: DISCONTINUED | OUTPATIENT
Start: 2024-04-17 | End: 2024-04-18

## 2024-04-17 RX ADMIN — NAPROXEN 500 MG: 500 TABLET ORAL at 09:16

## 2024-04-17 RX ADMIN — TRAZODONE HYDROCHLORIDE 50 MG: 50 TABLET ORAL at 23:00

## 2024-04-17 RX ADMIN — ATORVASTATIN CALCIUM 20 MG: 20 TABLET, FILM COATED ORAL at 09:16

## 2024-04-17 RX ADMIN — NAPROXEN 500 MG: 500 TABLET ORAL at 17:24

## 2024-04-17 RX ADMIN — OLANZAPINE 15 MG: 15 TABLET, FILM COATED ORAL at 21:42

## 2024-04-17 RX ADMIN — AMLODIPINE BESYLATE 5 MG: 5 TABLET ORAL at 09:16

## 2024-04-17 RX ADMIN — HYDROXYZINE HYDROCHLORIDE 50 MG: 50 TABLET, FILM COATED ORAL at 21:41

## 2024-04-17 ASSESSMENT — LIFESTYLE VARIABLES
HOW MANY STANDARD DRINKS CONTAINING ALCOHOL DO YOU HAVE ON A TYPICAL DAY: 1 OR 2
HOW OFTEN DO YOU HAVE A DRINK CONTAINING ALCOHOL: MONTHLY OR LESS

## 2024-04-17 ASSESSMENT — PAIN - FUNCTIONAL ASSESSMENT: PAIN_FUNCTIONAL_ASSESSMENT: ACTIVITIES ARE NOT PREVENTED

## 2024-04-17 ASSESSMENT — PAIN SCALES - GENERAL: PAINLEVEL_OUTOF10: 6

## 2024-04-17 ASSESSMENT — PAIN DESCRIPTION - LOCATION: LOCATION: FOOT

## 2024-04-17 NOTE — GROUP NOTE
Psych-Ed/Relapse Prevention Group Note        Date: April 17, 2024 Start Time: 11am  End Time: 11:45am      Number of Participants in Group & Unit Census:  5/16    Topic: Socialization skills    Goal of Group:Patient will demonstrate improved interpersonal skills      Comments:     Patient did not participate in Psych-Ed/Relapse Prevention group, despite staff encouragement and explanation of benefits.  Patient remain seclusive to self.  Q15 minute safety checks maintained for patient safety and will continue to encourage patient to attend unit programming.         Signature:  LUZ ContrerasS

## 2024-04-17 NOTE — PROGRESS NOTES
RT ASSESSMENT TREATMENT GOALS    [x]Pt Goal:  Pt will identify 1-2 positive coping skills by time of discharge.    [x]Pt Goal:  Pt will identify 1-2 positive aspects of self by time of discharge.    []Pt Goal:  Pt will remain on task/topic for 15-30 minutes during group by time of discharge.    []Pt Goal:  Pt will identify 1-2 aspects of relapse prevention plan by time of discharge.    [x]Pt Goal:  Pt will join in conversation with peers 1-2 times per group by time of discharge.    []Pt Goal:  Pt will identify 1-2 new leisure interests by time of discharge.    []Pt Goal: Pt will maintain behavorial control until the time of discharge.

## 2024-04-17 NOTE — PLAN OF CARE
Problem: Self Harm/Suicidality  Goal: Will have no self-injury during hospital stay  Description: INTERVENTIONS:  1.  Ensure constant observer at bedside with Q15M safety checks  2.  Maintain a safe environment  3.  Secure patient belongings  4.  Ensure family/visitors adhere to safety recommendations  5.  Ensure safety tray has been added to patient's diet order  6.  Every shift and PRN: Re-assess suicidal risk via Frequent Screener    Outcome: Progressing  Note: Patient denies thoughts of self-harm or harm to others during this shift. Staff encouraged patient to notify staff if thoughts of self-harm or harm to others occur. Staff ensure patient safety by intermediate checks for safety every 15 minutes.         Problem: Depression  Goal: Will be euthymic at discharge  Description: INTERVENTIONS:  1. Administer medication as ordered  2. Provide emotional support via 1:1 interaction with staff  3. Encourage involvement in milieu/groups/activities  4. Monitor for social isolation  Outcome: Progressing  Note: Patient displays anxiety and depression. Staff ensure safety by providing safety checks on the unit intermittently and every 15 minutes. Patient is encouraged to notify staff if anxiety and depression occurs.
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
## 2024-04-17 NOTE — H&P
IN-PATIENT SERVICE  [facility]    HISTORY AND PHYSICAL EXAMINATION/ CONSULT NOTE            Date:   4/17/2024  Patient name:  Gary Jones  Date of admission:  4/16/2024 11:43 AM  MRN:   [id number]  Account:  [id number]  YOB: 1985  PCP:    No primary care provider on file.  Room:   [address] [place] [address]  Code Status:    Full Code    Physician Requesting Consult: Emery Hardy MD    Reason for Consult: History and physical, medical management    Chief Complaint:     Chief Complaint   Patient presents with    Mental Health Problem     Medical management    History Obtained From:     Patient, EMR, nursing staff    History of Present Illness:     39-year-old male admitted for depression with psychotic features    Medical history relevant for HIV patient reports he has been compliant with medication and is following with [facility].,  Also has history of hypertension, hyperlipidemia    HTN  Onset more than 2 years ago  Mini: mild to mod  Usually controlled with current po meds  Not associated with headaches or blurry vision  No chest pain      Past Medical History:     Past Medical History:   Diagnosis Date    Depression with suicidal ideation 4/16/2024    HIV disease (HCC)         Past Surgical History:     Past Surgical History:   Procedure Laterality Date    MOUTH SURGERY Bilateral     OTHER SURGICAL HISTORY  04/27/2017    extubatio in OR    [facility] OFFICE/OUTPT VISIT,PROCEDURE ONLY N/A 4/27/2018    EXTUBATION OF ARTIFICIAL AIRWAY performed by Kiana Camarena MD at [facility] OR    NH OPEN TREATMENT PALATAL/MAXILLARY FRACTURE N/A 4/25/2018    ORIF SYMPHYSIS, CLOSED REDUCTION LEFT RAMUS performed by Eliseo Barrow DDS at [facility] OR        Medications Prior to Admission:     Prior to Admission medications    Medication Sig Start Date End Date Taking? Authorizing Provider   abacavir-dolutegravir-lamiVUDine (TRIUMEQ) 600- MG TABS Take 1 tablet by mouth daily   Yes Provider, MD Raad 
fair grooming   Behavior/Motor: Guarded, withdrawn, minimally engages with interviewer, no psychomotor abnormalities  Attitude toward examiner: Somewhat cooperative, attentive, poor eye contact  Speech: Creased rate and volume, irritable tone, limited output  Mood: Depressed, withdrawn  Affect: Mood congruent  Thought processes:  Goal directed, linear  Thought content: Active suicidal ideations, with a  current plan or intent, contracts for safety on the unit.               Denies homicidal ideations               Endorses recent AV hallucinations              Denies delusions              Denies paranoia  Cognition:  Oriented to self, location, time, situation  Concentration: Clinically adequate  Memory: Intact  Insight & Judgment: Poor         DSM-5 Diagnosis    Principal Problem: Major depressive disorder, recurrent, severe with psychotic features ([facility])      Psychosocial and Contextual factors:  Financial X  Occupational X  Relationship   Legal   Living situation X  Educational     Past Medical History:   Diagnosis Date    Depression with suicidal ideation 4/16/2024    HIV disease ([facility])         PATIENT HANDOFF:  Home medications reviewed, verified/restarted   Medication management per attending  Monitor need and frequency of PRN medications.    CONSULT:  [x] Yes [] No  Internal medicine for medical management/medical H&P      Risk Management: close watch per standard protocol      Psychotherapy: participation in milieu and group and individual sessions with Attending Physician,  and Physician Assistant/CNP      Estimated length of stay:  2-14 days      GENERAL PATIENT/FAMILY EDUCATION  Patient will understand basic signs and symptoms, patient will understand benefits/risks and potential side effects from proposed medications, and patient will understand their role in recovery.  Family is  active in patient's care.   Patient assets that may be helpful during treatment include: Intent to participate

## 2024-04-17 NOTE — PLAN OF CARE
[facility]  Initial Interdisciplinary Treatment Plan Note      Original treatment plan Date & Time: 4/17/2024 1300    Admission Type:  Admission Type: Voluntary    Reason for admission:   Reason for Admission: Patient reported to outpatient doctor that he had fleeting suicidal ideations. Doctor reffered patient to [facility] for evaluation. Due to Patient's status as a sex offender, [facility] brought patient to [facility] ED for evaluation. In ED patient reported fleeting SI to cut self, deprssion and audtiory command hallucinations to kill self. Patient reported to ER staff he tried to cut self three weeks ago but did not seek treatment.    Estimated Length of Stay:  5-7days  Estimated Discharge Date: To be determined by physician.    PATIENT STRENGTHS:  Patient Strengths:   Patient Strengths and Limitations:   Addictive Behavior: Addictive Behavior  In the Past 3 Months, Have You Felt or Has Someone Told You That You Have a Problem With  : None  Medical Problems:  Past Medical History:   Diagnosis Date    Depression with suicidal ideation 4/16/2024    HIV disease ([facility])      Status EXAM:Mental Status and Behavioral Exam  Normal: No (isolative)  Level of Assistance: Independent/Self  Facial Expression: Flat  Affect: Blunt  Level of Consciousness: Alert  Frequency of Checks: 4 times per hour, close  Mood:Normal: No  Mood: Depressed, Anxious  Motor Activity:Normal: No  Motor Activity: Decreased  Eye Contact: Fair  Observed Behavior: Withdrawn, Guarded  Sexual Misconduct History: Current - no  Preception: [place] to person, [place] to time, [place] to place, [place] to situation  Attention:Normal: Yes  Attention: Unable to concentrate  Thought Processes: Blocking  Thought Content:Normal: No  Thought Content: Preoccupations  Depression Symptoms: Change in energy level, Feelings of helplessness, Feelings of hopelessess, Isolative  Anxiety Symptoms: Generalized  Shanae Symptoms: No problems reported or

## 2024-04-17 NOTE — GROUP NOTE
Group Therapy Note    Date: 2024    Group Start Time: 900  Group End Time: 930  Group Topic: Orientation Group    [facility] Herminia Caballero LPN        Group Therapy Note    Attendees:          Patient's Goal:  ***    Notes:  ***    Status After Intervention:  {Status After Intervention:818135674}    Participation Level: {Participation Level:419917467}    Participation Quality: {[facility] PARTICIPATION QUALITY:450527807}      Speech:  {ED  CD_SPEECH:92278}      Thought Process/Content: {Thought Process/Content:616491965}      Affective Functioning: {Affective Functionin}      Mood: {Mood:551788475}      Level of consciousness:  {Level of consciousness:092725624}      Response to Learning: {[facility] Responses to Learnin}      Endings: {[facility] Endings:82841}    Modes of Intervention: {[facility] Modes of Intervention:633667592}      Discipline Responsible: {[facility] Multidisciplinary:076584510}      Signature:  Herminia Ferraro LPN

## 2024-04-17 NOTE — BH NOTE
EKG completed. \"Sinus bradycardia nonspecific T wave abnormality. Abnormal ECG.\"  Vent rate: 54 bpm  SD interval 160 ms  QRS duration 88  QT/Ofs028/398 ms  PRT axes 54 7 31  Internal medicine provider informed.

## 2024-04-17 NOTE — PROGRESS NOTES
Behavioral Services  Medicare Certification Upon Admission    I certify that this patient's inpatient psychiatric hospital admission is medically necessary for:    [x] (1) Treatment which could reasonably be expected to improve this patient's condition,       [x] (2) Or for diagnostic study;     AND     [x](2) The inpatient psychiatric services are provided while the individual is under the care of a physician and are included in the individualized plan of care.    Estimated length of stay/service 4 to 7 days    Plan for post-hospital care home with outpatient community mental health follow-up    Electronically signed by TREASURE FONSECA MD on 4/17/2024 at 3:14 PM

## 2024-04-17 NOTE — PLAN OF CARE
Problem: Self Harm/Suicidality  Goal: Will have no self-injury during hospital stay  Description: INTERVENTIONS:  1.  Ensure constant observer at bedside with Q15M safety checks  2.  Maintain a safe environment  3.  Secure patient belongings  4.  Ensure family/visitors adhere to safety recommendations  5.  Ensure safety tray has been added to patient's diet order  6.  Every shift and PRN: Re-assess suicidal risk via Frequent Screener    4/17/2024 1004 by Sy Aranda, RN  Outcome: Progressing     Problem: Depression  Goal: Will be euthymic at discharge  Description: INTERVENTIONS:  1. Administer medication as ordered  2. Provide emotional support via 1:1 interaction with staff  3. Encourage involvement in milieu/groups/activities  4. Monitor for social isolation  4/17/2024 1004 by Sy Aranda, RN  Outcome: Progressing   Patient denies thoughts of suicide or self harm. Denies any hallucinations, or delusions. Patient has slept well, eating well   General anxiety. Remains Isolative to room, attends no groups, mood depressive. 1-1 for walking boot. Will continue to provide encouragement and support as needed. Safe environment maintained. Safety checks continued every 15 minutes.

## 2024-04-17 NOTE — GROUP NOTE
Group Therapy Note    Date: 4/17/2024    Group Start Time: 0955  Group End Time: 1036  Group Topic: Psychotherapy    [facility] Jackeline Low MSW, KAMRAN        Group Therapy Note    Attendees: 7/16       Patient refused to attend psychotherapy group at 10:00 am after encouragement from staff.  1:1 talk time provided as alternative to group session.        Discipline Responsible: /Counselor      Signature:  MARCIE Gonzalez LSW

## 2024-04-18 LAB
CHOLEST SERPL-MCNC: 122 MG/DL
CHOLESTEROL/HDL RATIO: 2.2
EST. AVERAGE GLUCOSE BLD GHB EST-MCNC: 114 MG/DL
FERRITIN SERPL-MCNC: 522 NG/ML (ref 30–400)
FOLATE SERPL-MCNC: 15.7 NG/ML (ref 4.8–24.2)
HBA1C MFR BLD: 5.6 % (ref 4–6)
HDLC SERPL-MCNC: 56 MG/DL
IRON SATN MFR SERPL: 27 % (ref 20–55)
IRON SERPL-MCNC: 87 UG/DL (ref 61–157)
LDLC SERPL CALC-MCNC: 45 MG/DL (ref 0–130)
TIBC SERPL-MCNC: 319 UG/DL (ref 250–450)
TRIGL SERPL-MCNC: 104 MG/DL
TSH SERPL DL<=0.05 MIU/L-ACNC: 2.03 UIU/ML (ref 0.3–5)
UNSATURATED IRON BINDING CAPACITY: 232 UG/DL (ref 112–347)
VIT B12 SERPL-MCNC: 248 PG/ML (ref 232–1245)

## 2024-04-18 PROCEDURE — 6370000000 HC RX 637 (ALT 250 FOR IP): Performed by: NURSE PRACTITIONER

## 2024-04-18 PROCEDURE — 82607 VITAMIN B-12: CPT

## 2024-04-18 PROCEDURE — 84443 ASSAY THYROID STIM HORMONE: CPT

## 2024-04-18 PROCEDURE — 83550 IRON BINDING TEST: CPT

## 2024-04-18 PROCEDURE — 97162 PT EVAL MOD COMPLEX 30 MIN: CPT

## 2024-04-18 PROCEDURE — 82746 ASSAY OF FOLIC ACID SERUM: CPT

## 2024-04-18 PROCEDURE — 6370000000 HC RX 637 (ALT 250 FOR IP): Performed by: PSYCHIATRY & NEUROLOGY

## 2024-04-18 PROCEDURE — 80061 LIPID PANEL: CPT

## 2024-04-18 PROCEDURE — 82728 ASSAY OF FERRITIN: CPT

## 2024-04-18 PROCEDURE — 97165 OT EVAL LOW COMPLEX 30 MIN: CPT

## 2024-04-18 PROCEDURE — 97110 THERAPEUTIC EXERCISES: CPT

## 2024-04-18 PROCEDURE — 6370000000 HC RX 637 (ALT 250 FOR IP): Performed by: INTERNAL MEDICINE

## 2024-04-18 PROCEDURE — APPSS30 APP SPLIT SHARED TIME 16-30 MINUTES: Performed by: NURSE PRACTITIONER

## 2024-04-18 PROCEDURE — 99232 SBSQ HOSP IP/OBS MODERATE 35: CPT | Performed by: INTERNAL MEDICINE

## 2024-04-18 PROCEDURE — 83540 ASSAY OF IRON: CPT

## 2024-04-18 PROCEDURE — 83036 HEMOGLOBIN GLYCOSYLATED A1C: CPT

## 2024-04-18 PROCEDURE — 36415 COLL VENOUS BLD VENIPUNCTURE: CPT

## 2024-04-18 PROCEDURE — 1240000000 HC EMOTIONAL WELLNESS R&B

## 2024-04-18 PROCEDURE — 86361 T CELL ABSOLUTE COUNT: CPT

## 2024-04-18 PROCEDURE — 99232 SBSQ HOSP IP/OBS MODERATE 35: CPT | Performed by: PSYCHIATRY & NEUROLOGY

## 2024-04-18 RX ORDER — FLUOXETINE HYDROCHLORIDE 20 MG/1
20 CAPSULE ORAL DAILY
Status: DISCONTINUED | OUTPATIENT
Start: 2024-04-19 | End: 2024-04-20 | Stop reason: HOSPADM

## 2024-04-18 RX ORDER — LANOLIN ALCOHOL/MO/W.PET/CERES
1000 CREAM (GRAM) TOPICAL DAILY
Status: DISCONTINUED | OUTPATIENT
Start: 2024-04-18 | End: 2024-04-20 | Stop reason: HOSPADM

## 2024-04-18 RX ADMIN — TRIAMCINOLONE ACETONIDE: 1 CREAM TOPICAL at 21:08

## 2024-04-18 RX ADMIN — AMLODIPINE BESYLATE 5 MG: 5 TABLET ORAL at 08:53

## 2024-04-18 RX ADMIN — TRIAMCINOLONE ACETONIDE: 1 CREAM TOPICAL at 08:54

## 2024-04-18 RX ADMIN — ATORVASTATIN CALCIUM 20 MG: 20 TABLET, FILM COATED ORAL at 08:53

## 2024-04-18 RX ADMIN — FLUOXETINE 10 MG: 10 CAPSULE ORAL at 08:53

## 2024-04-18 RX ADMIN — HYDROXYZINE HYDROCHLORIDE 50 MG: 50 TABLET, FILM COATED ORAL at 21:06

## 2024-04-18 RX ADMIN — NAPROXEN 500 MG: 500 TABLET ORAL at 17:13

## 2024-04-18 RX ADMIN — OLANZAPINE 15 MG: 15 TABLET, FILM COATED ORAL at 21:06

## 2024-04-18 RX ADMIN — NAPROXEN 500 MG: 500 TABLET ORAL at 08:53

## 2024-04-18 RX ADMIN — CYANOCOBALAMIN TAB 1000 MCG 1000 MCG: 1000 TAB at 17:13

## 2024-04-18 RX ADMIN — HYDROXYZINE HYDROCHLORIDE 50 MG: 50 TABLET, FILM COATED ORAL at 08:56

## 2024-04-18 RX ADMIN — TRAZODONE HYDROCHLORIDE 50 MG: 50 TABLET ORAL at 23:07

## 2024-04-18 ASSESSMENT — PAIN - FUNCTIONAL ASSESSMENT
PAIN_FUNCTIONAL_ASSESSMENT: ACTIVITIES ARE NOT PREVENTED
PAIN_FUNCTIONAL_ASSESSMENT: ACTIVITIES ARE NOT PREVENTED

## 2024-04-18 ASSESSMENT — PAIN DESCRIPTION - DESCRIPTORS: DESCRIPTORS: ACHING

## 2024-04-18 ASSESSMENT — PAIN SCALES - GENERAL
PAINLEVEL_OUTOF10: 5
PAINLEVEL_OUTOF10: 7
PAINLEVEL_OUTOF10: 0
PAINLEVEL_OUTOF10: 5
PAINLEVEL_OUTOF10: 2

## 2024-04-18 ASSESSMENT — PAIN DESCRIPTION - LOCATION
LOCATION: ANKLE
LOCATION: ANKLE

## 2024-04-18 NOTE — PROGRESS NOTES
Physical Therapy  Facility/Department: [facility]  Physical Therapy Initial Assessment    Name: Gary Jones  : 1985  MRN: [id number]  Date of Service: 2024    Discharge Recommendations:   (HEP)   PT Equipment Recommendations  Equipment Needed: No      Patient Diagnosis(es): The primary encounter diagnosis was Suicidal ideation. A diagnosis of Visual hallucination was also pertinent to this visit.  Past Medical History:  has a past medical history of Depression with suicidal ideation and HIV disease (HCC).  Past Surgical History:  has a past surgical history that includes pr open treatment palatal/maxillary fracture (N/A, 2018); other surgical history (2017); pr office/outpt visit,procedure only (N/A, 2018); and Mouth surgery (Bilateral).    Assessment   Assessment: pt to continue w/ HEP for 10 reps 3 x daily.  Pt verbalizes understanding  Treatment Diagnosis: S/P left ankle ORIF  Therapy Prognosis: Excellent  Decision Making: Medium Complexity  History: pt admitted due to suicidal ideation  Exam: ROM, MMT, balance and mobility assessments  Clinical Presentation: pt completed HEP for general LE strengthening- given written HEP. Pt is independent in bed mobility, transfers, W/C mobility and gait 15' w/ left cam boot  Requires PT Follow-Up: No  Activity Tolerance  Activity Tolerance: Patient tolerated treatment well     Plan   Physical Therapy Plan  General Plan: Discharge with evaluation only  Safety Devices  Type of Devices: Nurse notified, Left in bed, Gait belt (pt left in common area)  Restraints  Restraints Initially in Place: No     Restrictions  Restrictions/Precautions  Restrictions/Precautions: Fall Risk, General Precautions (cam boot left ankle)  Required Braces or Orthoses?: Yes  Implants present? : Metal implants (metal in jaw and Left ankle)  Required Braces or Orthoses  Left Lower Extremity Brace: Boot (don boot prior to functional mobility/transfers)     Subjective   Pain: pt

## 2024-04-18 NOTE — GROUP NOTE
Psych-Ed/Relapse Prevention Group Note        Date: April 18, 2024 Start Time: 1:30pm  End Time:  2:30pm      Number of Participants in Group & Unit Census:  8/16    Topic: Coping skills and relaxation    Goal of Group:Patient will identify benefits of music for coping and relaxation      Comments:     Patient did not participate in Psych-Ed/Relapse Prevention group, despite staff encouragement and explanation of benefits.  Patient remain seclusive to self.  Q15 minute safety checks maintained for patient safety and will continue to encourage patient to attend unit programming.         Signature:  Giulia Cunningham, CTRS

## 2024-04-18 NOTE — PROGRESS NOTES
Occupational Therapy  [facility]   Occupational Therapy Evaluation  Date: 24  Patient Name: Gary Jones       Room: 0120/0120-01  MRN: [id number]  Account: [id number]   : 1985  (39 y.o.) Gender: male     Discharge Recommendations:  The patient's needs are being met with no further Occupational Therapy recommended at discharge.     OT Equipment Recommendations  Equipment Needed: No    Referring Practitioner: Emery Hardy MD  Diagnosis: Major depressive disorder, recurrent, severe with psychotic features   Additional Pertinent Hx: Per chart: Gary Jones is a 39 y.o. male who presents with brought in by the [facility].  [facility] did not pink slip him.  Apparently he had been going there for the past week or so the plan was originally to admit him there.  However patient stated that he had some things to take care of and so he was not initially admitted in the left and then came back and then they brought him here.  He is unable to be admitted [facility] because he has a history of being a sex offender.  Here, patient complaining of suicidal ideation and also visual hallucinations.  States that he has always had visual hallucinations.  States that he has been having worsening thoughts about killing himself, plan to cut his wrists         Past Medical History:  has a past medical history of Depression with suicidal ideation and HIV disease (HCC).    Past Surgical History:   has a past surgical history that includes pr open treatment palatal/maxillary fracture (N/A, 2018); other surgical history (2017); pr office/outpt visit,procedure only (N/A, 2018); and Mouth surgery (Bilateral).    Restrictions  Restrictions/Precautions  Restrictions/Precautions: Fall Risk, General Precautions  Required Braces or Orthoses?: Yes  Implants present? : Metal implants (metal in jaw and LLE)  Required Braces or Orthoses  Left Lower Extremity Brace: Boot (don boot prior to  hygiene  UE Bathing: Independent  LE Bathing: Independent  UE Dressing: Independent  LE Dressing: Independent  LE Dressing Skilled Clinical Factors: pt dons LLE boot prior to standing/mobility without difficulty  Toileting: Independent  Additional Comments: ADL scores based on skilled observation and clinical reasoning unless otherwise noted. Pt is functioning at baseline level, and is not currently limited by any performance deficits impacting self care ability.         UE Function  LUE AROM (degrees)  LUE AROM : WFL  Left Hand AROM (degrees)  Left Hand AROM: WFL  Tone LUE  LUE Tone: Normotonic  LUE Strength  Gross LUE Strength: WFL  L Hand General: 5/5  LUE Strength Comment: grossly 5/5    RUE AROM (degrees)  RUE AROM : WFL  Right Hand AROM (degrees)  Right Hand AROM: WFL  Tone RUE  RUE Tone: Normotonic  RUE Strength  Gross RUE Strength: WFL  R Hand General: 5/5  RUE Strength Comment: grossly 5/5         Fine Motor Skills/Coordination  Coordination  Movements Are Fluid And Coordinated: Yes     Bed mobility  Bed Mobility Comments: unable to assess as pt seated in w/c upon OT entry and retired in w/c upon exit    Balance  Sitting Balance: Independent (supported/unsupported sitting in w/c without loss of balance)  Standing Balance: Independent  Standing Balance  Time: 3-4 minutes  Activity: functional transfers, functional mobility, oral hygiene standing at sink  Comment: without use of device      Transfers  Sit to stand: Independent  Stand to sit: Independent  Transfer Comments: without use of device    Functional Mobility  Functional - Mobility Device: No device  Activity: Other (to/from room/bathroom and in common area to simulate household distances)  Assist Level: Independent  Functional Mobility Comments: boot donned prior to mobility, no loss of balance or safety concerns, pt reports feeling at baseline with mobility    Assessment  Assessment  Assessment: Pt is currently functioning at baseline level without

## 2024-04-18 NOTE — PROGRESS NOTES
IN-PATIENT SERVICE  [facility]    HISTORY AND PHYSICAL EXAMINATION/ CONSULT NOTE            Date:   4/18/2024  Patient name:  Gary Jones  Date of admission:  4/16/2024 11:43 AM  MRN:   [id number]  Account:  [id number]  YOB: 1985  PCP:    No primary care provider on file.  Room:   [address] [place] [address]  Code Status:    Full Code    Physician Requesting Consult: Emery Hardy MD    Reason for Consult: History and physical, medical management    Chief Complaint:     Chief Complaint   Patient presents with    Mental Health Problem     Medical management    History Obtained From:     Patient, EMR, nursing staff    History of Present Illness:     39-year-old male admitted for depression with psychotic features    Medical history relevant for HIV patient reports he has been compliant with medication and is following with [facility]  Also has history of hypertension, hyperlipidemia    HTN  Onset more than 2 years ago  Mini: mild to mod  Usually controlled with current po meds  Not associated with headaches or blurry vision  No chest pain      Past Medical History:     Past Medical History:   Diagnosis Date    Depression with suicidal ideation 4/16/2024    HIV disease (HCC)         Past Surgical History:     Past Surgical History:   Procedure Laterality Date    MOUTH SURGERY Bilateral     OTHER SURGICAL HISTORY  04/27/2017    extubatio in OR    AK OFFICE/OUTPT VISIT,PROCEDURE ONLY N/A 4/27/2018    EXTUBATION OF ARTIFICIAL AIRWAY performed by Kiana Camarena MD at [facility] OR    AK OPEN TREATMENT PALATAL/MAXILLARY FRACTURE N/A 4/25/2018    ORIF SYMPHYSIS, CLOSED REDUCTION LEFT RAMUS performed by Eliseo Barrow DDS at [facility] OR        Medications Prior to Admission:     Prior to Admission medications    Medication Sig Start Date End Date Taking? Authorizing Provider   abacavir-dolutegravir-lamiVUDine (TRIUMEQ) 600- MG TABS Take 1 tablet by mouth daily   Yes Provider, MD aRad

## 2024-04-18 NOTE — GROUP NOTE
Group Therapy Note    Date: 4/18/2024    Group Start Time: 1000  Group End Time: 1048  Group Topic: Psychoeducation    [facility] Brittnee Velasquez LISW-S        Group Therapy Note    Attendees: 6/18       Patient was offered group therapy today but declined to participate despite encouragement from staff.  1:1 was offered.      Signature:  JAYANT Ridley

## 2024-04-18 NOTE — PLAN OF CARE
[facility]  Day 3 Interdisciplinary Treatment Plan NOTE    Review Date & Time: 4/18/2024   1245    Admission Type:   Admission Type: Voluntary    Reason for admission:  Reason for Admission: Patient reported to outpatient doctor that he had fleeting suicidal ideations. Doctor reffered patient to [facility] for evaluation. Due to Patient's status as a sex offender, [facility] brought patient to [facility] ED for evaluation. In ED patient reported fleeting SI to cut self, deprssion and audtiory command hallucinations to kill self. Patient reported to ER staff he tried to cut self three weeks ago but did not seek treatment.  Estimated Length of Stay: 5-7 days  Estimated Discharge Date Update: to be determined by physician    PATIENT STRENGTHS:  Patient Strengths    Patient Strengths and Limitations:Limitations: External locus of control, Multiple barriers to leisure interests, Difficulty problem solving/relies on others to help solve problems, Inappropriate/potentially harmful leisure interests, Apathetic / unmotivated, General negative or hopeless attitude about future/recovery, Tendency to isolate self, Difficult relationships / poor social skills  Addictive Behavior:Addictive Behavior  In the Past 3 Months, Have You Felt or Has Someone Told You That You Have a Problem With  : None  Medical Problems:  Past Medical History:   Diagnosis Date    Depression with suicidal ideation 4/16/2024    HIV disease (HCC)        Risk:  Fall Risk   Christ Scale Christ Scale Score: 21  BVC    Change in scores no Changes to plan of Care no    Status EXAM:   Mental Status and Behavioral Exam  Normal: No  Level of Assistance: Independent/Self  Facial Expression: Flat  Affect: Blunt  Level of Consciousness: Alert  Frequency of Checks: 4 times per hour, close  Mood:Normal: No  Mood: Depressed, Anxious  Motor Activity:Normal: No  Motor Activity: Decreased  Eye Contact: Fair  Observed Behavior: Withdrawn, Guarded  Sexual

## 2024-04-18 NOTE — GROUP NOTE
Group Therapy Note    Date: 4/18/2024    Group Start Time: 0900  Group End Time: 0930  Group Topic: Group Documentation    Kt Bloom        Group Therapy Note    Attendees: 7/18           Patient's Goal:  try to have a good day    Notes:  Morning goal group session, patient has opportunity to reflect on what they need to accomplish to achieve discharge, and decide on a step towards that that they would like to accomplish by the end of the day today.     Status After Intervention:  Improved    Participation Level: Active Listener and Interactive    Participation Quality: Appropriate, Attentive, and Sharing      Speech:  normal      Thought Process/Content: Logical  Linear      Affective Functioning: Congruent      Mood: euthymic      Level of consciousness:  Alert, Oriented x4, and Attentive      Response to Learning: Capable of insight and Able to change behavior      Endings: None Reported    Modes of Intervention: Support and Socialization      Discipline Responsible: Behavorial Health Tech      Signature:  Kt Jerry

## 2024-04-18 NOTE — PROGRESS NOTES
500 mg, Oral, BID WC  OLANZapine (ZYPREXA) tablet 15 mg, 15 mg, Oral, Nightly  triamcinolone (KENALOG) 0.1 % cream, , Topical, BID    ASSESSMENT  Major depressive disorder, recurrent, severe with psychotic features (HCC)         PATIENT HANDOFF  Patient symptoms are: Modestly improving  Monitor need and frequency of PRN medications.  Encourage participation in groups and milieu.  Medication changes and discharge planning per attending  Follow-up daily while inpatient.     Patient continues to be monitored in the inpatient psychiatric facility at [facility] for safety and stabilization. Patient continues to need, on a daily basis, active treatment furnished directly by or requiring the supervision of inpatient psychiatric personnel.    Electronically signed by ZIGGY Savage CNP on 4/18/2024 at 3:15 PM    **This report has been created using voice recognition software. It may contain minor errors which are inherent in voice recognition technology.**     I independently saw and evaluated the patient.  I reviewed the nurse practitioners documentation above.  Principle diagnosis we are treating for is Major depressive disorder, recurrent, severe with psychotic features (HCC). Any additional comments or changes to the nurse practitioners documentation are stated below otherwise agree with assessment.  Plan will be as follows:  Patient denying side effects to medication.  Reporting some modest improvement in symptoms.  Not yet able to contract for safety outside of the hospital but does note improvement.  We discussed with continued improvement in her stability through tomorrow may consider discharge as early as Saturday and patient is in agreement.  After discussion of risk benefits and alternatives patient is agreeable to increase Prozac to 20 mg daily  PLAN  Patient s symptoms   are improving  Increase Prozac to 20 mg daily  Attempt to develop insight  Psycho-education conducted.  Supportive Therapy  conducted.  Probable discharge is possibly as early as Saturday if continued improvement is achieved  Follow-up daily while on inpatient unit

## 2024-04-18 NOTE — GROUP NOTE
Group Therapy Note    Date: 4/18/2024    Group Start Time: 1100  Group End Time: 1145  Group Topic: Psychoeducation    [facility]    Giulia Cunningham CTRS    Group Therapy Note    Attendees: 10/17     Patient's Goal:  Patient will identify benefits of mental health education and advocacy to decrease stigma and improve access to care    Notes:  Patient attended group and participated    Status After Intervention:  Improved    Participation Level: Active Listener    Participation Quality: Appropriate, Attentive      Speech:  normal      Thought Process/Content: Logical  Linear      Affective Functioning: blunted      Mood: euthymic      Level of consciousness:  Alert, Oriented x4      Response to Learning: Able to verbalize current knowledge/experience, Able to verbalize/acknowledge new learning, Able to retain information    Endings: None Reported    Modes of Intervention: Education, Support, Socialization, and Exploration      Discipline Responsible: Psychoeducational Specialist      Signature:  GRACE Contreras

## 2024-04-18 NOTE — PLAN OF CARE
Problem: Self Harm/Suicidality  Goal: Will have no self-injury during hospital stay  Description: INTERVENTIONS:  1.  Ensure constant observer at bedside with Q15M safety checks  2.  Maintain a safe environment  3.  Secure patient belongings  4.  Ensure family/visitors adhere to safety recommendations  5.  Ensure safety tray has been added to patient's diet order  6.  Every shift and PRN: Re-assess suicidal risk via Frequent Screener    4/18/2024 1522 by Kt Jerry  Outcome: Progressing     Problem: Depression  Goal: Will be euthymic at discharge  Description: INTERVENTIONS:  1. Administer medication as ordered  2. Provide emotional support via 1:1 interaction with staff  3. Encourage involvement in milieu/groups/activities  4. Monitor for social isolation  4/18/2024 1522 by Kt Jerry  Outcome: Progressing   Patient was cooperative with daily assessment. Patient is alert and oriented to person, place, time, and situation. Patient remains behaviorally controlled and acts appropriately on the unit. Patient is able to communicate his thoughts and feelings with staff.    Based on patient's responses, his thoughts are impacted by preoccupations. Patient appears flat, congruent with his stated thoughts and feelings. Patient does not appear to be suffering from a self-care deficit at this time, and is dressed appropriately.     Patient denies experiencing any thoughts of self harm or suicidality.   Patient does not present with any self-injurious behavior at this time. Patient agrees to not self-harm, and to notify staff if any thoughts of wanting to do so arise.   Patient relates that he is experiencing moderate feelings of depression that are improving. Patient has been working with his physical and occupational therapy team today.  Patient is compliant with all vitals checks and medications.    Patient has been attending group therapy sessions and unit programming, and has been participating in

## 2024-04-18 NOTE — PLAN OF CARE
Problem: Self Harm/Suicidality  Goal: Will have no self-injury during hospital stay  Description: INTERVENTIONS:  1.  Ensure constant observer at bedside with Q15M safety checks  2.  Maintain a safe environment  3.  Secure patient belongings  4.  Ensure family/visitors adhere to safety recommendations  5.  Ensure safety tray has been added to patient's diet order  6.  Every shift and PRN: Re-assess suicidal risk via Frequent Screener    4/17/2024 2314 by Mary Rich, RN  Outcome: Progressing     Problem: Depression  Goal: Will be euthymic at discharge  Description: INTERVENTIONS:  1. Administer medication as ordered  2. Provide emotional support via 1:1 interaction with staff  3. Encourage involvement in milieu/groups/activities  4. Monitor for social isolation  4/17/2024 2314 by Mary Rich, RN  Outcome: Progressing    The patient has been out in the day room watching TV, aloof of peers. He is on a 1:1 with staff for safety precautions as he has a wheel chair and a boot from prior surgery. He denies current thoughts of self harm. He reports endorsing in some anxious thoughts and requested PRN Atarax and Trazodone for sleep. Atarax 50 mg and Trazodone 50 mg administered as prescribed. Writer will assess the patient in an hour for efficacy of the medications. Writer will continue to offer emotional support. Q15 minute checks to continue.

## 2024-04-19 LAB
DATE, STOOL #1: NORMAL
HEMOCCULT SP1 STL QL: NEGATIVE
TIME, STOOL #1: 0

## 2024-04-19 PROCEDURE — 6370000000 HC RX 637 (ALT 250 FOR IP): Performed by: PSYCHIATRY & NEUROLOGY

## 2024-04-19 PROCEDURE — APPSS30 APP SPLIT SHARED TIME 16-30 MINUTES

## 2024-04-19 PROCEDURE — 6370000000 HC RX 637 (ALT 250 FOR IP): Performed by: INTERNAL MEDICINE

## 2024-04-19 PROCEDURE — 99232 SBSQ HOSP IP/OBS MODERATE 35: CPT | Performed by: PSYCHIATRY & NEUROLOGY

## 2024-04-19 PROCEDURE — 82272 OCCULT BLD FECES 1-3 TESTS: CPT

## 2024-04-19 PROCEDURE — 6370000000 HC RX 637 (ALT 250 FOR IP): Performed by: NURSE PRACTITIONER

## 2024-04-19 PROCEDURE — 1240000000 HC EMOTIONAL WELLNESS R&B

## 2024-04-19 PROCEDURE — 90833 PSYTX W PT W E/M 30 MIN: CPT | Performed by: PSYCHIATRY & NEUROLOGY

## 2024-04-19 RX ORDER — OLANZAPINE 15 MG/1
15 TABLET ORAL NIGHTLY
Qty: 30 TABLET | Refills: 0 | Status: SHIPPED | OUTPATIENT
Start: 2024-04-19

## 2024-04-19 RX ORDER — HYDROXYZINE 50 MG/1
50 TABLET, FILM COATED ORAL 3 TIMES DAILY PRN
Qty: 30 TABLET | Refills: 0 | Status: SHIPPED | OUTPATIENT
Start: 2024-04-19 | End: 2024-04-29

## 2024-04-19 RX ORDER — ATORVASTATIN CALCIUM 20 MG/1
20 TABLET, FILM COATED ORAL DAILY
Qty: 30 TABLET | Refills: 0 | Status: SHIPPED | OUTPATIENT
Start: 2024-04-19

## 2024-04-19 RX ORDER — TRAZODONE HYDROCHLORIDE 50 MG/1
50 TABLET ORAL NIGHTLY PRN
Qty: 30 TABLET | Refills: 0 | Status: SHIPPED | OUTPATIENT
Start: 2024-04-19

## 2024-04-19 RX ORDER — FLUOXETINE HYDROCHLORIDE 20 MG/1
20 CAPSULE ORAL DAILY
Qty: 30 CAPSULE | Refills: 3 | Status: SHIPPED | OUTPATIENT
Start: 2024-04-20

## 2024-04-19 RX ORDER — TRIAMCINOLONE ACETONIDE 1 MG/G
CREAM TOPICAL 2 TIMES DAILY
Qty: 45 G | Refills: 0 | Status: SHIPPED | OUTPATIENT
Start: 2024-04-19

## 2024-04-19 RX ORDER — AMLODIPINE BESYLATE 5 MG/1
5 TABLET ORAL DAILY
Qty: 30 TABLET | Refills: 0 | Status: SHIPPED | OUTPATIENT
Start: 2024-04-19

## 2024-04-19 RX ORDER — ABACAVIR SULFATE, DOLUTEGRAVIR SODIUM, LAMIVUDINE 600; 50; 300 MG/1; MG/1; MG/1
1 TABLET, FILM COATED ORAL DAILY
Qty: 30 TABLET | Refills: 0 | Status: SHIPPED | OUTPATIENT
Start: 2024-04-20

## 2024-04-19 RX ADMIN — FLUOXETINE HYDROCHLORIDE 20 MG: 20 CAPSULE ORAL at 08:49

## 2024-04-19 RX ADMIN — NAPROXEN 500 MG: 500 TABLET ORAL at 18:00

## 2024-04-19 RX ADMIN — CYANOCOBALAMIN TAB 1000 MCG 1000 MCG: 1000 TAB at 08:48

## 2024-04-19 RX ADMIN — TRAZODONE HYDROCHLORIDE 50 MG: 50 TABLET ORAL at 22:27

## 2024-04-19 RX ADMIN — AMLODIPINE BESYLATE 5 MG: 5 TABLET ORAL at 08:48

## 2024-04-19 RX ADMIN — HYDROXYZINE HYDROCHLORIDE 50 MG: 50 TABLET, FILM COATED ORAL at 08:49

## 2024-04-19 RX ADMIN — OLANZAPINE 15 MG: 15 TABLET, FILM COATED ORAL at 22:27

## 2024-04-19 RX ADMIN — NAPROXEN 500 MG: 500 TABLET ORAL at 08:48

## 2024-04-19 RX ADMIN — ATORVASTATIN CALCIUM 20 MG: 20 TABLET, FILM COATED ORAL at 08:48

## 2024-04-19 RX ADMIN — HYDROXYZINE HYDROCHLORIDE 50 MG: 50 TABLET, FILM COATED ORAL at 22:27

## 2024-04-19 RX ADMIN — TRIAMCINOLONE ACETONIDE: 1 CREAM TOPICAL at 08:50

## 2024-04-19 ASSESSMENT — LIFESTYLE VARIABLES
HOW OFTEN DO YOU HAVE A DRINK CONTAINING ALCOHOL: MONTHLY OR LESS
HOW MANY STANDARD DRINKS CONTAINING ALCOHOL DO YOU HAVE ON A TYPICAL DAY: 1 OR 2

## 2024-04-19 ASSESSMENT — PAIN DESCRIPTION - LOCATION: LOCATION: GENERALIZED

## 2024-04-19 ASSESSMENT — PAIN SCALES - GENERAL: PAINLEVEL_OUTOF10: 4

## 2024-04-19 ASSESSMENT — PAIN DESCRIPTION - DESCRIPTORS: DESCRIPTORS: ACHING

## 2024-04-19 ASSESSMENT — PAIN - FUNCTIONAL ASSESSMENT: PAIN_FUNCTIONAL_ASSESSMENT: ACTIVITIES ARE NOT PREVENTED

## 2024-04-19 NOTE — PROGRESS NOTES
CLINICAL PHARMACY NOTE: MEDS TO BEDS    Total # of Prescriptions Filled: 3   The following medications were delivered to the patient:  Hydroxyzine 50mg  Fluoxetine 20mg  Trazodone 50mg    Additional Documentation:  Delivered medications to Samir  nurses station

## 2024-04-19 NOTE — GROUP NOTE
Group Therapy Note    Date: 4/19/2024    Group Start Time: 1100  Group End Time: 1145  Group Topic: Psychoeducation    [facility]    Giulia Cunningham CTRS    Group Therapy Note    Attendees: 9/16     Patient's Goal:  Patient will identify benefits of music for coping and stress management    Notes:  Patient attended group and participated    Status After Intervention:  Improved    Participation Level: Active Listener and Interactive    Participation Quality: Appropriate, Attentive      Speech:  normal      Thought Process/Content: Logical  Linear      Affective Functioning: Blunted      Mood: Depressed      Level of consciousness:  Alert, Oriented x4, and Attentive      Response to Learning: Able to verbalize current knowledge/experience, Able to verbalize/acknowledge new learning, Capable of insight, Able to change behavior, and Progressing to goal      Endings: None Reported    Modes of Intervention: Education, Support, Socialization, and Exploration      Discipline Responsible: Psychoeducational Specialist      Signature:  GRACE Contreras

## 2024-04-19 NOTE — PROGRESS NOTES
Daily Progress Note  4/19/2024    Patient Name: Gary Jones    CHIEF COMPLAINT:  Depression with suicidal ideation; command auditory hallucinations          SUBJECTIVE:      Patient is seen today for a follow up assessment. Vitals and labs reviewed and appear to be within normal limits.  Gary is compliant with scheduled medications. He remains behaviorally in control and has not required emergency medications in the past 24 hours. Gary is agreeable to assessment in unit sensory room today.  He is in a wheelchair due to a \"twisted ankle.\"  When asked about events leading up to hospitalization, Gary reports he had  been feeling increasingly depressed and suicidal lately.  He reports today continued depression and anxiety however notes slight improvement in his mood.  He reports that his sleep continues to be poor as he will wake up early, such as around  4 AM, and be unable to fall back asleep.  When asked about racing thoughts he states, \"that might be going on.\"  He denies issues with his appetite.    Gary continues to endorse fleeting suicidal ideation stating the thoughts continue to come and go throughout the day. He denies homicidal ideation. He reports that since being in the hospital he has not noticed any auditory or visual hallucinations however states this was happening prior to him coming to the hospital.  He denies any side effects to medications at this time.     Appetite:  [x] Normal/Adequate/Unchanged  [] Increased  [] Decreased      Sleep:       [] Normal/Adequate/Unchanged  [] Fair  [x] Poor      Group Attendance on Unit:   [] Yes  [x] Selectively    [] No    Medication Side Effects:  Patient denies any medication side effects at the time of assessment.         Mental Status Exam  Level of consciousness: Alert and awake.   Appearance: Appropriate attire for setting, seated in wheelchair, with fair  grooming and hygiene.   Behavior/Motor: Approachable, calm  Attitude toward examiner:  PRN  abacavir-dolutegravir-lamiVUDine (TRIUMEQ) 600- MG TABS 600 mg (Patient Supplied), 1 tablet, Oral, Daily  amLODIPine (NORVASC) tablet 5 mg, 5 mg, Oral, Daily  atorvastatin (LIPITOR) tablet 20 mg, 20 mg, Oral, Daily  naproxen (NAPROSYN) tablet 500 mg, 500 mg, Oral, BID WC  OLANZapine (ZYPREXA) tablet 15 mg, 15 mg, Oral, Nightly  triamcinolone (KENALOG) 0.1 % cream, , Topical, BID    ASSESSMENT  Major depressive disorder, recurrent, severe with psychotic features (HCC)         HANDOFF  Patient symptoms are: Remains Unstable.  Medications as determined by attending physician  Monitor need and frequency of PRN medications.  Encourage participation in groups and milieu.  Probable discharge is to be determined by MD.     Electronically signed by ZIGGY Stinson CNP on 4/19/2024 at 10:57 AM    **This report has been created using voice recognition software. It may contain minor errors which are inherent in voice recognition technology.**    I independently saw and evaluated the patient.  I reviewed the nurse practitioners documentation above.  Principle diagnosis we are treating for is Major depressive disorder, recurrent, severe with psychotic features (HCC).  Any additional comments or changes to the nurse practitioners documentation are stated below otherwise agree with assessment.  Plan will be as follows:  Spent 17 minutes with the patient in supportive psychotherapy.  Patient denying side effects to medication.  Reporting improvement in mood.  Denying suicidal or homicidal ideation intent or plan.  Denying psychotic symptoms.  Forward-looking and constructive.  Discussed if continued stability or improvement through tomorrow would consider discharge and patient is in agreement.  Documentation is for date of service April 19, 2024    PLAN  Patient s symptoms   are improving  Continue with current medication for now  Attempt to develop insight  Psycho-education conducted.  Supportive Therapy

## 2024-04-19 NOTE — PLAN OF CARE
Problem: Self Harm/Suicidality  Goal: Will have no self-injury during hospital stay  Description: INTERVENTIONS:  1.  Ensure constant observer at bedside with Q15M safety checks  2.  Maintain a safe environment  3.  Secure patient belongings  4.  Ensure family/visitors adhere to safety recommendations  5.  Ensure safety tray has been added to patient's diet order  6.  Every shift and PRN: Re-assess suicidal risk via Frequent Screener    4/19/2024 0517 by Elizabet Hu LPN  Outcome: Progressing    Problem: Depression  Goal: Will be euthymic at discharge  Description: INTERVENTIONS:  1. Administer medication as ordered  2. Provide emotional support via 1:1 interaction with staff  3. Encourage involvement in milieu/groups/activities  4. Monitor for social isolation  4/19/2024 0517 by Elizabet Hu LPN  Outcome: Progressing    Problem: Pain  Goal: Verbalizes/displays adequate comfort level or baseline comfort level  Outcome: Progressing    Note: Patient denies suicidal or homicidal ideations. Denies depression and anxiety. Social with peers in dayroom, attended groups. Med compliant. Patient educated on alerting staff if trying to harm self or others. Safety check every 15 min.

## 2024-04-19 NOTE — PLAN OF CARE
Problem: Self Harm/Suicidality  Goal: Will have no self-injury during hospital stay  Description: INTERVENTIONS:  1.  Ensure constant observer at bedside with Q15M safety checks  2.  Maintain a safe environment  3.  Secure patient belongings  4.  Ensure family/visitors adhere to safety recommendations  5.  Ensure safety tray has been added to patient's diet order  6.  Every shift and PRN: Re-assess suicidal risk via Frequent Screener    4/19/2024 1325 by Donna Andino LPN  Note: Patient denies suicidal/homicidal ideations but reports having some anxiety and depression that is improving with coping skills obtained and programming. Patient is thought blocked, aloof from others with fair eye contact. Patient is medication compliant with no behaviors.     Problem: Depression  Goal: Will be euthymic at discharge  Description: INTERVENTIONS:  1. Administer medication as ordered  2. Provide emotional support via 1:1 interaction with staff  3. Encourage involvement in milieu/groups/activities  4. Monitor for social isolation  4/19/2024 1325 by Donna Andino LPN  Note: Patient is progressing towards recommended goal.     Problem: Pain  Goal: Verbalizes/displays adequate comfort level or baseline comfort level  4/19/2024 1325 by Donna Andino LPN  Note: Pain management controlled with with routine pain medication, massage and repositioning.

## 2024-04-19 NOTE — DISCHARGE INSTRUCTIONS
start to feel suicidal.  List your personal coping strategies.   What can you do or think about to avoid acting when you feel suicidal? This may include your reason(s) to live. Rank these ideas by how well you think they'll work. What might keep you from using them? What might make you more likely to use them?  Come up with some sources of support and distraction.   Think of people and places that could help shift your attention away from painful feelings or thoughts of dying. This may include children you care about or a safe social space, like a coffee shop or a bookstore.  Make a list of people you can count on for help.   Think about who you could contact in a crisis. Who do you trust and confide in? Who is always there when you need them? This might be a friend, a family member, or someone else, like a caregiver or . If no one comes to mind, that's okay. Be sure you have some professional support, such as a doctor or counselor.  List your professional sources of support.   This may include your doctor or therapist, local emergency rooms, and local crisis hotlines.  Think through ways to keep yourself safe.   Do you have weapons or other means to hurt yourself? Consider how to limit your access to them. For example, if you have a gun, maybe you could ask a friend or family member to lock it up for you.  When should you call for help?   Call 911 anytime you think you may need emergency care. For example, call if:    You feel you cannot stop from hurting yourself or someone else.   Where to get help 24 hours a day, 7 days a week   If you or someone you know talks about suicide, self-harm, a mental health crisis, a substance use crisis, or any other kind of emotional distress, get help right away. You can:    Call the Suicide and Crisis Lifeline at 658.     Call 3-492-399-TALK (1-636.521.5462).     Text HOME to 871475 to access the Crisis Text Line.   Consider saving these numbers in your phone.  Go to  [email or web address] for more information or to chat online.  Call your doctor now or seek immediate medical care if:    You have one or more warning signs of suicide. For example, call if:  You feel like giving away your possessions.  You use illegal drugs or drink alcohol heavily.  You talk or write about death. This may include writing suicide notes and talking about guns, knives, or pills.  You start to spend a lot of time alone or spend more time alone than usual.     You hear voices.     You start acting in an aggressive way that's not normal for you.   Watch closely for changes in your health, and be sure to contact your doctor if you have any problems.  Current as of: June 24, 2023               Content Version: 14.0  © 2006-2024 [employer].   Care instructions adapted under license by [employer]. If you have questions about a medical condition or this instruction, always ask your healthcare professional. [employer] disclaims any warranty or liability for your use of this information.

## 2024-04-19 NOTE — GROUP NOTE
Group Therapy Note    Date: 4/19/2024    Group Start Time: 1100  Group End Time: 1145  Group Topic: Psychoeducation    Josefina Sandhu, DOMENICAW        Group Therapy Note    Attendees: 6/15       patient refused to attend psychoeducation group at 11a after encouragement from staff.  1:1 talk time provided as alternative to group session

## 2024-04-20 VITALS
DIASTOLIC BLOOD PRESSURE: 89 MMHG | TEMPERATURE: 97.2 F | HEIGHT: 65 IN | RESPIRATION RATE: 14 BRPM | BODY MASS INDEX: 24.99 KG/M2 | WEIGHT: 150 LBS | SYSTOLIC BLOOD PRESSURE: 123 MMHG | OXYGEN SATURATION: 97 % | HEART RATE: 93 BPM

## 2024-04-20 LAB
CD3+CD4+ CELLS # BLD: 294 /UL (ref 309–1571)
CD3+CD4+ CELLS NFR BLD: 26 % (ref 27–64)
LYMPHOCYTES # BLD: 39 % (ref 24–44)
WBC # BLD: 2.9 K/UL (ref 3.5–11)

## 2024-04-20 PROCEDURE — 99239 HOSP IP/OBS DSCHRG MGMT >30: CPT | Performed by: PSYCHIATRY & NEUROLOGY

## 2024-04-20 PROCEDURE — 6370000000 HC RX 637 (ALT 250 FOR IP): Performed by: NURSE PRACTITIONER

## 2024-04-20 PROCEDURE — 6370000000 HC RX 637 (ALT 250 FOR IP): Performed by: PSYCHIATRY & NEUROLOGY

## 2024-04-20 PROCEDURE — 6370000000 HC RX 637 (ALT 250 FOR IP): Performed by: INTERNAL MEDICINE

## 2024-04-20 RX ADMIN — TRIAMCINOLONE ACETONIDE: 1 CREAM TOPICAL at 09:10

## 2024-04-20 RX ADMIN — AMLODIPINE BESYLATE 5 MG: 5 TABLET ORAL at 09:09

## 2024-04-20 RX ADMIN — NAPROXEN 500 MG: 500 TABLET ORAL at 09:09

## 2024-04-20 RX ADMIN — FLUOXETINE HYDROCHLORIDE 20 MG: 20 CAPSULE ORAL at 09:09

## 2024-04-20 RX ADMIN — ATORVASTATIN CALCIUM 20 MG: 20 TABLET, FILM COATED ORAL at 09:09

## 2024-04-20 RX ADMIN — CYANOCOBALAMIN TAB 1000 MCG 1000 MCG: 1000 TAB at 09:09

## 2024-04-20 ASSESSMENT — PAIN SCALES - GENERAL: PAINLEVEL_OUTOF10: 5

## 2024-04-20 NOTE — BH NOTE
[facility] [place]  Discharge Note    Pt discharged with followings belongings:   Dental Appliances: None  Vision - Corrective Lenses: None  Hearing Aid: None  Jewelry: None  Body Piercings Removed: N/A  Clothing: Pants, Shirt, Shorts, Footwear  Other Valuables: Other (Comment) (Medications, Multiple debit cards)   Valuables sent home with patient. Patient educated on aftercare instructions: Yes  Information faxed to [facility] by Staff  at 11:41 AM .Patient verbalize understanding of AVS:  YEs.    Status EXAM upon discharge:  Mental Status and Behavioral Exam  Normal: No  Level of Assistance: Independent/Self  Facial Expression: Worried  Affect: Blunt  Level of Consciousness: Alert  Frequency of Checks: 4 times per hour, close  Mood:Normal: No  Mood: Anxious, Depressed  Motor Activity:Normal: Yes  Motor Activity: Decreased  Eye Contact: Fair  Observed Behavior: Cooperative, Preoccupied  Sexual Misconduct History: Current - no  Preception: [place] to person, [place] to time, [place] to place, [place] to situation  Attention:Normal: No  Attention: Distractible  Thought Processes: Blocking  Thought Content:Normal: No  Thought Content: Preoccupations  Depression Symptoms: Change in energy level  Anxiety Symptoms: Generalized  Shanae Symptoms: No problems reported or observed.  Hallucinations: None  Delusions: No  Memory:Normal: Yes  Memory: Poor recent, Poor remote  Insight and Judgment: No  Insight and Judgment: Poor insight, Poor judgment    Tobacco Screening:  Practical Counseling, on admission, harriet X, if applicable and completed (first 3 are required if patient doesn't refuse):            ( x) Recognizing danger situations (included triggers and roadblocks)                    ( x) Coping skills (new ways to manage stress,relaxation techniques, changing routine, distraction)                                                           ( x) Basic information about quitting (benefits of quitting, techniques in how

## 2024-04-20 NOTE — BH NOTE
Patient given tobacco quitline number 79796525937 at this time, refusing to call at this time, states \" I just dont want to quit now\"- patient given information as to the dangers of long term tobacco use. Continue to reinforce the importance of tobacco cessation.

## 2024-04-20 NOTE — TRANSITION OF CARE
Behavioral Health Transition Record to Provider    Patient Name: Gary Jones  YOB: 1985   Medical Record Number: [id number]  Date of Admission: 4/16/2024 11:43 AM   Date of Discharge: 4/20/2024    Attending Provider: Emery Hardy MD   Discharging Provider: Dr. Hardy   To contact this individual call  and ask the  to page.  If unavailable, ask to be transferred to Behavioral Health Provider on call.  A Behavioral Health Provider will be available on call 24/7 and during holidays.    Primary Care Provider: No primary care provider on file.    No Known Allergies    Reason for Admission: Patient told outpatient  that he had suicidal ideations to cut self. Reports depression and auditory command hallucinations. Patient did state to ER staff that he tried to cut himself three weeks ago but did not receive treatment.     Admission Diagnosis: Suicidal ideation [R45.851]  Visual hallucination [R44.1]  Depression with suicidal ideation [F32.A, R45.851]    * No surgery found *    Results for orders placed or performed during the hospital encounter of 04/16/24   Comprehensive Metabolic Panel   Result Value Ref Range    Sodium 139 135 - 144 mmol/L    Potassium 3.6 (L) 3.7 - 5.3 mmol/L    Chloride 103 98 - 107 mmol/L    CO2 21 20 - 31 mmol/L    Anion Gap 15 9 - 17 mmol/L    Glucose 121 (H) 70 - 99 mg/dL    BUN 9 6 - 20 mg/dL    Creatinine 0.7 0.7 - 1.2 mg/dL    Est, Glom Filt Rate >90 >60 mL/min/1.73m2    Calcium 8.9 8.6 - 10.4 mg/dL    Total Protein 7.3 6.4 - 8.3 g/dL    Albumin 4.2 3.5 - 5.2 g/dL    Total Bilirubin 0.3 0.3 - 1.2 mg/dL    Alkaline Phosphatase 87 40 - 129 U/L    ALT 21 5 - 41 U/L    AST 21 <40 U/L   Ethanol   Result Value Ref Range    Ethanol 121 (H) <10 mg/dL    Ethanol percent 0.121 %   Urine Drug Screen   Result Value Ref Range    Amphetamine Screen, Ur NEGATIVE NEGATIVE    Barbiturate Screen, Ur NEGATIVE NEGATIVE    Benzodiazepine Screen, Urine NEGATIVE NEGATIVE    Cocaine  administered during this encounter:   There is no immunization history on file for this patient.  Documentation of patient's or caregiver's refusal of influenza vaccine.    Screening for Metabolic Disorders for Patients on Antipsychotic Medications  (Data obtained from the EMR)    Estimated Body Mass Index  Body mass index is 24.96 kg/m².      Vital Signs/Blood Pressure  /89   Pulse 93   Temp 97.2 °F (36.2 °C) (Temporal)   Resp 14   Ht 1.651 m (5' 5\")   Wt 68 kg (150 lb)   SpO2 97%   BMI 24.96 kg/m²      Fasting Blood Glucose or Hemoglobin A1c  No results found for: \"GLU\", \"GLUCPOC\"    Hemoglobin A1C   Date Value Ref Range Status   04/18/2024 5.6 4.0 - 6.0 % Final       Discharge Diagnosis: Major Depressive Disorder recurrent, severe with psychotic features    Discharge Plan/Destination: Home    Discharge Medication List and Instructions:      Medication List        START taking these medications      cyanocobalamin 1000 MCG tablet  Take 1 tablet by mouth daily  Notes to patient: supplement     FLUoxetine 20 MG capsule  Commonly known as: PROZAC  Take 1 capsule by mouth daily  Notes to patient: Mood/clear thoughts     hydrOXYzine HCl 50 MG tablet  Commonly known as: ATARAX  Take 1 tablet by mouth 3 times daily as needed for Anxiety  Notes to patient: anxiety     traZODone 50 MG tablet  Commonly known as: DESYREL  Take 1 tablet by mouth nightly as needed for Sleep  Notes to patient: Sleep aid            CONTINUE taking these medications      amLODIPine 5 MG tablet  Commonly known as: NORVASC  Take 1 tablet by mouth daily  Notes to patient: Blood pressure     atorvastatin 20 MG tablet  Commonly known as: LIPITOR  Take 1 tablet by mouth daily  Notes to patient: cholesterol     OLANZapine 15 MG tablet  Commonly known as: ZYPREXA  Take 1 tablet by mouth nightly  Notes to patient: Mood/clear thoughts     triamcinolone 0.1 % cream  Commonly known as: KENALOG  Apply topically 2 times daily Apply topically 2

## 2024-04-20 NOTE — PLAN OF CARE
Patient has been our in the milieu this evening aloof of peers. Patient admits to some anxiety and depression but states mood has improved. Patient states he is feeling safe and ready for discharge tomorrow. Patient was compliant with scheduled medications. Patient denies any suicidal thoughts at this time. Patient agrees to come talk with staff if having any thoughts to harm himself this shift. 15 min rounds continued for patient safety.   Problem: Self Harm/Suicidality  Goal: Will have no self-injury during hospital stay  Description: INTERVENTIONS:  1.  Ensure constant observer at bedside with Q15M safety checks  2.  Maintain a safe environment  3.  Secure patient belongings  4.  Ensure family/visitors adhere to safety recommendations  5.  Ensure safety tray has been added to patient's diet order  6.  Every shift and PRN: Re-assess suicidal risk via Frequent Screener    4/19/2024 2322 by Lissa Gandara, RN  Outcome: Progressing     Problem: Depression  Goal: Will be euthymic at discharge  Description: INTERVENTIONS:  1. Administer medication as ordered  2. Provide emotional support via 1:1 interaction with staff  3. Encourage involvement in milieu/groups/activities  4. Monitor for social isolation  4/19/2024 2322 by Lissa Gandara, RN  Outcome: Progressing     Problem: Pain  Goal: Verbalizes/displays adequate comfort level or baseline comfort level  4/19/2024 2322 by Lissa Gandara, RN  Outcome: Progressing

## 2024-04-20 NOTE — PLAN OF CARE
Problem: Self Harm/Suicidality  Goal: Will have no self-injury during hospital stay  Description: INTERVENTIONS:  1.  Ensure constant observer at bedside with Q15M safety checks  2.  Maintain a safe environment  3.  Secure patient belongings  4.  Ensure family/visitors adhere to safety recommendations  5.  Ensure safety tray has been added to patient's diet order  6.  Every shift and PRN: Re-assess suicidal risk via Frequent Screener    4/20/2024 0925 by Tomasa Hawkins RN  Outcome: Progressing     Problem: Depression  Goal: Will be euthymic at discharge  Description: INTERVENTIONS:  1. Administer medication as ordered  2. Provide emotional support via 1:1 interaction with staff  3. Encourage involvement in milieu/groups/activities  4. Monitor for social isolation  4/20/2024 0925 by Tomasa Hawkins RN  Outcome: Progressing     Problem: Pain  Goal: Verbalizes/displays adequate comfort level or baseline comfort level  4/20/2024 0925 by Tomasa Hawkins RN  Outcome: Progressing     Problem: Safety - Adult  Goal: Free from fall injury  Outcome: Progressing     Patient denied suicidal and homicidal ideations. Patient denied depression, described his moods as \"I'm Fine\". Patient is mostly preoccupied during assessment, is anxious to be discharged and go home. Patient is aloof in day area, rests in his bed. Patient uses wheelchair to ambulate on unit, independent in transferring self. Patient remains free from falls. Patient is encouraged to attend group. Patient does report pain in his ankle, provided scheduled pain medication.

## 2024-04-20 NOTE — GROUP NOTE
Group Therapy Note    Date: 4/20/2024    Group Start Time: 1000  Group End Time: 1055  Group Topic: Psychoeducation    [facility]    Jackeline Lopez MSW, KAMRAN        Group Therapy Note    Attendees: 5/12       Patient refused to attend psychoeducation group at 10:00 am after encouragement from staff.  1:1 talk time provided as alternative to group session.      Discipline Responsible: /Counselor      Signature:  MARCIE Gonzalez, KAMRAN

## 2024-04-20 NOTE — DISCHARGE INSTR - DIET

## 2024-04-20 NOTE — DISCHARGE INSTR - OTHER ORDERS
Make sure to follow up with outpatient facility     Take all medications as instructed     Avoid drugs and alcohol while taking medications

## 2024-04-20 NOTE — GROUP NOTE
Group Therapy Note    Date: 4/20/2024    Group Start Time: 0900  Group End Time: 0930  Group Topic: Group Documentation    Kt Bloom        Group Therapy Note    Attendees: 4/12    Goal  Group Note        Date: 4/20/2024 Start Time: 0900 End Time: 0930      Number of Participants in Group & Unit Census:  4/12    Topic: goal setting group    Goal of Group: Morning goal group session, patient has opportunity to reflect on what they need to accomplish to achieve discharge, and decide on a step towards that that they would like to accomplish by the end of the day today.       Comments:     Patient did not participate in  Goal  group, despite staff encouragement and explanation of benefits.  Patient remain seclusive to self.  Q15 minute safety checks maintained for patient safety and will continue to encourage patient to attend unit programming.     Modes of Intervention: Support and Socialization      Discipline Responsible: Behavorial Health Tech      Signature:  Kt Jerry

## 2024-04-21 LAB
EKG ATRIAL RATE: 54 BPM
EKG P AXIS: 54 DEGREES
EKG P-R INTERVAL: 160 MS
EKG Q-T INTERVAL: 420 MS
EKG QRS DURATION: 88 MS
EKG QTC CALCULATION (BAZETT): 398 MS
EKG R AXIS: 7 DEGREES
EKG T AXIS: 31 DEGREES
EKG VENTRICULAR RATE: 54 BPM

## 2024-04-21 NOTE — DISCHARGE SUMMARY
triamcinolone (KENALOG) 0.1 % cream Apply topically 2 times daily Apply topically 2 times daily., Topical, 2 TIMES DAILY Starting Fri 4/19/2024, Disp-45 g, R-0, Normal           STOP taking these medications       zolpidem (AMBIEN) 10 MG tablet Comments:   Reason for Stopping:         naproxen (NAPROSYN) 500 MG tablet Comments:   Reason for Stopping:         ibuprofen (CHILDRENS ADVIL) 100 MG/5ML suspension Comments:   Reason for Stopping:         dolutegravir sodium (TIVICAY) 50 MG tablet Comments:   Reason for Stopping:                Core Measures statement:   Not applicable    Discharge Exam:  Level of consciousness:  Within normal limits  Appearance: Street clothes, seated, with good grooming  Behavior/Motor: No abnormalities noted  Attitude toward examiner:  Cooperative, attentive, good eye contact  Speech:  spontaneous, normal rate, normal volume and well articulated  Mood:  euthymic  Affect:  Full range  Thought processes:  linear, goal directed and coherent  Thought content:  denies homicidal ideation  Suicidal Ideation:  denies suicidal ideation  Delusions:  no evidence of delusions  Perceptual Disturbance:  denies any perceptual disturbance  Cognition:  Intact  Memory: age appropriate  Insight & Judgement: fair  Medication side effects: denies     Disposition: home    Patient Instructions:   Activity: activity as tolerated  1. Patient instructed to take medications regularly and follow up with outpatient appointments.     Follow-up as scheduled with outpatient [facility] mental health      Signed:    Electronically signed by TREASURE FONSECA MD on 4/21/24 at 7:29 AM EDT    Time Spent on discharge is more than 30 minutes in the examination, evaluation, counseling and review of medications and discharge plan.

## 2024-04-22 NOTE — CARE COORDINATION
Name: Gary Jones    : 1985    Auth number: N/A     Discharge Date: 24    Destination: home     Discharge Medications:      Medication List        START taking these medications      cyanocobalamin 1000 MCG tablet  Take 1 tablet by mouth daily  Notes to patient: supplement     FLUoxetine 20 MG capsule  Commonly known as: PROZAC  Take 1 capsule by mouth daily  Notes to patient: Mood/clear thoughts     hydrOXYzine HCl 50 MG tablet  Commonly known as: ATARAX  Take 1 tablet by mouth 3 times daily as needed for Anxiety  Notes to patient: anxiety     traZODone 50 MG tablet  Commonly known as: DESYREL  Take 1 tablet by mouth nightly as needed for Sleep  Notes to patient: Sleep aid            CONTINUE taking these medications      amLODIPine 5 MG tablet  Commonly known as: NORVASC  Take 1 tablet by mouth daily  Notes to patient: Blood pressure     atorvastatin 20 MG tablet  Commonly known as: LIPITOR  Take 1 tablet by mouth daily  Notes to patient: cholesterol     OLANZapine 15 MG tablet  Commonly known as: ZYPREXA  Take 1 tablet by mouth nightly  Notes to patient: Mood/clear thoughts     triamcinolone 0.1 % cream  Commonly known as: KENALOG  Apply topically 2 times daily Apply topically 2 times daily.  Notes to patient: Skin irritation     Triumeq 600- MG Tabs  Generic drug: abacavir-dolutegravir-lamiVUDine  Take 1 tablet by mouth daily  Notes to patient: infection            STOP taking these medications      naproxen 500 MG tablet  Commonly known as: NAPROSYN     zolpidem 10 MG tablet  Commonly known as: AMBIEN               Where to Get Your Medications        These medications were sent to [id number] - [address] -  [phone] - [phone]  [address] [place] [address]      Phone: [phone]   amLODIPine 5 MG tablet  atorvastatin 20 MG tablet  cyanocobalamin 1000 MCG tablet  FLUoxetine 20 MG capsule  hydrOXYzine HCl 50 MG tablet  OLANZapine

## 2024-05-06 ENCOUNTER — HOSPITAL ENCOUNTER (INPATIENT)
Age: 39
LOS: 3 days | Discharge: HOME | End: 2024-05-10
Attending: STUDENT IN AN ORGANIZED HEALTH CARE EDUCATION/TRAINING PROGRAM | Admitting: PSYCHIATRY & NEUROLOGY
Payer: COMMERCIAL

## 2024-05-06 DIAGNOSIS — R44.0 AUDITORY HALLUCINATIONS: ICD-10-CM

## 2024-05-06 DIAGNOSIS — R45.851 SUICIDAL IDEATION: Primary | ICD-10-CM

## 2024-05-06 LAB
ALBUMIN SERPL-MCNC: 4.6 G/DL (ref 3.5–5.2)
ALP SERPL-CCNC: 88 U/L (ref 40–129)
ALT SERPL-CCNC: 41 U/L (ref 5–41)
ANION GAP SERPL CALCULATED.3IONS-SCNC: 18 MMOL/L (ref 9–17)
AST SERPL-CCNC: 43 U/L
BILIRUB SERPL-MCNC: 0.6 MG/DL (ref 0.3–1.2)
BUN SERPL-MCNC: 12 MG/DL (ref 6–20)
CALCIUM SERPL-MCNC: 9 MG/DL (ref 8.6–10.4)
CHLORIDE SERPL-SCNC: 104 MMOL/L (ref 98–107)
CO2 SERPL-SCNC: 17 MMOL/L (ref 20–31)
CREAT SERPL-MCNC: 0.9 MG/DL (ref 0.7–1.2)
ETHANOL PERCENT: 0.29 %
ETHANOLAMINE SERPL-MCNC: 292 MG/DL
GFR, ESTIMATED: >90 ML/MIN/1.73M2
GLUCOSE SERPL-MCNC: 128 MG/DL (ref 70–99)
POTASSIUM SERPL-SCNC: 4.1 MMOL/L (ref 3.7–5.3)
PROT SERPL-MCNC: 8.3 G/DL (ref 6.4–8.3)
SODIUM SERPL-SCNC: 139 MMOL/L (ref 135–144)

## 2024-05-06 PROCEDURE — 36415 COLL VENOUS BLD VENIPUNCTURE: CPT

## 2024-05-06 PROCEDURE — G0480 DRUG TEST DEF 1-7 CLASSES: HCPCS

## 2024-05-06 PROCEDURE — 85025 COMPLETE CBC W/AUTO DIFF WBC: CPT

## 2024-05-06 PROCEDURE — 80053 COMPREHEN METABOLIC PANEL: CPT

## 2024-05-06 PROCEDURE — 99285 EMERGENCY DEPT VISIT HI MDM: CPT

## 2024-05-06 ASSESSMENT — PAIN SCALES - GENERAL: PAINLEVEL_OUTOF10: 4

## 2024-05-06 ASSESSMENT — ENCOUNTER SYMPTOMS
EYE REDNESS: 0
SHORTNESS OF BREATH: 0
SORE THROAT: 0
DIARRHEA: 0
NAUSEA: 0
EYE DISCHARGE: 0
VOMITING: 0
ABDOMINAL PAIN: 0
RHINORRHEA: 0
CHEST TIGHTNESS: 0

## 2024-05-06 ASSESSMENT — LIFESTYLE VARIABLES
HOW MANY STANDARD DRINKS CONTAINING ALCOHOL DO YOU HAVE ON A TYPICAL DAY: 5 OR 6
HOW OFTEN DO YOU HAVE A DRINK CONTAINING ALCOHOL: 4 OR MORE TIMES A WEEK

## 2024-05-06 ASSESSMENT — PAIN - FUNCTIONAL ASSESSMENT: PAIN_FUNCTIONAL_ASSESSMENT: 0-10

## 2024-05-07 PROBLEM — F10.90 ALCOHOL USE DISORDER: Status: ACTIVE | Noted: 2024-05-07

## 2024-05-07 PROBLEM — K85.90 ACUTE PANCREATITIS: Status: ACTIVE | Noted: 2022-03-17

## 2024-05-07 LAB
ATYPICAL LYMPHOCYTE ABSOLUTE COUNT: 0.06 K/UL
ATYPICAL LYMPHOCYTES: 1 %
BASOPHILS # BLD: 0 K/UL (ref 0–0.2)
BASOPHILS NFR BLD: 0 % (ref 0–2)
EOSINOPHIL # BLD: 0.11 K/UL (ref 0–0.4)
EOSINOPHILS RELATIVE PERCENT: 2 % (ref 0–4)
ERYTHROCYTE [DISTWIDTH] IN BLOOD BY AUTOMATED COUNT: 14.6 % (ref 11.5–14.9)
FOLATE SERPL-MCNC: 4.6 NG/ML (ref 4.8–24.2)
HAV IGM SERPL QL IA: NONREACTIVE
HBV CORE IGM SERPL QL IA: NONREACTIVE
HBV SURFACE AG SERPL QL IA: NONREACTIVE
HCT VFR BLD AUTO: 40.5 % (ref 41–53)
HCV AB SERPL QL IA: NONREACTIVE
HGB BLD-MCNC: 13.7 G/DL (ref 13.5–17.5)
LYMPHOCYTES NFR BLD: 3.31 K/UL (ref 1–4.8)
LYMPHOCYTES RELATIVE PERCENT: 58 % (ref 24–44)
MCH RBC QN AUTO: 34 PG (ref 26–34)
MCHC RBC AUTO-ENTMCNC: 33.9 G/DL (ref 31–37)
MCV RBC AUTO: 100.2 FL (ref 80–100)
MONOCYTES NFR BLD: 0.17 K/UL (ref 0.1–1.3)
MONOCYTES NFR BLD: 3 % (ref 1–7)
MORPHOLOGY: ABNORMAL
NEUTROPHILS NFR BLD: 36 % (ref 36–66)
NEUTS SEG NFR BLD: 2.05 K/UL (ref 1.3–9.1)
PLATELET # BLD AUTO: 336 K/UL (ref 150–450)
PMV BLD AUTO: 6.8 FL (ref 6–12)
RBC # BLD AUTO: 4.04 M/UL (ref 4.5–5.9)
VIT B12 SERPL-MCNC: 215 PG/ML (ref 232–1245)
WBC OTHER # BLD: 5.7 K/UL (ref 3.5–11)

## 2024-05-07 PROCEDURE — 80074 ACUTE HEPATITIS PANEL: CPT

## 2024-05-07 PROCEDURE — 6370000000 HC RX 637 (ALT 250 FOR IP): Performed by: PSYCHIATRY & NEUROLOGY

## 2024-05-07 PROCEDURE — APPSS60 APP SPLIT SHARED TIME 46-60 MINUTES

## 2024-05-07 PROCEDURE — 99222 1ST HOSP IP/OBS MODERATE 55: CPT | Performed by: INTERNAL MEDICINE

## 2024-05-07 PROCEDURE — 1240000000 HC EMOTIONAL WELLNESS R&B

## 2024-05-07 PROCEDURE — 6370000000 HC RX 637 (ALT 250 FOR IP): Performed by: STUDENT IN AN ORGANIZED HEALTH CARE EDUCATION/TRAINING PROGRAM

## 2024-05-07 PROCEDURE — GZHZZZZ GROUP PSYCHOTHERAPY: ICD-10-PCS | Performed by: INTERNAL MEDICINE

## 2024-05-07 PROCEDURE — GZ56ZZZ INDIVIDUAL PSYCHOTHERAPY, SUPPORTIVE: ICD-10-PCS | Performed by: INTERNAL MEDICINE

## 2024-05-07 PROCEDURE — 82746 ASSAY OF FOLIC ACID SERUM: CPT

## 2024-05-07 PROCEDURE — 6370000000 HC RX 637 (ALT 250 FOR IP): Performed by: INTERNAL MEDICINE

## 2024-05-07 PROCEDURE — 82607 VITAMIN B-12: CPT

## 2024-05-07 PROCEDURE — 99223 1ST HOSP IP/OBS HIGH 75: CPT | Performed by: PSYCHIATRY & NEUROLOGY

## 2024-05-07 PROCEDURE — 36415 COLL VENOUS BLD VENIPUNCTURE: CPT

## 2024-05-07 RX ORDER — POLYETHYLENE GLYCOL 3350 17 G
2 POWDER IN PACKET (EA) ORAL
Status: DISCONTINUED | OUTPATIENT
Start: 2024-05-07 | End: 2024-05-10 | Stop reason: HOSPADM

## 2024-05-07 RX ORDER — LORAZEPAM 1 MG/1
2 TABLET ORAL EVERY 6 HOURS PRN
Status: DISCONTINUED | OUTPATIENT
Start: 2024-05-07 | End: 2024-05-10 | Stop reason: HOSPADM

## 2024-05-07 RX ORDER — HALOPERIDOL 5 MG/ML
5 INJECTION INTRAMUSCULAR EVERY 6 HOURS PRN
Status: DISCONTINUED | OUTPATIENT
Start: 2024-05-07 | End: 2024-05-10 | Stop reason: HOSPADM

## 2024-05-07 RX ORDER — HYDROXYZINE HYDROCHLORIDE 25 MG/1
25 TABLET, FILM COATED ORAL ONCE
Status: COMPLETED | OUTPATIENT
Start: 2024-05-07 | End: 2024-05-07

## 2024-05-07 RX ORDER — GAUZE BANDAGE 2" X 2"
100 BANDAGE TOPICAL DAILY
Status: DISCONTINUED | OUTPATIENT
Start: 2024-05-07 | End: 2024-05-10 | Stop reason: HOSPADM

## 2024-05-07 RX ORDER — HALOPERIDOL 5 MG/1
5 TABLET ORAL EVERY 6 HOURS PRN
Status: DISCONTINUED | OUTPATIENT
Start: 2024-05-07 | End: 2024-05-10 | Stop reason: HOSPADM

## 2024-05-07 RX ORDER — IBUPROFEN 400 MG/1
400 TABLET ORAL EVERY 6 HOURS PRN
Status: DISCONTINUED | OUTPATIENT
Start: 2024-05-07 | End: 2024-05-10 | Stop reason: HOSPADM

## 2024-05-07 RX ORDER — LORAZEPAM 2 MG/ML
2 INJECTION INTRAMUSCULAR EVERY 6 HOURS PRN
Status: DISCONTINUED | OUTPATIENT
Start: 2024-05-07 | End: 2024-05-10 | Stop reason: HOSPADM

## 2024-05-07 RX ORDER — DIPHENHYDRAMINE HYDROCHLORIDE 50 MG/ML
50 INJECTION INTRAMUSCULAR; INTRAVENOUS EVERY 6 HOURS PRN
Status: DISCONTINUED | OUTPATIENT
Start: 2024-05-07 | End: 2024-05-10 | Stop reason: HOSPADM

## 2024-05-07 RX ORDER — AMLODIPINE BESYLATE 5 MG/1
5 TABLET ORAL DAILY
Status: DISCONTINUED | OUTPATIENT
Start: 2024-05-07 | End: 2024-05-10 | Stop reason: HOSPADM

## 2024-05-07 RX ORDER — M-VIT,TX,IRON,MINS/CALC/FOLIC 27MG-0.4MG
1 TABLET ORAL DAILY
Status: DISCONTINUED | OUTPATIENT
Start: 2024-05-07 | End: 2024-05-10 | Stop reason: HOSPADM

## 2024-05-07 RX ORDER — POLYETHYLENE GLYCOL 3350 17 G/17G
17 POWDER, FOR SOLUTION ORAL DAILY PRN
Status: DISCONTINUED | OUTPATIENT
Start: 2024-05-07 | End: 2024-05-10 | Stop reason: HOSPADM

## 2024-05-07 RX ORDER — FLUOXETINE HYDROCHLORIDE 20 MG/1
20 CAPSULE ORAL DAILY
Status: DISCONTINUED | OUTPATIENT
Start: 2024-05-07 | End: 2024-05-10 | Stop reason: HOSPADM

## 2024-05-07 RX ORDER — LANOLIN ALCOHOL/MO/W.PET/CERES
1000 CREAM (GRAM) TOPICAL DAILY
Status: DISCONTINUED | OUTPATIENT
Start: 2024-05-07 | End: 2024-05-10 | Stop reason: HOSPADM

## 2024-05-07 RX ORDER — LAMIVUDINE 100 MG/1
300 TABLET, FILM COATED ORAL DAILY
Status: DISCONTINUED | OUTPATIENT
Start: 2024-05-07 | End: 2024-05-10 | Stop reason: HOSPADM

## 2024-05-07 RX ORDER — HYDROXYZINE 50 MG/1
50 TABLET, FILM COATED ORAL 3 TIMES DAILY PRN
Status: DISCONTINUED | OUTPATIENT
Start: 2024-05-07 | End: 2024-05-10 | Stop reason: HOSPADM

## 2024-05-07 RX ORDER — ACETAMINOPHEN 325 MG/1
650 TABLET ORAL EVERY 6 HOURS PRN
Status: DISCONTINUED | OUTPATIENT
Start: 2024-05-07 | End: 2024-05-10 | Stop reason: HOSPADM

## 2024-05-07 RX ORDER — ATORVASTATIN CALCIUM 20 MG/1
20 TABLET, FILM COATED ORAL NIGHTLY
Status: DISCONTINUED | OUTPATIENT
Start: 2024-05-07 | End: 2024-05-10 | Stop reason: HOSPADM

## 2024-05-07 RX ORDER — MAGNESIUM HYDROXIDE/ALUMINUM HYDROXICE/SIMETHICONE 120; 1200; 1200 MG/30ML; MG/30ML; MG/30ML
30 SUSPENSION ORAL EVERY 6 HOURS PRN
Status: DISCONTINUED | OUTPATIENT
Start: 2024-05-07 | End: 2024-05-10 | Stop reason: HOSPADM

## 2024-05-07 RX ORDER — TRAZODONE HYDROCHLORIDE 50 MG/1
50 TABLET ORAL NIGHTLY PRN
Status: DISCONTINUED | OUTPATIENT
Start: 2024-05-07 | End: 2024-05-10 | Stop reason: HOSPADM

## 2024-05-07 RX ORDER — ABACAVIR 300 MG/1
600 TABLET ORAL DAILY
Status: DISCONTINUED | OUTPATIENT
Start: 2024-05-07 | End: 2024-05-10 | Stop reason: HOSPADM

## 2024-05-07 RX ORDER — OLANZAPINE 15 MG/1
15 TABLET ORAL NIGHTLY
Status: DISCONTINUED | OUTPATIENT
Start: 2024-05-07 | End: 2024-05-10 | Stop reason: HOSPADM

## 2024-05-07 RX ORDER — TRAZODONE HYDROCHLORIDE 50 MG/1
50 TABLET ORAL ONCE
Status: COMPLETED | OUTPATIENT
Start: 2024-05-07 | End: 2024-05-07

## 2024-05-07 RX ADMIN — TRAZODONE HYDROCHLORIDE 50 MG: 50 TABLET ORAL at 22:31

## 2024-05-07 RX ADMIN — ABACAVIR 600 MG: 300 TABLET ORAL at 13:28

## 2024-05-07 RX ADMIN — FLUOXETINE 20 MG: 20 CAPSULE ORAL at 13:30

## 2024-05-07 RX ADMIN — HYDROXYZINE HYDROCHLORIDE 25 MG: 25 TABLET, FILM COATED ORAL at 04:35

## 2024-05-07 RX ADMIN — OLANZAPINE 15 MG: 15 TABLET, FILM COATED ORAL at 21:37

## 2024-05-07 RX ADMIN — TRAZODONE HYDROCHLORIDE 50 MG: 50 TABLET ORAL at 04:35

## 2024-05-07 RX ADMIN — THIAMINE HCL TAB 100 MG 100 MG: 100 TAB at 13:29

## 2024-05-07 RX ADMIN — Medication 1 TABLET: at 13:29

## 2024-05-07 RX ADMIN — ATORVASTATIN CALCIUM 20 MG: 20 TABLET, FILM COATED ORAL at 21:37

## 2024-05-07 RX ADMIN — CYANOCOBALAMIN TAB 1000 MCG 1000 MCG: 1000 TAB at 13:34

## 2024-05-07 RX ADMIN — LAMIVUDINE 300 MG: 100 TABLET, FILM COATED ORAL at 13:28

## 2024-05-07 RX ADMIN — AMLODIPINE BESYLATE 5 MG: 5 TABLET ORAL at 13:29

## 2024-05-07 RX ADMIN — DOLUTEGRAVIR SODIUM 50 MG: 50 TABLET, FILM COATED ORAL at 13:30

## 2024-05-07 ASSESSMENT — PATIENT HEALTH QUESTIONNAIRE - PHQ9
10. IF YOU CHECKED OFF ANY PROBLEMS, HOW DIFFICULT HAVE THESE PROBLEMS MADE IT FOR YOU TO DO YOUR WORK, TAKE CARE OF THINGS AT HOME, OR GET ALONG WITH OTHER PEOPLE: SOMEWHAT DIFFICULT
1. LITTLE INTEREST OR PLEASURE IN DOING THINGS: SEVERAL DAYS
SUM OF ALL RESPONSES TO PHQ QUESTIONS 1-9: 10
9. THOUGHTS THAT YOU WOULD BE BETTER OFF DEAD, OR OF HURTING YOURSELF: SEVERAL DAYS
5. POOR APPETITE OR OVEREATING: SEVERAL DAYS
3. TROUBLE FALLING OR STAYING ASLEEP: MORE THAN HALF THE DAYS
2. FEELING DOWN, DEPRESSED OR HOPELESS: MORE THAN HALF THE DAYS
8. MOVING OR SPEAKING SO SLOWLY THAT OTHER PEOPLE COULD HAVE NOTICED. OR THE OPPOSITE, BEING SO FIGETY OR RESTLESS THAT YOU HAVE BEEN MOVING AROUND A LOT MORE THAN USUAL: NOT AT ALL
4. FEELING TIRED OR HAVING LITTLE ENERGY: SEVERAL DAYS
7. TROUBLE CONCENTRATING ON THINGS, SUCH AS READING THE NEWSPAPER OR WATCHING TELEVISION: NOT AT ALL
SUM OF ALL RESPONSES TO PHQ QUESTIONS 1-9: 10
6. FEELING BAD ABOUT YOURSELF - OR THAT YOU ARE A FAILURE OR HAVE LET YOURSELF OR YOUR FAMILY DOWN: MORE THAN HALF THE DAYS
SUM OF ALL RESPONSES TO PHQ QUESTIONS 1-9: 10
SUM OF ALL RESPONSES TO PHQ9 QUESTIONS 1 & 2: 3
SUM OF ALL RESPONSES TO PHQ QUESTIONS 1-9: 9

## 2024-05-07 ASSESSMENT — PAIN SCALES - GENERAL: PAINLEVEL_OUTOF10: 3

## 2024-05-07 ASSESSMENT — LIFESTYLE VARIABLES
HOW OFTEN DO YOU HAVE A DRINK CONTAINING ALCOHOL: 4 OR MORE TIMES A WEEK
HOW MANY STANDARD DRINKS CONTAINING ALCOHOL DO YOU HAVE ON A TYPICAL DAY: 3 OR 4
HOW OFTEN DO YOU HAVE A DRINK CONTAINING ALCOHOL: 2-3 TIMES A WEEK
HOW MANY STANDARD DRINKS CONTAINING ALCOHOL DO YOU HAVE ON A TYPICAL DAY: 5 OR 6

## 2024-05-07 ASSESSMENT — SLEEP AND FATIGUE QUESTIONNAIRES
SLEEP PATTERN: RESTLESSNESS
DO YOU USE A SLEEP AID: NO
DO YOU HAVE DIFFICULTY SLEEPING: YES
AVERAGE NUMBER OF SLEEP HOURS: 4

## 2024-05-07 NOTE — PROGRESS NOTES

## 2024-05-07 NOTE — ED NOTES
Pt pecefully resting on recliner in PPU. Pt has good chest rise and fall. Unable to engage pt at this time.

## 2024-05-07 NOTE — PROGRESS NOTES
Department of Psychiatry  Attending Physician Psychiatric Assessment     Reason for Admission to Psychiatric Unit:  Threat to self requiring 24 hour professional observation  Command hallucinations directing harm to self or others; risk of the patient taking action  A mental disorder causing major disability in social, interpersonal, occupational, and/or educational functioning that is leading to dangerous or life-threatening functioning, and that can only be addressed in an acute inpatient setting   A mental disorder that causes an inability to maintain adequate nurtrition or self-care, and family/community support cannot provide reliable, essential care, so that the patient cannont function at a less intensive level of care during evaluation and treatment   Failure of outpatient psychiatry treatment so that the beneficiary requires 24 hour professional observation and care  Concerns about patient's safety in the community  Past Mental Health Diagnosis: a history of  Major Depression and Alcohol and/or Drug Use Disorder  Triggering event or precipitating factor: Alcohol/Drug Relapse and Relationship Issues  Length of time/duration of triggering event: Months  Legal Status: Voluntary    CHIEF COMPLAINT: Depression with suicidal ideation    History obtained from: Patient, electronic medical record          HISTORY OF PRESENT ILLNESS:    Gary Jones is a 39 y.o. male who has a past medical history of major depressive disorder with psychotic features, insomnia, HIV. Patient presented to the ED via a friend. Pt reports feeling depressed and suicidal. Pt is hearing voices that are telling pt to kill self. Pt identifies pt's medications not working properly as the trigger to pt's SI. Pt reports drinks a \"few beers\" and a \"bottle of wine.     Gary was found resting in his room and agreed to meet privately.  He notes ongoing suicidal ideations with command auditory hallucinations to harm himself.  He also endorses
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
## 2024-05-07 NOTE — GROUP NOTE
Group Therapy Note    Date: 5/7/2024    Group Start Time: 1100  Group End Time: 1145  Group Topic: Cognitive Skills    Debbie Petersen CTRS        Group Therapy Note    Attendees: 6/20     Topic: To increase socialization and practice decision making skills, and improve concentration      Patient did not participate in Cognitive Skills Group at 1100, despite staff encouragement   and explanation of benefits. Pt was seclusive to self and room during group.     Q15 minute safety checks maintained for patient safety and will continue to encourage   patient to attend unit programming.       Discipline Responsible: Psychoeducational Specialist  Signature:  GRACE MCKEON

## 2024-05-07 NOTE — PROGRESS NOTES
Behavioral Services  Medicare Certification Upon Admission    I certify that this patient's inpatient psychiatric hospital admission is medically necessary for:    [x] (1) Treatment which could reasonably be expected to improve this patient's condition,       [x] (2) Or for diagnostic study;     AND     [x](2) The inpatient psychiatric services are provided while the individual is under the care of a physician and are included in the individualized plan of care.    Estimated length of stay/service 2-9 days    Plan for post-hospital care -outpatient care    Electronically signed by GEOFF STOVALL MD on 5/7/2024 at 9:18 AM

## 2024-05-07 NOTE — GROUP NOTE
Group Therapy Note    Date: 5/7/2024    Group Start Time: 1340  Group End Time: 1420  Group Topic: Cognitive Skills    Debbie Petersen CTRS        Group Therapy Note    Attendees: 7/18     Topic: To increase socialization and practice problem solving, brainstorming with peers on a team,  and communication skills      Patient did not participate in Cognitive Skills Group at 1340, despite staff encouragement   and explanation of benefits. Pt was seclusive to self and room during group. Pt was in behavioral control.     Q15 minute safety checks maintained for patient safety and will continue to encourage   patient to attend unit programming.       Discipline Responsible: Psychoeducational Specialist  Signature:  GRACE MCKEON

## 2024-05-07 NOTE — GROUP NOTE
Group Therapy Note    Date: 5/7/2024    Group Start Time: 0900  Group End Time: 0915  Group Topic: Community Meeting    Brianne Le    Community Meeting Group Note        Date: 5/7/2024 Start Time: 9am  End Time: 0915      Number of Participants in Group & Unit Census:  2/22      Goal of Group: To discuss daily goals       Comments:     Patient did not participate in Community Meeting group, despite staff encouragement and explanation of benefits.  Patient remain seclusive to self.  Q15 minute safety checks maintained for patient safety and will continue to encourage patient to attend unit programming.

## 2024-05-07 NOTE — ED NOTES
Gary Jones is a 39 year old male who presents to the ED via a friend. Pt reports feeling depressed and suicidal. Pt is hearing voices that are telling pt to kill self. Pt identifies pt's medications not working properly as the trigger to pt's SI. Pt reports drinks a \"few beers\" and a \"bottle of wine.\"     Pt to be re-evlauted once pt is legally sober.

## 2024-05-07 NOTE — ED NOTES
Pt encouraged to provide a urine sample. Pt stating pt does not have to use the restroom at this time.

## 2024-05-07 NOTE — CARE COORDINATION
[facility] Biopsychosocial Assessment    Current Level of Psychosocial Functioning     Independent XX  Dependent    Minimal Assist     Psychosocial High Risk Factors (check all that apply)    Unable to obtain meds   Chronic illness/pain    Substance abuse XX  Lack of Family Support XX  Financial stress XX  Isolation   Inadequate Community Resources  Suicide attempt(s) XX  Not taking medications XX  Victim of crime   Developmental Delay  Unable to manage personal needs    Age 65 or older   Homeless  No transportation   Readmission within 30 days  Unemployment  Traumatic Event    Psychiatric Advanced Directives: N/A    Family to Involve in Treatment:     Sexual Orientation:  N/A    Patient Strengths: Housing, Insurance, Linked to [facility]    Patient Barriers:  Substance use, Lack of support system, Lack of income, Registered Sex Offender, Lack of coping skills    Opiate Education Provided:  Patient denied opiate use. Patient reports drinking alcohol \"at least two times a week.\" Patient offered AOD folder; patient declined.    CMHC/mental health history: Patient has a history of inpatient treatment; last admitted to the [facility] on 4/16/24. Patient is linked to [facility] and states he also is linked to [facility] for outpatient.    Plan of Care   medication management, group/individual therapies, family meetings, psycho -education, treatment team meetings to assist with stabilization    Initial Discharge Plan:  Return home; Continue with [facility] and/or [facility]    Clinical Summary:  Patient is a 40yo male admitted to the [facility] for depression with suicidal ideation. At time of assessment, patient denies current suicidal or homicidal ideations or halluciantions; states he feels safe in the [facility].     Patient has a history of inpatient treatment; last admitted to the [facility] on 4/16/24. Patient is linked to [facility] and states he also is linked to [facility] for outpatient. Patient denied opiate use. Patient reports drinking alcohol \"at least two times a week.\" Patient

## 2024-05-07 NOTE — ED NOTES
Provisional Diagnosis:   Depression with suicidal ideation     Psychosocial and Contextual Factors: Pt has issues with social enviroment. Pt has issues with relationships. Pt has substance abuse issues. Pt has medical issues.     C-SSRS Summary:    Patient: X    Family:     Agency: X (EPIC)    Present Suicidal Behavior:     Verbal: X    Attempt:     Past Suicidal Behavior:     Verbal: X    Attempt: X    Self- Injurious/ Self-Mutilation:  Pt denies    Trauma History: None reported at this time.     Protective Factors: Pt has insurance.     Risk Factors: Pt has poor judgement and coping skills.     Substance Abuse:alcohol    Clinical Summary:  Gary Jones is a 39 year old male who presents to the ED via a friend. Pt was intoxicated with a BAL of 292. Pt is now legally sober.     Pt is suicidal. Pt is hearing voices telling pt to kill self. Pt has a history of suicide attempts. Pt denies HI. Pt has a previous diagnosis of major depressive disorder. Pt is linked with [facility]. Pt is compliant taking pt's medications however, pt does not think they are working for pt. Pt was last admitted to the [facility] 4/16/24. Pt drinks alcohol 3-4 times a week. Pt denies drug use.     Level of Care Disposition:..LUIZA consulted with  from psychiatry. Pt accepted for an inpatient admission to the [facility] for safety and stabilization.

## 2024-05-07 NOTE — PROGRESS NOTES
Pharmacy Medication History Note      List of current medications patient is taking is complete.    Source of information: [facility] ([phone]), Epic, PDMP, Sure Script dispense report    Changes made to medication list:  Medications removed (include reason, ex. therapy complete or physician discontinued, noncompliance):  none    Medications flagged for provider review:  None    Medications added/doses adjusted:  none        Current Home Medication List at Time of Admission:  Prior to Admission medications    Medication Sig   abacavir-dolutegravir-lamiVUDine (TRIUMEQ) 600- MG TABS Take 1 tablet by mouth daily   amLODIPine (NORVASC) 5 MG tablet Take 1 tablet by mouth daily   atorvastatin (LIPITOR) 20 MG tablet Take 1 tablet by mouth daily   FLUoxetine (PROZAC) 20 MG capsule Take 1 capsule by mouth daily   OLANZapine (ZYPREXA) 15 MG tablet Take 1 tablet by mouth nightly   traZODone (DESYREL) 50 MG tablet Take 1 tablet by mouth nightly as needed for Sleep   triamcinolone (KENALOG) 0.1 % cream Apply topically 2 times daily Apply topically 2 times daily.   vitamin B-12 1000 MCG tablet Take 1 tablet by mouth daily         Please let me know if you have any questions about this encounter. Thank you!    Electronically signed by Karen Sharp RPH on 5/7/2024 at 11:25 AM

## 2024-05-08 PROCEDURE — 6370000000 HC RX 637 (ALT 250 FOR IP): Performed by: INTERNAL MEDICINE

## 2024-05-08 PROCEDURE — 1240000000 HC EMOTIONAL WELLNESS R&B

## 2024-05-08 PROCEDURE — 6370000000 HC RX 637 (ALT 250 FOR IP): Performed by: PSYCHIATRY & NEUROLOGY

## 2024-05-08 PROCEDURE — 99232 SBSQ HOSP IP/OBS MODERATE 35: CPT | Performed by: INTERNAL MEDICINE

## 2024-05-08 PROCEDURE — APPSS30 APP SPLIT SHARED TIME 16-30 MINUTES

## 2024-05-08 PROCEDURE — 6360000002 HC RX W HCPCS: Performed by: INTERNAL MEDICINE

## 2024-05-08 PROCEDURE — 99232 SBSQ HOSP IP/OBS MODERATE 35: CPT | Performed by: PSYCHIATRY & NEUROLOGY

## 2024-05-08 RX ORDER — CYANOCOBALAMIN 1000 UG/ML
1000 INJECTION, SOLUTION INTRAMUSCULAR; SUBCUTANEOUS ONCE
Status: COMPLETED | OUTPATIENT
Start: 2024-05-08 | End: 2024-05-08

## 2024-05-08 RX ORDER — FOLIC ACID 1 MG/1
1 TABLET ORAL DAILY
Status: DISCONTINUED | OUTPATIENT
Start: 2024-05-08 | End: 2024-05-10 | Stop reason: HOSPADM

## 2024-05-08 RX ADMIN — CYANOCOBALAMIN 1000 MCG: 1000 INJECTION, SOLUTION INTRAMUSCULAR; SUBCUTANEOUS at 19:05

## 2024-05-08 RX ADMIN — TRAZODONE HYDROCHLORIDE 50 MG: 50 TABLET ORAL at 22:40

## 2024-05-08 RX ADMIN — THIAMINE HCL TAB 100 MG 100 MG: 100 TAB at 09:27

## 2024-05-08 RX ADMIN — FLUOXETINE 20 MG: 20 CAPSULE ORAL at 09:27

## 2024-05-08 RX ADMIN — LAMIVUDINE 300 MG: 100 TABLET, FILM COATED ORAL at 09:26

## 2024-05-08 RX ADMIN — AMLODIPINE BESYLATE 5 MG: 5 TABLET ORAL at 09:27

## 2024-05-08 RX ADMIN — CYANOCOBALAMIN TAB 1000 MCG 1000 MCG: 1000 TAB at 09:27

## 2024-05-08 RX ADMIN — DOLUTEGRAVIR SODIUM 50 MG: 50 TABLET, FILM COATED ORAL at 09:27

## 2024-05-08 RX ADMIN — OLANZAPINE 15 MG: 15 TABLET, FILM COATED ORAL at 21:30

## 2024-05-08 RX ADMIN — ABACAVIR 600 MG: 300 TABLET ORAL at 09:27

## 2024-05-08 RX ADMIN — HYDROXYZINE HYDROCHLORIDE 50 MG: 50 TABLET, FILM COATED ORAL at 21:31

## 2024-05-08 RX ADMIN — FOLIC ACID 1 MG: 1 TABLET ORAL at 19:05

## 2024-05-08 RX ADMIN — ATORVASTATIN CALCIUM 20 MG: 20 TABLET, FILM COATED ORAL at 21:31

## 2024-05-08 RX ADMIN — Medication 1 TABLET: at 09:27

## 2024-05-08 NOTE — GROUP NOTE
Group Therapy Note    Date: 5/8/2024    Group Start Time: 1340  Group End Time: 1420  Group Topic: Cognitive Skills    Debbie Petersen CTRS        Group Therapy Note    Attendees: 4/19     Topic: To increase socialization and practice multi step concentration, and communication skills.     Patient did not participate in Cognitive Skills Group at 1340, despite staff encouragement   and explanation of benefits. Pt was seclusive to self and room during group. Pt was in behavioral control.     Q15 minute safety checks maintained for patient safety and will continue to encourage   patient to attend unit programming.       Discipline Responsible: Psychoeducational Specialist  Signature:  GRACE MCKEON

## 2024-05-08 NOTE — GROUP NOTE
Group Therapy Note    Date: 5/8/2024    Group Start Time: 1000  Group End Time: 1039  Group Topic: Psychoeducation    [facility]    Zaida Ny MSW        Group Therapy Note    Attendees: 9/23       Patient was offered group therapy today but declined to participate despite encouragement from staff.  1:1 was offered.       Signature:  MARCIE Colon

## 2024-05-08 NOTE — PROGRESS NOTES
Pharmacy Med Education Group Note    Date: 5/8/24  Start Time: 1430  End Time: 1510    Number Participants in Group:  8/20    Goal:  Patient will demonstrate an understanding of the medication’s intended purpose and possible adverse effects  Topic: [facility] for Pharmacy Med Ed Group    Discipline Responsible:     OT  AT  [facility].  RT     X Other       Participation Level:     None  Minimal      X Active Listener    X Interactive    Monopolizing         Participation Quality:    X Appropriate  Inappropriate     X       Attentive        Intrusive          Sharing        Resistant          Supportive        Lethargic       Affective:     X Congruent  Incongruent  Blunted  Flat    Constricted  Anxious  Elated  Angry    Labile  Depressed  Other         Cognitive:    X Alert  Oriented PPTP     Concentration   X G  F  P   Attention Span   X G  F  P   Short-Term Memory   X G  F  P   Long-Term Memory  G  F  P   ProblemSolving/  Decision Making  G  F  P   Ability to Process  Information   X G  F  P      Contributing Factors             Delusional             Hallucinating             Flight of Ideas             Other:       Modes of Intervention:    X Education   X Support  Exploration    Clarifying  Problem Solving  Confrontation    Socialization  Limit Setting  Reality Testing    Activity  Movement  Media    Other:            Response to Learning:    X Able to verbalize current knowledge/experience    Able to verbalize/acknowledge new learning    Able to retain information    Capable of insight    Able to change behavior    Progressing to goal    Other:        Comments:     Karen Sharp PharmD, BCPS, BCPP  5/8/2024 3:14 PM  [phone]

## 2024-05-08 NOTE — GROUP NOTE
Group Therapy Note    Date: 5/8/2024    Group Start Time: 1100  Group End Time: 1145  Group Topic: Cognitive Skills    Debbie Petersen CTRS        Group Therapy Note    Attendees: 9/22        Topic: To increase socialization and practice decision making, and communication skills    Notes:  Pt was pleasant and cooperative, and did not participate in group task but was attentive and assisted peers with passing cards around table when needed to facilitate steps of task.        Status After Intervention:  Improved       Participation Level: Active Listener and helpful with steps of task       Participation Quality: Appropriate, Attentive, supportive       Speech:  Minimal but polite        Thought Process/Content: Logical, linear r/t assisting with steps         Affective Functioning: Blunted        Mood: Cooperative, passive r/t group task, did not engage in social conversation        Level of consciousness:  Alert, and Attentive        Response to Learning: Attentive to new learning, and Progressing to goal        Endings: None Reported      Modes of Intervention: Education, Support, Socialization, and Problem-solving    Discipline Responsible: Psychoeducational Specialist      Signature:  GRACE MCKEON

## 2024-05-08 NOTE — PLAN OF CARE
Problem: Self Harm/Suicidality  Goal: Will have no self-injury during hospital stay  Description: INTERVENTIONS:  1.  Ensure constant observer at bedside with Q15M safety checks  2.  Maintain a safe environment  3.  Secure patient belongings  4.  Ensure family/visitors adhere to safety recommendations  5.  Ensure safety tray has been added to patient's diet order  6.  Every shift and PRN: Re-assess suicidal risk via Frequent Screener    Outcome: Progressing  Note: Patient reports suicidal thoughts but he contracts for safety while inpatient. Patient notes that these thoughts are fleeting. Patient safety checks maintained every 15 minutes and intermittently.      Problem: Psychosis  Goal: Will report no hallucinations or delusions  Description: INTERVENTIONS:  1. Administer medication as  ordered  2. Assist with reality testing to support increasing orientation  3. Assess if patient's hallucinations or delusions are encouraging self harm or harm to others and intervene as appropriate  Outcome: Progressing  Note: Patient reports auditory hallucinations of voices that tell him to kill himself and visual hallucinations of shadows of the devil. Patient is encouraged to participate in unit programming to explore coping skills, patient verbalized understanding.      Problem: Sleep Disturbance  Goal: Will exhibit normal sleeping pattern  Description: INTERVENTIONS:  1. Administer medication as ordered  2. Decrease environmental stimuli, including noise, as appropriate  3. Discourage social isolation and naps during the day  Outcome: Progressing  Note: Patient reports having a poor sleep pattern and requests as needed Trazodone 50 mg before bedtime.

## 2024-05-08 NOTE — GROUP NOTE
Group Therapy Note    Date: 5/7/2024    Group Start Time: 2030  Group End Time: 2130  Group Topic: Relaxation    Tomasa He        Group Therapy Note    Attendees: 10/21         Participation Level: Minimal    Participation Quality: Appropriate  Mood: flat      Modes of Intervention: Socialization, Activity, and Media      Discipline Responsible: Behavorial Health Tech      Signature:  Tomasa Contreras

## 2024-05-08 NOTE — PLAN OF CARE
[facility]  Initial Interdisciplinary Treatment Plan NO      Original treatment plan Date & Time: 5/8/2024   1:50 pm    Admission Type:  Admission Type: Voluntary    Reason for admission:   Reason for Admission: Patient reports command hallucinations telling him to kill himself, seeing \"shadows of the devil\". Has been drinking 4-5 times weekly with increase in suicidal thoughts with no plan.    Estimated Length of Stay:  5-7days  Estimated Discharge Date: to be determined by physician    PATIENT STRENGTHS:  Patient Strengths:   Patient Strengths and Limitations:Limitations: Difficult relationships / poor social skills, Multiple barriers to leisure interests, Inappropriate/potentially harmful leisure interests, Difficulty problem solving/relies on others to help solve problems, Tendency to isolate self  Addictive Behavior: Addictive Behavior  In the Past 3 Months, Have You Felt or Has Someone Told You That You Have a Problem With  : None  Medical Problems:  Past Medical History:   Diagnosis Date    Depression with suicidal ideation 4/16/2024    HIV disease (HCC)      Status EXAM:Mental Status and Behavioral Exam  Normal: No  Level of Assistance: Independent/Self  Facial Expression: Flat, Sad  Affect: Congruent  Level of Consciousness: Alert  Frequency of Checks: 4 times per hour, close  Mood:Normal: No  Mood: Depressed, Anxious, Sad  Motor Activity:Normal: Yes  Motor Activity: Decreased  Eye Contact: Fair  Observed Behavior: Cooperative, Guarded, Preoccupied  Sexual Misconduct History: Current - no  Preception: [place] to person, [place] to time, [place] to place, [place] to situation  Attention:Normal: No  Attention: Distractible  Thought Processes: Blocking  Thought Content:Normal: No  Thought Content: Preoccupations  Depression Symptoms: Isolative, Loss of interest, Change in energy level, Feelings of helplessness, Feelings of hopelessess, Feelings of worthlessness  Anxiety Symptoms:

## 2024-05-08 NOTE — PROGRESS NOTES
The patient was seen face-to-face.  The patient states that he has been taking his prescribed medication.  They are causing him to have some nightmares.  He is also feeling little stiff but he also said that he is feeling more relaxed with olanzapine.  Neither of these issues is particularly bothersome.  No changes have been made to his medications today and we will continue to monitor.

## 2024-05-08 NOTE — PLAN OF CARE
Problem: Self Harm/Suicidality  Goal: Will have no self-injury during hospital stay  Description: INTERVENTIONS:  1.  Ensure constant observer at bedside with Q15M safety checks  2.  Maintain a safe environment  3.  Secure patient belongings  4.  Ensure family/visitors adhere to safety recommendations  5.  Ensure safety tray has been added to patient's diet order  6.  Every shift and PRN: Re-assess suicidal risk via Frequent Screener    5/8/2024 1921 by Linda Turner, NEO  Note: Mr. Jones denies suicidal ideation and thoughts of harm to self and others.      Problem: Psychosis  Goal: Will report no hallucinations or delusions  Description: INTERVENTIONS:  1. Administer medication as  ordered  2. Assist with reality testing to support increasing orientation  3. Assess if patient's hallucinations or delusions are encouraging self harm or harm to others and intervene as appropriate  5/8/2024 1921 by Linda Turner, NEO  Note: Mr. Jones reports seeing shadows. He notes that they are less frequent today.      Problem: Pain  Goal: Verbalizes/displays adequate comfort level or baseline comfort level  5/8/2024 1921 by Linda Turner, NEO  Note: Mr. Jones denies pain and discomfort on this shift.

## 2024-05-08 NOTE — PROGRESS NOTES
05/07/2024 NONREACTIVE  NONREACTIVE Final         Reviewed patient's current plan of care and vital signs with nursing staff.    Labs reviewed: [x] Yes    Medications  Current Facility-Administered Medications: amLODIPine (NORVASC) tablet 5 mg, 5 mg, Oral, Daily  atorvastatin (LIPITOR) tablet 20 mg, 20 mg, Oral, Nightly  FLUoxetine (PROZAC) capsule 20 mg, 20 mg, Oral, Daily  OLANZapine (ZYPREXA) tablet 15 mg, 15 mg, Oral, Nightly  traZODone (DESYREL) tablet 50 mg, 50 mg, Oral, Nightly PRN  vitamin B-12 (CYANOCOBALAMIN) tablet 1,000 mcg, 1,000 mcg, Oral, Daily  haloperidol lactate (HALDOL) injection 5 mg, 5 mg, IntraMUSCular, Q6H PRN **AND** LORazepam (ATIVAN) injection 2 mg, 2 mg, IntraMUSCular, Q6H PRN **AND** diphenhydrAMINE (BENADRYL) injection 50 mg, 50 mg, IntraMUSCular, Q6H PRN  acetaminophen (TYLENOL) tablet 650 mg, 650 mg, Oral, Q6H PRN  ibuprofen (ADVIL;MOTRIN) tablet 400 mg, 400 mg, Oral, Q6H PRN  hydrOXYzine HCl (ATARAX) tablet 50 mg, 50 mg, Oral, TID PRN  polyethylene glycol (GLYCOLAX) packet 17 g, 17 g, Oral, Daily PRN  aluminum & magnesium hydroxide-simethicone (MAALOX) 200-200-20 MG/5ML suspension 30 mL, 30 mL, Oral, Q6H PRN  nicotine polacrilex (COMMIT) lozenge 2 mg, 2 mg, Oral, Q2H PRN  haloperidol (HALDOL) tablet 5 mg, 5 mg, Oral, Q6H PRN **AND** LORazepam (ATIVAN) tablet 2 mg, 2 mg, Oral, Q6H PRN  abacavir (ZIAGEN) tablet 600 mg, 600 mg, Oral, Daily **AND** dolutegravir sodium (TIVICAY) tablet 50 mg, 50 mg, Oral, Daily **AND** lamiVUDine (EPIVIR) tablet 300 mg, 300 mg, Oral, Daily  thiamine mononitrate tablet 100 mg, 100 mg, Oral, Daily  therapeutic multivitamin-minerals 1 tablet, 1 tablet, Oral, Daily    ASSESSMENT  Severe episode of recurrent major depressive disorder, with psychotic features (HCC)         PATIENT HANDOFF  Patient symptoms are: Showing improvement  Monitor need and frequency of PRN medications.  Encourage participation in groups and milieu.  Medication changes and discharge

## 2024-05-08 NOTE — GROUP NOTE
Group Therapy Note    Date: 5/8/2024    Group Start Time: 0900  Group End Time: 0915  Group Topic: Community Meeting    Brianne Le        Group Therapy Note    Attendees: 9/22       Patient's Goal:  Do better than yesterday     Status After Intervention:  Unchanged    Participation Level: Active Listener and Interactive    Participation Quality: Appropriate and Attentive      Speech:  normal      Thought Process/Content: Logical      Affective Functioning: Congruent      Mood: anxious      Level of consciousness:  Alert and Oriented x4      Response to Learning: Able to verbalize current knowledge/experience and Able to verbalize/acknowledge new learning      Endings: None Reported    Modes of Intervention: Education and Support      Discipline Responsible: Behavorial Health Tech      Signature:  Brianne Herrera

## 2024-05-09 PROCEDURE — 6370000000 HC RX 637 (ALT 250 FOR IP): Performed by: PSYCHIATRY & NEUROLOGY

## 2024-05-09 PROCEDURE — 1240000000 HC EMOTIONAL WELLNESS R&B

## 2024-05-09 PROCEDURE — 6370000000 HC RX 637 (ALT 250 FOR IP): Performed by: INTERNAL MEDICINE

## 2024-05-09 PROCEDURE — 99232 SBSQ HOSP IP/OBS MODERATE 35: CPT | Performed by: INTERNAL MEDICINE

## 2024-05-09 PROCEDURE — 99232 SBSQ HOSP IP/OBS MODERATE 35: CPT | Performed by: PSYCHIATRY & NEUROLOGY

## 2024-05-09 RX ADMIN — CYANOCOBALAMIN TAB 1000 MCG 1000 MCG: 1000 TAB at 08:56

## 2024-05-09 RX ADMIN — LAMIVUDINE 300 MG: 100 TABLET, FILM COATED ORAL at 08:56

## 2024-05-09 RX ADMIN — FOLIC ACID 1 MG: 1 TABLET ORAL at 08:56

## 2024-05-09 RX ADMIN — FLUOXETINE 20 MG: 20 CAPSULE ORAL at 08:56

## 2024-05-09 RX ADMIN — Medication 1 TABLET: at 08:56

## 2024-05-09 RX ADMIN — TRAZODONE HYDROCHLORIDE 50 MG: 50 TABLET ORAL at 22:43

## 2024-05-09 RX ADMIN — THIAMINE HCL TAB 100 MG 100 MG: 100 TAB at 08:56

## 2024-05-09 RX ADMIN — AMLODIPINE BESYLATE 5 MG: 5 TABLET ORAL at 08:56

## 2024-05-09 RX ADMIN — ABACAVIR 600 MG: 300 TABLET ORAL at 08:56

## 2024-05-09 RX ADMIN — OLANZAPINE 15 MG: 15 TABLET, FILM COATED ORAL at 21:52

## 2024-05-09 RX ADMIN — DOLUTEGRAVIR SODIUM 50 MG: 50 TABLET, FILM COATED ORAL at 08:56

## 2024-05-09 RX ADMIN — ATORVASTATIN CALCIUM 20 MG: 20 TABLET, FILM COATED ORAL at 21:52

## 2024-05-09 NOTE — GROUP NOTE
Group Therapy Note    Date: 5/9/2024    Group Start Time: 1100  Group End Time: 1145  Group Topic: Cognitive Skills    Debbie Petersen CTRS        Group Therapy Note    Attendees: 8/18     Topic: To increase socialization and practice decision making, and explore perception     Notes:  Pt was polite and attentive but pt was seclusive to self and sat on periphery of group,   and did not participate in group task.        Status After Intervention:  Unchanged        Participation Level: Active Listener       Participation Quality:  Attentive      Speech:  Minimal         Thought Process/Content: Logical, linear        Affective Functioning: Blunted         Mood: Calm, seclusive to self, in behavioral control         Level of consciousness:  Alert, and Attentive         Response to Learning: Attentive to new learning, and Progressing to goal         Endings: None Reported      Modes of Intervention: Education, Support, Socialization, and Problem-solving    Discipline Responsible: Psychoeducational Specialist      Signature:  GRACE MCKEON

## 2024-05-09 NOTE — GROUP NOTE
Group Therapy Note    Date: 5/9/2024    Group Start Time: 1430  Group End Time: 1520  Group Topic: Cognitive Skills    [facility] Debbie Rodriguez CTRS        Group Therapy Note    Attendees: 10/17       Patient's Goal: : To increase socialization and practice self expression, and exploring positive coping   skills/resources/outlets and healthy boundaries using creative expression and discussion.           Notes:  Pt was pleasant and attentive but did not engage in task.       Status After Intervention:  Unchanged     Participation Level: Active Listener      Participation Quality:  Attentive     Speech:  Guarded      Thought Process/Content: Attentive for entire group      Affective Functioning: Blunted      Mood: Seclusive to self, guarded, did listen throughout group.      Level of consciousness:  Alert, and Attentive      Response to Learning: Attentive to new learning, and Progressing to goal      Endings: None Reported      Modes of Intervention: Education, Support, Socialization, and Problem-solving    Discipline Responsible: Psychoeducational Specialist      Signature:  GRACE MCKEON

## 2024-05-09 NOTE — PROGRESS NOTES
Daily Progress Note  5/9/2024    Patient Name: Gary Jones    CHIEF COMPLAINT: Severe episode of recurrent major depressive disorder with psychotic features         SUBJECTIVE:      Patient is seen today for a follow up assessment.  Gary was found in the milieu and he agreed to meet without privacy.  He reports an improvement in his mood, however still feels \"down\". Denies any suicidal ideation or any homicidal ideations.  He denies any paranoia, delusions, or hallucinations. He states he is able to sleep well however still finds himself waking up at 5 am. He denies any concerns with appetite.  He has been taking his medications as prescribed and notes them to be beneficial for symptoms.  He has been pleasant cooperative for staff and other patients.  He has been attending groups and enjoys socializing.  Vitals remained stable.  Plan to follow up with outpatient provider at [facility] once stable for discharge.  Medication management discharge planning per attending.        Appetite:  [x] Adequate/Unchanged  [] Increased  [] Decreased      Sleep:       [x] Adequate/Unchanged  [] Fair  [] Poor      Group Attendance on Unit:   [x] Yes   [] Selectively    [] No    Compliant with scheduled medications: [x] Yes  [] No    Received emergency medications in past 24 hrs: [] Yes   [x] No    Medication Side Effects: Denies         Mental Status Exam  Level of consciousness: Alert and awake   Appearance: Appropriate attire for setting, seated in chair, with fair  grooming and hygiene   Behavior/Motor: Approachable, engages with interviewer, no psychomotor abnormalities   Attitude toward examiner: Cooperative, attentive, good eye contact  Speech: spontaneous, normal rate, and normal volume   Mood: \"Good\"  Affect: Congruent with mood  Thought processes: linear, goal directed, and coherent   Thought content:  Denies homicidal ideation  Suicidal Ideation: Reports improvement in suicidal ideations, contracts for safety on the unit.  milieu.  Medication changes and discharge planning per attending  Follow-up daily while inpatient.     Patient continues to be monitored in the inpatient psychiatric facility at [facility] for safety and stabilization. Patient continues to need, on a daily basis, active treatment furnished directly by or requiring the supervision of inpatient psychiatric personnel.    Electronically signed by Ira Locke DPM on 5/9/2024 at 11:29 AM    **This report has been created using voice recognition software. It may contain minor errors which are inherent in voice recognition technology.**     I independently saw and evaluated the patient.  I reviewed the  documentation above    .  Any additional comments or changes to the   documentation are stated below otherwise agree with assessment.      QTc Calculation (Bazett)   Date Value Ref Range Status   04/17/2024 398 ms Final       The patient has been coming out of his room.  He has been interacting with his peers.  The patient reports an improvement in his mood.  His medications are helpful.  No changes have been made to the patient's medications today.  His expected length of stay is 1 day     PLAN  Medications as noted above  Attempt to develop insight  Psycho-education conducted.  Supportive Therapy conducted.  Follow-up daily while on inpatient unit    Electronically signed by GEOFF STOVALL MD on 5/9/24 at 8:03 PM EDT

## 2024-05-09 NOTE — PLAN OF CARE
Problem: Self Harm/Suicidality  Goal: Will have no self-injury during hospital stay  Description: INTERVENTIONS:  1.  Ensure constant observer at bedside with Q15M safety checks  2.  Maintain a safe environment  3.  Secure patient belongings  4.  Ensure family/visitors adhere to safety recommendations  5.  Ensure safety tray has been added to patient's diet order  6.  Every shift and PRN: Re-assess suicidal risk via Frequent Screener    Note: Mr. Jones denies suicidal ideation and thoughts of harm to self and others.      Problem: Psychosis  Goal: Will report no hallucinations or delusions  Description: INTERVENTIONS:  1. Administer medication as  ordered  2. Assist with reality testing to support increasing orientation  3. Assess if patient's hallucinations or delusions are encouraging self harm or harm to others and intervene as appropriate  Note: Mr. Jones denies auditory and visual hallucinations. He is not observed tending internally. Mr. Jones is calm, quiet and spends the day in the day area watching movies. He's isolative to himself, and has attended some therapeutic groups.      Problem: Sleep Disturbance  Goal: Will exhibit normal sleeping pattern  Description: INTERVENTIONS:  1. Administer medication as ordered  2. Decrease environmental stimuli, including noise, as appropriate  3. Discourage social isolation and naps during the day  Note: Mr. Jones reports he slept well last night.      Problem: Pain  Goal: Verbalizes/displays adequate comfort level or baseline comfort level  Note: Mr. Jones denies pain and discomfort during this shift.

## 2024-05-09 NOTE — PROGRESS NOTES
IN-PATIENT SERVICE  [facility]    CONSULTATION / HISTORY AND PHYSICAL EXAMINATION            Date:   5/9/2024  Patient name:  Gary Jones  Date of admission:  5/6/2024 10:55 PM  MRN:   [id number]  Account:  [id number]  YOB: 1985  PCP:    No primary care provider on file.  Room:   [address] [place] [address]  Code Status:    Full Code    Physician Requesting Consult: Quentin Lao MD    Reason for Consult:  medical management    Chief Complaint:     Chief Complaint   Patient presents with    Hallucinations     Auditory, to kill self  Also anxiety  Drank a bottle of wine       History Obtained From:     Patient medical record nursing staff    History of Present Illness:     Patient is 39-year-old male with history of HIV compliant with the medication following up with [facility], hypertension hyperlipidemia, alcohol abuse has been admitted at [facility] for further management of acute psychosis    Past Medical History:     Past Medical History:   Diagnosis Date    Depression with suicidal ideation 4/16/2024    HIV disease (HCC)         Past Surgical History:     Past Surgical History:   Procedure Laterality Date    MOUTH SURGERY Bilateral     OTHER SURGICAL HISTORY  04/27/2017    extubatio in OR    WY OFFICE/OUTPT VISIT,PROCEDURE ONLY N/A 4/27/2018    EXTUBATION OF ARTIFICIAL AIRWAY performed by Kiana Camarena MD at [facility] OR    WY OPEN TREATMENT PALATAL/MAXILLARY FRACTURE N/A 4/25/2018    ORIF SYMPHYSIS, CLOSED REDUCTION LEFT RAMUS performed by Eliseo Barrow DDS at [facility] OR        Medications Prior to Admission:     Prior to Admission medications    Medication Sig Start Date End Date Taking? Authorizing Provider   abacavir-dolutegravir-lamiVUDine (TRIUMEQ) 600- MG TABS Take 1 tablet by mouth daily 4/20/24   Emery Hardy MD   amLODIPine (NORVASC) 5 MG tablet Take 1 tablet by mouth daily 4/19/24   Emery Hardy MD   atorvastatin (LIPITOR) 20 MG tablet Take 1 tablet by mouth daily  psychotic features (HCC) [F33.3] 04/17/2024    Depression with suicidal ideation [F32.A, R45.851] 04/16/2024       Plan:     Acute psychosis to be managed by psychiatry  HIV resume antiviral  Alcohol abuse watch for withdrawal,  Macrocytosis likely secondary to underlying alcohol use, will check vitamin B12 folate  Anion Gap elevated likely second underlying alcohol use  I transaminitis likely second underlying HIV and alcohol use continue to monitor check hepatitis panel and  Hypertension patient was borderline hypertensive in the morning, on amlodipine continue to monitor    5/9  Patient seen and examined  Vitals have been stable  B12 and folate deficiency, received 1 dose of IM vitamin B12, getting replacement, getting folate replaced as well  Hepatitis panel negative vitamin  Currently denies any chest pain shortness of breath fever chills  Medicine will sign off please call with question.  Delaney Coombs MD  5/9/2024  5:28 PM    Copy sent to Dr. Wise primary care provider on file.    Please note that this chart was generated using voice recognition Dragon dictation software.  Although every effort was made to ensure the accuracy of this automated transcription, some errors in transcription may have occurred.

## 2024-05-09 NOTE — GROUP NOTE
Group Therapy Note    Date: 5/9/2024    Group Start Time: 1015  Group End Time: 1045  Group Topic: Psychoeducation    Sy Siu        Group Therapy Note    Attendees: 6/18   Patient declined to attend psychotherapy group after encouragement from staff.  1:1 talk time offered but refused.     Signature:  Sy Miller

## 2024-05-09 NOTE — PLAN OF CARE
Problem: Self Harm/Suicidality  Goal: Will have no self-injury during hospital stay  Description: INTERVENTIONS:  1.  Ensure constant observer at bedside with Q15M safety checks  2.  Maintain a safe environment  3.  Secure patient belongings  4.  Ensure family/visitors adhere to safety recommendations  5.  Ensure safety tray has been added to patient's diet order  6.  Every shift and PRN: Re-assess suicidal risk via Frequent Screener    5/8/2024 2228 by Jo Ann Burger LPN  Outcome: Progressing     Problem: Psychosis  Goal: Will report no hallucinations or delusions  Description: INTERVENTIONS:  1. Administer medication as  ordered  2. Assist with reality testing to support increasing orientation  3. Assess if patient's hallucinations or delusions are encouraging self harm or harm to others and intervene as appropriate  5/8/2024 2228 by Jo Ann Burger LPN  Outcome: Progressing     Problem: Sleep Disturbance  Goal: Will exhibit normal sleeping pattern  Description: INTERVENTIONS:  1. Administer medication as ordered  2. Decrease environmental stimuli, including noise, as appropriate  3. Discourage social isolation and naps during the day  5/8/2024 2228 by Jo Ann Burger LPN  Outcome: Progressing     Patient denies thoughts of self harm, or suicidal ideation.  15 minute safety checks continue, continuing to assess the unit for potentially hazardous contraband.  Patient continues to endorse auditory hallucinations, but states that they have \"lessened\" in intensity. Patient denies auditory hallucinations being recognizably verbal, and denies they are distressing him at this time.  Asked patient to let staff know if there are any changes.  Patient is compliant with unit behavioral guidelines, and compliant with all medication.  Patient reports he had adequate amount of sleep the previous night.

## 2024-05-10 VITALS
WEIGHT: 150 LBS | TEMPERATURE: 97.6 F | DIASTOLIC BLOOD PRESSURE: 84 MMHG | BODY MASS INDEX: 24.99 KG/M2 | HEIGHT: 65 IN | OXYGEN SATURATION: 100 % | SYSTOLIC BLOOD PRESSURE: 122 MMHG | RESPIRATION RATE: 14 BRPM | HEART RATE: 78 BPM

## 2024-05-10 PROCEDURE — 6370000000 HC RX 637 (ALT 250 FOR IP): Performed by: INTERNAL MEDICINE

## 2024-05-10 PROCEDURE — 6370000000 HC RX 637 (ALT 250 FOR IP): Performed by: PSYCHIATRY & NEUROLOGY

## 2024-05-10 PROCEDURE — 99239 HOSP IP/OBS DSCHRG MGMT >30: CPT | Performed by: PSYCHIATRY & NEUROLOGY

## 2024-05-10 RX ORDER — FOLIC ACID 1 MG/1
1 TABLET ORAL DAILY
Qty: 30 TABLET | Refills: 3 | Status: SHIPPED | OUTPATIENT
Start: 2024-05-11

## 2024-05-10 RX ORDER — ABACAVIR SULFATE, DOLUTEGRAVIR SODIUM, LAMIVUDINE 600; 50; 300 MG/1; MG/1; MG/1
1 TABLET, FILM COATED ORAL DAILY
Qty: 30 TABLET | Refills: 0 | Status: SHIPPED | OUTPATIENT
Start: 2024-05-10

## 2024-05-10 RX ORDER — FLUOXETINE HYDROCHLORIDE 20 MG/1
20 CAPSULE ORAL DAILY
Qty: 30 CAPSULE | Refills: 3 | Status: SHIPPED | OUTPATIENT
Start: 2024-05-10

## 2024-05-10 RX ORDER — TRAZODONE HYDROCHLORIDE 50 MG/1
50 TABLET ORAL NIGHTLY PRN
Qty: 30 TABLET | Refills: 0 | Status: SHIPPED | OUTPATIENT
Start: 2024-05-10

## 2024-05-10 RX ORDER — M-VIT,TX,IRON,MINS/CALC/FOLIC 27MG-0.4MG
1 TABLET ORAL DAILY
Qty: 30 TABLET | Refills: 0 | Status: SHIPPED | OUTPATIENT
Start: 2024-05-11

## 2024-05-10 RX ORDER — AMLODIPINE BESYLATE 5 MG/1
5 TABLET ORAL DAILY
Qty: 30 TABLET | Refills: 0 | Status: SHIPPED | OUTPATIENT
Start: 2024-05-10

## 2024-05-10 RX ORDER — OLANZAPINE 15 MG/1
15 TABLET ORAL NIGHTLY
Qty: 30 TABLET | Refills: 0 | Status: SHIPPED | OUTPATIENT
Start: 2024-05-10

## 2024-05-10 RX ORDER — ATORVASTATIN CALCIUM 20 MG/1
20 TABLET, FILM COATED ORAL NIGHTLY
Qty: 30 TABLET | Refills: 3 | Status: SHIPPED | OUTPATIENT
Start: 2024-05-10

## 2024-05-10 RX ORDER — THIAMINE MONONITRATE (VIT B1) 100 MG
100 TABLET ORAL DAILY
Qty: 30 TABLET | Refills: 0 | Status: SHIPPED | OUTPATIENT
Start: 2024-05-11

## 2024-05-10 RX ADMIN — THIAMINE HCL TAB 100 MG 100 MG: 100 TAB at 08:47

## 2024-05-10 RX ADMIN — CYANOCOBALAMIN TAB 1000 MCG 1000 MCG: 1000 TAB at 08:47

## 2024-05-10 RX ADMIN — Medication 1 TABLET: at 08:47

## 2024-05-10 RX ADMIN — FLUOXETINE 20 MG: 20 CAPSULE ORAL at 08:47

## 2024-05-10 RX ADMIN — ABACAVIR 600 MG: 300 TABLET ORAL at 08:46

## 2024-05-10 RX ADMIN — LAMIVUDINE 300 MG: 100 TABLET, FILM COATED ORAL at 08:46

## 2024-05-10 RX ADMIN — AMLODIPINE BESYLATE 5 MG: 5 TABLET ORAL at 08:47

## 2024-05-10 RX ADMIN — DOLUTEGRAVIR SODIUM 50 MG: 50 TABLET, FILM COATED ORAL at 08:47

## 2024-05-10 RX ADMIN — FOLIC ACID 1 MG: 1 TABLET ORAL at 08:47

## 2024-05-10 NOTE — DISCHARGE SUMMARY
DISCHARGE SUMMARY      Patient ID:  Gary Jones  [id number]  39 y.o.  1985    Admit date: 5/6/2024    Discharge date and time: 5/10/2024    Disposition: Home     Admitting Physician: Quentin Lao MD     Discharge Physician: Dr AUSTIN Lao MD    Admission Diagnoses: Suicidal ideation [R45.851]  Auditory hallucinations [R44.0]  Depression with suicidal ideation [F32.A, R45.851]    Admission Condition: poor    Discharged Condition: stable    Admission Circumstance: Gary Jones is a 39 y.o. male who has a past medical history of HIV and mental illness. Patient presented to the ED via  [facility] reporting increased suicidal ideation to cut himself.  Patient was vague regarding recent stressors or events leading to suicidal thoughts.  Patient reported that 3 weeks ago he attempted to cut himself as well.  Patient attempted to be admitted at [facility], but was denied due to being a registered sex offender.  This is patient's first admission to [facility].     Patient was seen for initial evaluation today.  He is currently on a one-to-one observation for safety due to use of walking boot for foot fracture.  Patient was resting in bed on approach but awake.  He was difficult to engage in sustained conversation and presented as withdrawn and irritable.  He mostly stared up at the ceiling and made no eye contact with writer.  Patient is evasive regarding current events which may have led to increasing depression and suicidal thoughts.  He endorsed life stressors but did not elaborate.  Patient endorses a prior history of depression and \"schizophrenia\".  He was unable to recall names of current medications.  He would like an antidepressant medication to be added to his regimen.  Patient is endorsing increasing symptoms of depression for the past year.  He endorses little motivation to improve his life situation, anhedonia, feelings of hopelessness, helplessness, and worthlessness, decreased energy and poor concentration, difficulty  the last 72 hours.  Lab Results   Component Value Date/Time    BARBSCNU NEGATIVE 04/16/2024 01:07 PM    LABBENZ NEGATIVE 04/16/2024 01:07 PM    LABMETH NEGATIVE 04/16/2024 01:07 PM    PPXUR NOT REPORTED 04/22/2018 09:11 PM     Lab Results   Component Value Date/Time    TSH 2.03 04/18/2024 07:54 AM     No results found for: \"LITHIUM\"  No results found for: \"VALPROATE\", \"CBMZ\"    RISK ASSESSMENT AT DISCHARGE: Low risk for suicide and homicide.     Treatment Plan:  Reviewed current Medications with the patient. Education provided on the complaince with treatment.    Risks, benefits, side effects, drug-to-drug interactions and alternatives to treatment were discussed.    Encourage patient to attend outpatient follow up appointment and therapy.    Patient was advised to call the outpatient provider, visit the nearest ED or call 911 if symptoms are not manageable.        Medication List        START taking these medications      folic acid 1 MG tablet  Commonly known as: FOLVITE  Take 1 tablet by mouth daily  Start taking on: May 11, 2024     therapeutic multivitamin-minerals tablet  Take 1 tablet by mouth daily  Start taking on: May 11, 2024     vitamin B-1 100 MG tablet  Commonly known as: THIAMINE  Take 1 tablet by mouth daily  Start taking on: May 11, 2024            CHANGE how you take these medications      atorvastatin 20 MG tablet  Commonly known as: LIPITOR  Take 1 tablet by mouth at bedtime  What changed: when to take this            CONTINUE taking these medications      amLODIPine 5 MG tablet  Commonly known as: NORVASC  Take 1 tablet by mouth daily     cyanocobalamin 1000 MCG tablet  Take 1 tablet by mouth daily     FLUoxetine 20 MG capsule  Commonly known as: PROZAC  Take 1 capsule by mouth daily     OLANZapine 15 MG tablet  Commonly known as: ZYPREXA  Take 1 tablet by mouth nightly     traZODone 50 MG tablet  Commonly known as: DESYREL  Take 1 tablet by mouth nightly as needed for Sleep     Triumeq

## 2024-05-10 NOTE — BH NOTE
[facility]  Admission Note     Admission Type:   Admission Type: Voluntary    Reason for admission:  Reason for Admission: Patient reports command hallucinations telling him to kill himself, seeing \"shadows of the devil\". Has been drinking 4-5 times weekly with increase in suicidal thoughts with no plan.      Addictive Behavior:   Addictive Behavior  In the Past 3 Months, Have You Felt or Has Someone Told You That You Have a Problem With  : None    Medical Problems:   Past Medical History:   Diagnosis Date    Depression with suicidal ideation 4/16/2024    HIV disease ([facility])        Status EXAM:  Mental Status and Behavioral Exam  Normal: No  Level of Assistance: Independent/Self  Facial Expression: Sad, Flat  Affect: Congruent  Level of Consciousness: Alert  Frequency of Checks: 4 times per hour, close  Mood:Normal: No  Mood: Anxious, Depressed, Helpless, Sad  Motor Activity:Normal: No  Motor Activity: Decreased  Eye Contact: Fair  Observed Behavior: Friendly, Cooperative, Preoccupied  Sexual Misconduct History: Current - no  Preception: [place] to person, [place] to time, [place] to place, [place] to situation  Attention:Normal: No  Attention: Distractible  Thought Processes: Circumstantial  Thought Content:Normal: No  Thought Content: Preoccupations  Depression Symptoms: Appetite change, Change in energy level, Sleep disturbance, Feelings of helplessness, Feelings of hopelessess, Feelings of worthlessness, Isolative, Loss of interest  Anxiety Symptoms: Generalized  Shanae Symptoms: No problems reported or observed.  Hallucinations: Auditory (comment), Visual (comment) (Voices telling him to kill self/Seeing \"Shadows of the devil\")  Delusions: Yes  Delusions: Obsessions  Memory:Normal: Yes  Memory: Poor recent, Poor remote  Insight and Judgment: No  Insight and Judgment: Poor judgment, Poor insight    Tobacco Screening:  Practical Counseling, on admission, harriet X, if applicable and completed (first 3 are 
Discharge OQ completed and patient discharged from OQ system.  
Patient arrived on the unit at this time via wheelchair and accompanied by 2 staff.   
Patient given tobacco quitline number 38094671494 at this time, refusing to call at this time, states \" I just dont want to quit now\"- patient given information as to the dangers of long term tobacco use. Continue to reinforce the importance of tobacco cessation.     
Patient given tobacco quitline number 73394513679 at this time, refusing to call at this time, states \" I just dont want to quit now\"- patient given information as to the dangers of long term tobacco use. Continue to reinforce the importance of tobacco cessation.    
ZI faxed to [facility]  
quitting, techniques in how to quit, available resources  ( ) [facility]                                                                                                                   ( ) Patient refused counseling  ( x) Patient refused referral  ( x) Patient refused prescription upon discharge  ( ) Patient has not smoked in the last 30 days    Metabolic Screening:    Lab Results   Component Value Date    LABA1C 5.6 04/18/2024       Lab Results   Component Value Date    CHOL 122 04/18/2024     Lab Results   Component Value Date    TRIG 104 04/18/2024    TRIG 83 04/27/2018     Lab Results   Component Value Date    HDL 56 04/18/2024     No components found for: \"LDLCAL\"  No components found for: \"LABVLDL\"    Mr. Jones discharged to home via black and white cab. All personal belongings from room, bin, locker and security were sent home with patient at time of discharge.   MONA PEACE RN

## 2024-05-10 NOTE — TRANSITION OF CARE
Behavioral Health Transition Record to Provider    Patient Name: Gary Jones  YOB: 1985   Medical Record Number: [id number]  Date of Admission: 5/6/2024 10:55 PM   Date of Discharge: 5/10/2024    Attending Provider: Quentin Lao MD   Discharging Provider: Dr. Lao  To contact this individual call [phone] and ask the  to page.  If unavailable, ask to be transferred to Behavioral Health Provider on call.  A Behavioral Health Provider will be available on call 24/7 and during holidays.    Primary Care Provider: No primary care provider on file.    No Known Allergies    Reason for Admission: Patient reported command auditory hallucinations to kill self and reported seeing shadows of the devil.    Admission Diagnosis: Suicidal ideation [R45.851]  Auditory hallucinations [R44.0]  Depression with suicidal ideation [F32.A, R45.851]    * No surgery found *    Results for orders placed or performed during the hospital encounter of 05/06/24   CBC with Auto Differential   Result Value Ref Range    WBC 5.7 3.5 - 11.0 k/uL    RBC 4.04 (L) 4.5 - 5.9 m/uL    Hemoglobin 13.7 13.5 - 17.5 g/dL    Hematocrit 40.5 (L) 41 - 53 %    .2 (H) 80 - 100 fL    MCH 34.0 26 - 34 pg    MCHC 33.9 31 - 37 g/dL    RDW 14.6 11.5 - 14.9 %    Platelets 336 150 - 450 k/uL    MPV 6.8 6.0 - 12.0 fL    Neutrophils % 36 36 - 66 %    Lymphocytes % 58 (H) 24 - 44 %    Atypical Lymphocytes 1 %    Monocytes % 3 1 - 7 %    Eosinophils % 2 0 - 4 %    Basophils % 0 0 - 2 %    Neutrophils Absolute 2.05 1.3 - 9.1 k/uL    Lymphocytes Absolute 3.31 1.0 - 4.8 k/uL    Atypical Lymphocytes Absolute 0.06 k/uL    Monocytes Absolute 0.17 0.1 - 1.3 k/uL    Eosinophils Absolute 0.11 0.0 - 0.4 k/uL    Basophils Absolute 0.00 0.0 - 0.2 k/uL    Morphology 1+ POLYCHROMASIA    CMP   Result Value Ref Range    Sodium 139 135 - 144 mmol/L    Potassium 4.1 3.7 - 5.3 mmol/L    Chloride 104 98 - 107 mmol/L    CO2 17 (L) 20 - 31 mmol/L    Anion Gap

## 2024-05-10 NOTE — DISCHARGE INSTRUCTIONS
Information:  Medications:   Medication summary provided   I understand that I should take only the medications on my list.     -why and when I need to take each medicine.     -which side effects to watch for.     -that I should carry my medication information at all times in case of     Emergency situations.    I will take all of my medicines to follow up appointments.     -check with my physician or pharmacist before taking any new    Medication, over the counter product or drink alcohol.    -Ask about food, drug or dietary supplement interactions.    -discard old lists and update records with medication providers.    Notify Physician:  Notify physician if you notice:   Always call 911 if you feel your life is in danger  In case of an emergency call 911 immediately!  If 911 is not available call your local emergency medical system for help    Behavioral Health Follow Up:  Original Referral Source: [facility]/Progress toward goals: Adherence to medication and follow up appointments  Recommendations for Level of Care: Medication and follow up appointments  Patient status at discharge: Alert and oriented x 4  My hospital  was: Sy  Aftercare plan faxed: Lia   -faxed by: Linda LARSON   -date: 5/10/2024   -time: 2:30 pm  Prescriptions: Prescriptions were filled at hospital outpatient pharmacy and sent with patient upon discharge.     Smoking: Quit Smoking.   Call the [employer]'s smoking quitline at 7-958-19T-QUIT  Know the signs of a heart attack   If you have any of the following symptoms call 911 immediately, do not wait more    Than five minutes.    1. Pressure, fullness and/ or squeezing in the center of the chest spreading to    The jaw, neck or shoulder.    2. Chest discomfort with light headedness, fainting, sweating, nausea or    Shortness of breath.   3. Upper abdominal pressure or discomfort.   4. Lower chest pain, back pain, unusual fatigue, shortness of breath, nausea   Or

## 2024-05-10 NOTE — GROUP NOTE
Group Therapy Note    Date: 5/10/2024    Group Start Time: 1330  Group End Time: 1415  Group Topic: Relaxation    [facility] Carla Deng CTRS    Relaxation Group Note        Date: May 10, 2024 Start Time: 1:30pm  End Time:  215pm      Number of Participants in Group & Unit Census:  11/19    Topic: 11/19    Goal of Group:pt will identify benefits of using art for leisure and coping       Comments:     Patient did not participate in Relaxation group, despite staff encouragement and explanation of benefits.  Patient remain seclusive to self.  Q15 minute safety checks maintained for patient safety and will continue to encourage patient to attend unit programming.              Signature:  GRACE REZA

## 2024-05-10 NOTE — GROUP NOTE
Group Therapy Note    Date: 5/10/2024    Group Start Time: 1015  Group End Time: 1045  Group Topic: Psychoeducation    Sy Siu        Group Therapy Note    Attendees: 10/19       Patient's Goal:  PT will participate actively in group discussion using self esteem cards.     Notes:  Patient is making progress, AEB participating in group discussion, actively listening, and supporting other group members.  PT participates in group and encourages others to participate     Status After Intervention:  Improved    Participation Level: Active Listener and Interactive    Participation Quality: Appropriate, Attentive, and Sharing      Speech:  normal      Thought Process/Content: Logical      Affective Functioning: Flat      Mood: depressed      Level of consciousness:  Alert, Oriented x4, and Attentive      Response to Learning: Able to verbalize/acknowledge new learning and Progressing to goal      Endings: None Reported    Modes of Intervention: Education, Support, and Socialization      Discipline Responsible: /Counselor      Signature:  Sy Miller

## 2024-05-10 NOTE — PROGRESS NOTES
CLINICAL PHARMACY NOTE: MEDS TO BEDS    Total # of Prescriptions Filled: 7   The following medications were delivered to the patient:  Vitamin B-12 1000mcg  Thiamine Mono 100mg  Multivitamin  Olanzapine 15mg  Folic Acid 1mg  Atorvastatin 20mg  Amlodipine 5mg    Additional Documentation: delivered to KELLI Parada 5/10/24 4:15pm kbg    -Triumeq on file at [facility] [phone] [address]   -Trazodone too soon to fill on file  -Prozac too soon to fill on file

## 2024-05-10 NOTE — PLAN OF CARE
Problem: Self Harm/Suicidality  Goal: Will have no self-injury during hospital stay  Description: INTERVENTIONS:  1.  Ensure constant observer at bedside with Q15M safety checks  2.  Maintain a safe environment  3.  Secure patient belongings  4.  Ensure family/visitors adhere to safety recommendations  5.  Ensure safety tray has been added to patient's diet order  6.  Every shift and PRN: Re-assess suicidal risk via Frequent Screener    5/10/2024 0340 by Eunice Perkins LPN  Outcome: Progressing  Note: Patient denies thoughts of self-harm or harm to others during this shift. Staff encouraged patient to notify staff if thoughts of self-harm or harm to others occur. Staff ensure patient safety by intermediate checks for safety every 15 minutes.          Problem: Psychosis  Goal: Will report no hallucinations or delusions  Description: INTERVENTIONS:  1. Administer medication as  ordered  2. Assist with reality testing to support increasing orientation  3. Assess if patient's hallucinations or delusions are encouraging self harm or harm to others and intervene as appropriate  5/10/2024 0340 by Eunice Perkins LPN  Outcome: Progressing  Note: Patient denies delusions and hallucinations at this time. Staff ensure safety by providing safety checks on the unit intermittently and every 15 minutes. Staff continue to provide a safe environment. Patient is encouraged to notify staff if delusions or hallucinations occurs.         Problem: Sleep Disturbance  Goal: Will exhibit normal sleeping pattern  Description: INTERVENTIONS:  1. Administer medication as ordered  2. Decrease environmental stimuli, including noise, as appropriate  3. Discourage social isolation and naps during the day  5/10/2024 0340 by Eunice Perkins LPN  Outcome: Progressing  Note: Patient displays a normal sleeping pattern this shift.  Staff ensure safety by providing safety checks on the unit intermittently and every 15 minutes.        Problem:

## 2024-05-10 NOTE — GROUP NOTE
Group Therapy Note    Date: 5/10/2024    Group Start Time: 1100  Group End Time: 1130  Group Topic: Cognitive Skills    Carla Buchanan CTRS      Cognitive Skills Group Note        Date: May 10, 2024 Start Time: 11am  End Time: 11:30am      Number of Participants in Group & Unit Census:  7/19    Topic: cognitive skills     Goal of Group: pt will demonstrate improved coping skills and improved communication skills       Comments:     Patient did not participate in Cognitive Skills group, despite staff encouragement and explanation of benefits.  Patient remain seclusive to self.  Q15 minute safety checks maintained for patient safety and will continue to encourage patient to attend unit programming.       Signature:  GRACE REZA

## 2024-05-13 NOTE — CARE COORDINATION
Name: Gary Jones    : 1985    Auth number: [id number]     Discharge Date: 5/10/2024    Destination: home    *If you have any specific discharge questions, please contact the assigned /discharge planner: Sy [phone]      Discharge Medications:      Medication List        START taking these medications      folic acid 1 MG tablet  Commonly known as: FOLVITE  Take 1 tablet by mouth daily  Notes to patient: Supplement     therapeutic multivitamin-minerals tablet  Take 1 tablet by mouth daily  Notes to patient: Supplement     vitamin B-1 100 MG tablet  Commonly known as: THIAMINE  Take 1 tablet by mouth daily  Notes to patient: Supplement            CHANGE how you take these medications      atorvastatin 20 MG tablet  Commonly known as: LIPITOR  Take 1 tablet by mouth at bedtime  What changed: when to take this  Notes to patient: Cholesterol            CONTINUE taking these medications      amLODIPine 5 MG tablet  Commonly known as: NORVASC  Take 1 tablet by mouth daily  Notes to patient: Blood pressure     cyanocobalamin 1000 MCG tablet  Take 1 tablet by mouth daily  Notes to patient: Supplement     FLUoxetine 20 MG capsule  Commonly known as: PROZAC  Take 1 capsule by mouth daily  Notes to patient: Depression     OLANZapine 15 MG tablet  Commonly known as: ZYPREXA  Take 1 tablet by mouth nightly  Notes to patient: Clear thoughts     traZODone 50 MG tablet  Commonly known as: DESYREL  Take 1 tablet by mouth nightly as needed for Sleep  Notes to patient: Sleep aid     Triumeq 600- MG Tabs  Generic drug: abacavir-dolutegravir-lamiVUDine  Take 1 tablet by mouth daily  Notes to patient: Antiviral            STOP taking these medications      triamcinolone 0.1 % cream  Commonly known as: KENALOG               Where to Get Your Medications        These medications were sent to [id number] - [address] -  [phone] -  [phone]  [address] [place] [address]

## 2024-05-25 ENCOUNTER — APPOINTMENT (OUTPATIENT)
Dept: GENERAL RADIOLOGY | Age: 39
End: 2024-05-25
Payer: COMMERCIAL

## 2024-05-25 ENCOUNTER — HOSPITAL ENCOUNTER (EMERGENCY)
Age: 39
Discharge: HOME | End: 2024-05-25
Attending: EMERGENCY MEDICINE
Payer: COMMERCIAL

## 2024-05-25 ENCOUNTER — APPOINTMENT (OUTPATIENT)
Dept: CT IMAGING | Age: 39
End: 2024-05-25
Payer: COMMERCIAL

## 2024-05-25 VITALS
DIASTOLIC BLOOD PRESSURE: 93 MMHG | OXYGEN SATURATION: 94 % | TEMPERATURE: 98.5 F | RESPIRATION RATE: 12 BRPM | SYSTOLIC BLOOD PRESSURE: 120 MMHG | HEART RATE: 93 BPM

## 2024-05-25 DIAGNOSIS — S82.132A CLOSED FRACTURE OF MEDIAL PORTION OF LEFT TIBIAL PLATEAU, INITIAL ENCOUNTER: Primary | ICD-10-CM

## 2024-05-25 PROCEDURE — 6370000000 HC RX 637 (ALT 250 FOR IP)

## 2024-05-25 PROCEDURE — 73700 CT LOWER EXTREMITY W/O DYE: CPT

## 2024-05-25 PROCEDURE — 73562 X-RAY EXAM OF KNEE 3: CPT

## 2024-05-25 PROCEDURE — 99284 EMERGENCY DEPT VISIT MOD MDM: CPT

## 2024-05-25 PROCEDURE — 73590 X-RAY EXAM OF LOWER LEG: CPT

## 2024-05-25 RX ORDER — NAPROXEN 500 MG/1
500 TABLET ORAL 2 TIMES DAILY WITH MEALS
Qty: 28 TABLET | Refills: 0 | Status: SHIPPED | OUTPATIENT
Start: 2024-05-25

## 2024-05-25 RX ORDER — GABAPENTIN 100 MG/1
300 CAPSULE ORAL 3 TIMES DAILY
Qty: 100 CAPSULE | Refills: 0 | Status: SHIPPED | OUTPATIENT
Start: 2024-05-25 | End: 2024-06-05

## 2024-05-25 RX ORDER — OXYCODONE HYDROCHLORIDE 5 MG/1
5-10 TABLET ORAL
Qty: 50 TABLET | Refills: 0 | Status: SHIPPED | OUTPATIENT
Start: 2024-05-25 | End: 2024-06-01

## 2024-05-25 RX ORDER — CYCLOBENZAPRINE HCL 10 MG
10 TABLET ORAL 3 TIMES DAILY PRN
Qty: 50 TABLET | Refills: 0 | Status: SHIPPED | OUTPATIENT
Start: 2024-05-25

## 2024-05-25 RX ORDER — IBUPROFEN 800 MG/1
800 TABLET ORAL ONCE
Status: COMPLETED | OUTPATIENT
Start: 2024-05-25 | End: 2024-05-25

## 2024-05-25 RX ORDER — ACETAMINOPHEN 500 MG
1000 TABLET ORAL ONCE
Status: COMPLETED | OUTPATIENT
Start: 2024-05-25 | End: 2024-05-25

## 2024-05-25 RX ORDER — ACETAMINOPHEN 500 MG
1000 TABLET ORAL EVERY 6 HOURS PRN
Qty: 112 TABLET | Refills: 0 | Status: SHIPPED | OUTPATIENT
Start: 2024-05-25

## 2024-05-25 RX ADMIN — ACETAMINOPHEN 1000 MG: 500 TABLET ORAL at 02:58

## 2024-05-25 RX ADMIN — IBUPROFEN 800 MG: 800 TABLET, FILM COATED ORAL at 02:17

## 2024-05-25 ASSESSMENT — ENCOUNTER SYMPTOMS
SHORTNESS OF BREATH: 0
NAUSEA: 0
VOMITING: 0
ABDOMINAL PAIN: 0

## 2024-05-25 ASSESSMENT — PAIN SCALES - GENERAL: PAINLEVEL_OUTOF10: 10

## 2024-05-25 ASSESSMENT — LIFESTYLE VARIABLES
HOW MANY STANDARD DRINKS CONTAINING ALCOHOL DO YOU HAVE ON A TYPICAL DAY: 3 OR 4
HOW OFTEN DO YOU HAVE A DRINK CONTAINING ALCOHOL: 2-4 TIMES A MONTH

## 2024-05-25 ASSESSMENT — PAIN - FUNCTIONAL ASSESSMENT: PAIN_FUNCTIONAL_ASSESSMENT: 0-10

## 2024-05-25 NOTE — DISCHARGE INSTRUCTIONS
Orthopaedic Instructions:  -Weight bearing status: Non weight bearing with the left leg  -Wear knee immobilizer at all times; ok to remove for hygiene  -Always look for signs of compartment syndrome: pain out of proportion to the injury, pain not controlled with pain medication, numbness in digits, changing of color of digits (paleness). If these signs occur return to ED immediately for reassessment.  -Ice (20 minutes on and off 1 hour) and elevate above the level of the heart to reduce swelling and throbbing pain.  -Wean off narcotics (percocet/norco) as soon as possible. Do not take tylenol if still taking narcotics.  -Follow up with Dr. Fernandez in his office 10-14 days after injury. Call [phone] to schedule/confirm or with any questions/concerns.
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
## 2024-05-25 NOTE — ED PROVIDER NOTES
[facility]     Emergency Department     Faculty Attestation    I performed a history and physical examination of the patient and discussed management with the resident. I have reviewed and agree with the resident’s findings including all diagnostic interpretations, and treatment plans as written. Any areas of disagreement are noted on the chart. I was personally present for the key portions of any procedures. I have documented in the chart those procedures where I was not present during the key portions. I have reviewed the emergency nurses triage note. I agree with the chief complaint, past medical history, past surgical history, allergies, medications, social and family history as documented unless otherwise noted below. Documentation of the HPI, Physical Exam and Medical Decision Making performed by marlene is based on my personal performance of the HPI, PE and MDM. For Physician Assistant/ Nurse Practitioner cases/documentation I have personally evaluated this patient and have completed at least one if not all key elements of the E/M (history, physical exam, and MDM). Additional findings are as noted.    Note Started: 2:16 AM EDT     38 yo M L knee injury, no head or neck injury,   PE vss gcs 15 tender left anterior knee, no deformity, no left thigh tenderness no left calf tenderness, neurovascular intact left lower extremity,    Ct > tibial plateau fx / ortho consult   Seen by orthopedics, patient cleared for discharge home /knee immobilizer    EKG Interpretation    Interpreted by me      CRITICAL CARE: There was a high probability of clinically significant/life threatening deterioration in this patient's condition which required my urgent intervention.  Total critical care time was 0 minutes.  This excludes any time for separately reportable procedures.       Desean Cruz DO  05/25/24 0216       Desean Cratagena DO  05/25/24

## 2024-06-20 RX ORDER — NAPROXEN 500 MG/1
500 TABLET ORAL 2 TIMES DAILY WITH MEALS
Qty: 28 TABLET | Refills: 0 | OUTPATIENT
Start: 2024-06-20

## 2024-06-20 NOTE — TELEPHONE ENCOUNTER
Refill   naproxen (NAPROSYN) 500 MG tablet   oxyCODONE (ROXICODONE) 5 MG immediate release tablet ()     [facility]

## 2024-07-10 ENCOUNTER — TELEPHONE (OUTPATIENT)
Dept: ORTHOPEDIC SURGERY | Age: 39
End: 2024-07-10

## 2024-07-10 NOTE — TELEPHONE ENCOUNTER
Received call from patient requesting refill of his     Flexeril - has 2 pills left  Gabapentin - has 9 pills left  Oxycodone - completely out of    Please call into [facility] on [address].    Please advise.    Call back#: [phone]

## 2024-07-10 NOTE — TELEPHONE ENCOUNTER
Left message to let patient know that he has not been seen in our clinic and that no medication will be prescribed until he is seen

## 2024-07-12 DIAGNOSIS — S82.132A CLOSED FRACTURE OF MEDIAL PORTION OF LEFT TIBIAL PLATEAU, INITIAL ENCOUNTER: ICD-10-CM

## 2024-07-12 RX ORDER — GABAPENTIN 100 MG/1
CAPSULE ORAL
Qty: 100 CAPSULE | Refills: 0 | OUTPATIENT
Start: 2024-07-12

## 2024-07-12 RX ORDER — OXYCODONE HYDROCHLORIDE 5 MG/1
TABLET ORAL
Qty: 50 TABLET | OUTPATIENT
Start: 2024-07-12

## 2024-07-12 RX ORDER — CYCLOBENZAPRINE HCL 10 MG
10 TABLET ORAL 3 TIMES DAILY PRN
Qty: 50 TABLET | Refills: 0 | OUTPATIENT
Start: 2024-07-12

## 2024-07-17 ENCOUNTER — OFFICE VISIT (OUTPATIENT)
Dept: ORTHOPEDIC SURGERY | Age: 39
End: 2024-07-17

## 2024-07-17 DIAGNOSIS — R52 PAIN: Primary | ICD-10-CM

## 2024-07-17 DIAGNOSIS — S82.132A CLOSED FRACTURE OF MEDIAL PORTION OF LEFT TIBIAL PLATEAU, INITIAL ENCOUNTER: ICD-10-CM

## 2024-07-17 RX ORDER — CYCLOBENZAPRINE HCL 10 MG
10 TABLET ORAL 3 TIMES DAILY PRN
Qty: 50 TABLET | Refills: 0 | Status: SHIPPED | OUTPATIENT
Start: 2024-07-17

## 2024-07-17 RX ORDER — GABAPENTIN 100 MG/1
300 CAPSULE ORAL 3 TIMES DAILY
Qty: 100 CAPSULE | Refills: 0 | Status: SHIPPED | OUTPATIENT
Start: 2024-07-17 | End: 2024-07-28

## 2024-07-17 RX ORDER — NAPROXEN 500 MG/1
500 TABLET ORAL 2 TIMES DAILY WITH MEALS
Qty: 28 TABLET | Refills: 0 | Status: SHIPPED | OUTPATIENT
Start: 2024-07-17

## 2024-07-17 RX ORDER — ACETAMINOPHEN 500 MG
1000 TABLET ORAL EVERY 6 HOURS PRN
Qty: 112 TABLET | Refills: 0 | Status: SHIPPED | OUTPATIENT
Start: 2024-07-17

## 2024-07-22 DIAGNOSIS — S82.132A CLOSED FRACTURE OF MEDIAL PORTION OF LEFT TIBIAL PLATEAU, INITIAL ENCOUNTER: ICD-10-CM

## 2024-07-22 RX ORDER — OXYCODONE HYDROCHLORIDE 5 MG/1
5-10 TABLET ORAL
Qty: 50 TABLET | Refills: 0 | Status: CANCELLED | OUTPATIENT
Start: 2024-07-22 | End: 2024-07-29

## 2024-07-25 DIAGNOSIS — S82.132A CLOSED FRACTURE OF MEDIAL PORTION OF LEFT TIBIAL PLATEAU, INITIAL ENCOUNTER: ICD-10-CM

## 2024-07-25 RX ORDER — OXYCODONE HYDROCHLORIDE 5 MG/1
5 TABLET ORAL
Qty: 18 TABLET | Refills: 0 | Status: SHIPPED | OUTPATIENT
Start: 2024-07-25 | End: 2024-08-16 | Stop reason: SDUPTHER

## 2024-07-25 NOTE — TELEPHONE ENCOUNTER
Minimally displaced left tibial plateau fracture      Refill request pended.      Routing to last provider/on call

## 2024-07-25 NOTE — TELEPHONE ENCOUNTER
Patient called for refill on oxyCODONE (ROXICODONE) 5 MG immediate release tablet   Please advise  Thank you

## 2024-08-16 DIAGNOSIS — S82.132A CLOSED FRACTURE OF MEDIAL PORTION OF LEFT TIBIAL PLATEAU, INITIAL ENCOUNTER: ICD-10-CM

## 2024-08-16 RX ORDER — OXYCODONE HYDROCHLORIDE 5 MG/1
5 TABLET ORAL
Qty: 18 TABLET | Refills: 0 | Status: SHIPPED | OUTPATIENT
Start: 2024-08-16 | End: 2024-08-19

## 2024-08-16 NOTE — TELEPHONE ENCOUNTER
Minimally displaced left tibial plateau fracture      Refill request pended.      Routing to last provider AND ON CALL

## 2024-08-26 DIAGNOSIS — S82.132A CLOSED FRACTURE OF MEDIAL PORTION OF LEFT TIBIAL PLATEAU, INITIAL ENCOUNTER: ICD-10-CM

## 2024-08-26 NOTE — TELEPHONE ENCOUNTER
DOI 5/25/24   left nondisplaced tibial plateau fracture.   Patient requesting refill on oxycodone. Medication pended. Please advise

## 2024-08-27 NOTE — TELEPHONE ENCOUNTER
Patient called for update refill  Advised refills can take up to 72 hours to be processed  Please advise

## 2024-08-28 RX ORDER — OXYCODONE HYDROCHLORIDE 5 MG/1
5 TABLET ORAL
Qty: 18 TABLET | Refills: 0 | OUTPATIENT
Start: 2024-08-28 | End: 2024-08-31

## 2024-08-28 NOTE — TELEPHONE ENCOUNTER
Patient is following up on his refill request and is asking for  return call to advise.    Thank you.

## 2024-08-29 RX ORDER — OXYCODONE HYDROCHLORIDE 5 MG/1
5 TABLET ORAL
Qty: 18 TABLET | Refills: 0 | OUTPATIENT
Start: 2024-08-29 | End: 2024-09-01

## 2024-08-29 NOTE — TELEPHONE ENCOUNTER
Patient is following up on previous Medication refill request. He states he has not heard back from the office and he did reach out to the pharmacy.    Thank you.

## 2024-08-29 NOTE — TELEPHONE ENCOUNTER
Pt called again to request an update on his refill request.    Pharmacy is [facility] on [address].    Please contact him to advise.

## 2024-09-04 ENCOUNTER — OFFICE VISIT (OUTPATIENT)
Dept: ORTHOPEDIC SURGERY | Age: 39
End: 2024-09-04
Payer: COMMERCIAL

## 2024-09-04 VITALS — BODY MASS INDEX: 24.96 KG/M2 | HEIGHT: 65 IN

## 2024-09-04 DIAGNOSIS — S82.132A CLOSED FRACTURE OF MEDIAL PORTION OF LEFT TIBIAL PLATEAU, INITIAL ENCOUNTER: Primary | ICD-10-CM

## 2024-09-04 PROCEDURE — G8427 DOCREV CUR MEDS BY ELIG CLIN: HCPCS | Performed by: ORTHOPAEDIC SURGERY

## 2024-09-04 PROCEDURE — 4004F PT TOBACCO SCREEN RCVD TLK: CPT | Performed by: ORTHOPAEDIC SURGERY

## 2024-09-04 PROCEDURE — G8420 CALC BMI NORM PARAMETERS: HCPCS | Performed by: ORTHOPAEDIC SURGERY

## 2024-09-04 PROCEDURE — 99213 OFFICE O/P EST LOW 20 MIN: CPT | Performed by: ORTHOPAEDIC SURGERY

## 2024-09-04 RX ORDER — IBUPROFEN 800 MG/1
800 TABLET, FILM COATED ORAL 2 TIMES DAILY PRN
Qty: 60 TABLET | Refills: 0 | Status: SHIPPED | OUTPATIENT
Start: 2024-09-04

## 2024-10-28 NOTE — PROGRESS NOTES
"Group Topic: Self Esteem   Group Date: 10/28/2024  Start Time: 1600  End Time: 1700  Facilitators: Osmin Tellez   Department: [facility] [place] [address] [facility]    Number of Participants: 5   Group Focus: self-esteem  Treatment Modality: Psychoeducation  Interventions utilized were assignment and exploration  Purpose: communication skills and self-worth    MHW gave Pt a worksheet asking for them to name 3 examples for each of the following topics - \"Things I'm Good At, Compliments I've Received, What I Like About My Appearance, Challenges I Have Overcome, I Have Helped Others By, Things That Make Me Unique, What I Value The Most, Times I Have Made Others Happy\".  Pt was expected to complete this worksheet and keep it as a possible tool for the next activity.    After completing the first activity, MHW gave Pt a set of instructions that read -      \"Often times, the person that we know ourselves to be is not the person that we show to the people around us.  We present ourselves how we would like to be perceived, whether or not that is truly who we are or how we feel about ourselves.  For this project, you will receive two papers featuring blank faces.  You are to draw yourself on each.     On one page, label the top “outside” and put your name on it.  This page will be how you think you look, act, and feel on the outside.  This is the 'you' that you share with other people.     On the other page, label the top “inside” and put your name on it.  This page will be how you think you look, act, and feel on the inside.  This is the 'you' that you keep to yourself.     You can use words and symbols, as well as drawing, to help represent yourself idea.\"     Pts were given two pages.  Each page had an empty face for Pt to fill in according to the instructions, then Pt was to turn in the two pages, as well as the first worksheet, to the MHW.  MHW played music in the background while " IN-PATIENT SERVICE  [facility]    CONSULTATION / HISTORY AND PHYSICAL EXAMINATION            Date:   5/8/2024  Patient name:  Gary Jones  Date of admission:  5/6/2024 10:55 PM  MRN:   [id number]  Account:  [id number]  YOB: 1985  PCP:    No primary care provider on file.  Room:   [address] [place] [address]  Code Status:    Full Code    Physician Requesting Consult: Quentin Lao MD    Reason for Consult:  medical management    Chief Complaint:     Chief Complaint   Patient presents with    Hallucinations     Auditory, to kill self  Also anxiety  Drank a bottle of wine       History Obtained From:     Patient medical record nursing staff    History of Present Illness:     Patient is 39-year-old male with history of HIV compliant with the medication following up with [facility], hypertension hyperlipidemia, alcohol abuse has been admitted at [facility] for further management of acute psychosis    Past Medical History:     Past Medical History:   Diagnosis Date    Depression with suicidal ideation 4/16/2024    HIV disease (HCC)         Past Surgical History:     Past Surgical History:   Procedure Laterality Date    MOUTH SURGERY Bilateral     OTHER SURGICAL HISTORY  04/27/2017    extubatio in OR    [place] OFFICE/OUTPT VISIT,PROCEDURE ONLY N/A 4/27/2018    EXTUBATION OF ARTIFICIAL AIRWAY performed by Kiana Camarena MD at [facility] OR    [place] OPEN TREATMENT PALATAL/MAXILLARY FRACTURE N/A 4/25/2018    ORIF SYMPHYSIS, CLOSED REDUCTION LEFT RAMUS performed by Eliseo Barrow DDS at [facility] OR        Medications Prior to Admission:     Prior to Admission medications    Medication Sig Start Date End Date Taking? Authorizing Provider   abacavir-dolutegravir-lamiVUDine (TRIUMEQ) 600- MG TABS Take 1 tablet by mouth daily 4/20/24   Emery Hardy MD   amLODIPine (NORVASC) 5 MG tablet Take 1 tablet by mouth daily 4/19/24   Emery Hardy MD   atorvastatin (LIPITOR) 20 MG tablet Take 1 tablet by mouth daily  "Pt completed these tasks.    Name: Bessie Carlisle YOB: 2011   MR: [id number]      Facilitator: [facility]  Level of Participation: moderate  Quality of Participation: appropriate/pleasant and cooperative  Interactions with others: appropriate  Mood/Affect: appropriate  Cognition: coherent/clear  Progress: Moderate  Comments: Pt behaved appropriately and participated fully.  Pt willingly worked on the strengths and qualities worksheet but did not complete it due to time constraints.  Pt named \"sharing my things, complimenting people, being funny\" as three times they made others happy.  Pt willingly worked on the two faces assignment but did not complete it due to time constraints.  Pt's inside face featured a face with a neutral expression.  Pt's outside face featured a face with a similar neutral expression, featuring the words \"quiet, tense\" along with earrings and acne drawn on.  Plan: continue with services      "

## 2024-10-31 ENCOUNTER — TELEPHONE (OUTPATIENT)
Dept: ORTHOPEDIC SURGERY | Age: 39
End: 2024-10-31

## 2025-01-28 ENCOUNTER — HOSPITAL ENCOUNTER (INPATIENT)
Age: 40
LOS: 6 days | Discharge: HOME | DRG: 974 | End: 2025-02-03
Attending: STUDENT IN AN ORGANIZED HEALTH CARE EDUCATION/TRAINING PROGRAM | Admitting: INTERNAL MEDICINE
Payer: COMMERCIAL

## 2025-01-28 ENCOUNTER — APPOINTMENT (OUTPATIENT)
Dept: GENERAL RADIOLOGY | Age: 40
DRG: 974 | End: 2025-01-28
Payer: COMMERCIAL

## 2025-01-28 DIAGNOSIS — R45.851 SUICIDAL IDEATION: Primary | ICD-10-CM

## 2025-01-28 DIAGNOSIS — E87.1 HYPONATREMIA: ICD-10-CM

## 2025-01-28 DIAGNOSIS — R06.02 SHORTNESS OF BREATH: ICD-10-CM

## 2025-01-28 LAB
ALBUMIN SERPL-MCNC: 4.3 G/DL (ref 3.5–5.2)
ALP SERPL-CCNC: 79 U/L (ref 40–129)
ALT SERPL-CCNC: 28 U/L (ref 10–50)
AMPHET UR QL SCN: NEGATIVE
ANION GAP SERPL CALCULATED.3IONS-SCNC: 17 MMOL/L (ref 9–16)
AST SERPL-CCNC: 48 U/L (ref 10–50)
BACTERIA URNS QL MICRO: ABNORMAL
BARBITURATES UR QL SCN: NEGATIVE
BASOPHILS # BLD: 0 K/UL (ref 0–0.2)
BASOPHILS NFR BLD: 0 % (ref 0–2)
BENZODIAZ UR QL: NEGATIVE
BILIRUB SERPL-MCNC: 0.3 MG/DL (ref 0–1.2)
BILIRUB UR QL STRIP: NEGATIVE
BUN SERPL-MCNC: 3 MG/DL (ref 6–20)
CALCIUM SERPL-MCNC: 9.1 MG/DL (ref 8.6–10.4)
CANNABINOIDS UR QL SCN: NEGATIVE
CASTS #/AREA URNS LPF: ABNORMAL /LPF
CHLORIDE SERPL-SCNC: 89 MMOL/L (ref 98–107)
CK SERPL-CCNC: 297 U/L (ref 39–308)
CLARITY UR: CLEAR
CO2 SERPL-SCNC: 21 MMOL/L (ref 20–31)
COCAINE UR QL SCN: NEGATIVE
COLOR UR: YELLOW
CREAT SERPL-MCNC: 0.8 MG/DL (ref 0.7–1.2)
EOSINOPHIL # BLD: 0.05 K/UL (ref 0–0.4)
EOSINOPHILS RELATIVE PERCENT: 1 % (ref 0–4)
EPI CELLS #/AREA URNS HPF: ABNORMAL /HPF
ERYTHROCYTE [DISTWIDTH] IN BLOOD BY AUTOMATED COUNT: 14.1 % (ref 11.5–14.9)
ETHANOL PERCENT: 0.34 %
ETHANOLAMINE SERPL-MCNC: 337 MG/DL (ref 0–0.08)
FENTANYL UR QL: NEGATIVE
FLUAV RNA RESP QL NAA+PROBE: NOT DETECTED
FLUBV RNA RESP QL NAA+PROBE: NOT DETECTED
GFR, ESTIMATED: >90 ML/MIN/1.73M2
GLUCOSE SERPL-MCNC: 100 MG/DL (ref 74–99)
GLUCOSE UR STRIP-MCNC: NEGATIVE MG/DL
HCT VFR BLD AUTO: 43 % (ref 41–53)
HGB BLD-MCNC: 14.4 G/DL (ref 13.5–17.5)
HGB UR QL STRIP.AUTO: NEGATIVE
KETONES UR STRIP-MCNC: ABNORMAL MG/DL
LEUKOCYTE ESTERASE UR QL STRIP: NEGATIVE
LYMPHOCYTES NFR BLD: 2.75 K/UL (ref 1–4.8)
LYMPHOCYTES RELATIVE PERCENT: 54 % (ref 24–44)
MCH RBC QN AUTO: 34.9 PG (ref 26–34)
MCHC RBC AUTO-ENTMCNC: 33.5 G/DL (ref 31–37)
MCV RBC AUTO: 104.3 FL (ref 80–100)
METHADONE UR QL: NEGATIVE
MONOCYTES NFR BLD: 0.97 K/UL (ref 0.1–1.3)
MONOCYTES NFR BLD: 19 % (ref 1–7)
MORPHOLOGY: ABNORMAL
NEUTROPHILS NFR BLD: 26 % (ref 36–66)
NEUTS SEG NFR BLD: 1.33 K/UL (ref 1.3–9.1)
NITRITE UR QL STRIP: NEGATIVE
OPIATES UR QL SCN: NEGATIVE
OSMOLALITY SERPL: 278 MOSM/KG (ref 275–295)
OSMOLALITY SERPL: 279 MOSM/KG (ref 275–295)
OXYCODONE UR QL SCN: NEGATIVE
PCP UR QL SCN: NEGATIVE
PH UR STRIP: 5.5 [PH] (ref 5–8)
PLATELET # BLD AUTO: 215 K/UL (ref 150–450)
PMV BLD AUTO: 7 FL (ref 6–12)
POTASSIUM SERPL-SCNC: 4.3 MMOL/L (ref 3.7–5.3)
PROT SERPL-MCNC: 8.2 G/DL (ref 6.6–8.7)
PROT UR STRIP-MCNC: ABNORMAL MG/DL
RBC # BLD AUTO: 4.12 M/UL (ref 4.5–5.9)
RBC #/AREA URNS HPF: ABNORMAL /HPF
SARS-COV-2 RNA RESP QL NAA+PROBE: NOT DETECTED
SODIUM SERPL-SCNC: 124 MMOL/L (ref 136–145)
SODIUM SERPL-SCNC: 125 MMOL/L (ref 136–145)
SODIUM SERPL-SCNC: 126 MMOL/L (ref 136–145)
SODIUM SERPL-SCNC: 127 MMOL/L (ref 136–145)
SOURCE: NORMAL
SP GR UR STRIP: 1.01 (ref 1–1.03)
SPECIMEN DESCRIPTION: NORMAL
TEST INFORMATION: NORMAL
TSH SERPL DL<=0.05 MIU/L-ACNC: 0.62 UIU/ML (ref 0.27–4.2)
UROBILINOGEN UR STRIP-ACNC: NORMAL EU/DL (ref 0–1)
WBC #/AREA URNS HPF: ABNORMAL /HPF
WBC OTHER # BLD: 5.1 K/UL (ref 3.5–11)

## 2025-01-28 PROCEDURE — 99223 1ST HOSP IP/OBS HIGH 75: CPT | Performed by: INTERNAL MEDICINE

## 2025-01-28 PROCEDURE — 36415 COLL VENOUS BLD VENIPUNCTURE: CPT

## 2025-01-28 PROCEDURE — 99285 EMERGENCY DEPT VISIT HI MDM: CPT

## 2025-01-28 PROCEDURE — 6370000000 HC RX 637 (ALT 250 FOR IP)

## 2025-01-28 PROCEDURE — 87636 SARSCOV2 & INF A&B AMP PRB: CPT

## 2025-01-28 PROCEDURE — 84443 ASSAY THYROID STIM HORMONE: CPT

## 2025-01-28 PROCEDURE — 6360000002 HC RX W HCPCS: Performed by: INTERNAL MEDICINE

## 2025-01-28 PROCEDURE — 80307 DRUG TEST PRSMV CHEM ANLYZR: CPT

## 2025-01-28 PROCEDURE — 81001 URINALYSIS AUTO W/SCOPE: CPT

## 2025-01-28 PROCEDURE — G0480 DRUG TEST DEF 1-7 CLASSES: HCPCS

## 2025-01-28 PROCEDURE — 0202U NFCT DS 22 TRGT SARS-COV-2: CPT

## 2025-01-28 PROCEDURE — 2500000003 HC RX 250 WO HCPCS: Performed by: INTERNAL MEDICINE

## 2025-01-28 PROCEDURE — 2060000000 HC ICU INTERMEDIATE R&B

## 2025-01-28 PROCEDURE — 82550 ASSAY OF CK (CPK): CPT

## 2025-01-28 PROCEDURE — 2500000003 HC RX 250 WO HCPCS

## 2025-01-28 PROCEDURE — 84295 ASSAY OF SERUM SODIUM: CPT

## 2025-01-28 PROCEDURE — 84300 ASSAY OF URINE SODIUM: CPT

## 2025-01-28 PROCEDURE — 83935 ASSAY OF URINE OSMOLALITY: CPT

## 2025-01-28 PROCEDURE — 71045 X-RAY EXAM CHEST 1 VIEW: CPT

## 2025-01-28 PROCEDURE — 86361 T CELL ABSOLUTE COUNT: CPT

## 2025-01-28 PROCEDURE — 2580000003 HC RX 258: Performed by: INTERNAL MEDICINE

## 2025-01-28 PROCEDURE — 87040 BLOOD CULTURE FOR BACTERIA: CPT

## 2025-01-28 PROCEDURE — 80053 COMPREHEN METABOLIC PANEL: CPT

## 2025-01-28 PROCEDURE — 82436 ASSAY OF URINE CHLORIDE: CPT

## 2025-01-28 PROCEDURE — 6370000000 HC RX 637 (ALT 250 FOR IP): Performed by: INTERNAL MEDICINE

## 2025-01-28 PROCEDURE — 93005 ELECTROCARDIOGRAM TRACING: CPT | Performed by: INTERNAL MEDICINE

## 2025-01-28 PROCEDURE — 51798 US URINE CAPACITY MEASURE: CPT

## 2025-01-28 PROCEDURE — 85025 COMPLETE CBC W/AUTO DIFF WBC: CPT

## 2025-01-28 PROCEDURE — 6370000000 HC RX 637 (ALT 250 FOR IP): Performed by: NURSE PRACTITIONER

## 2025-01-28 PROCEDURE — 6360000002 HC RX W HCPCS

## 2025-01-28 PROCEDURE — 83930 ASSAY OF BLOOD OSMOLALITY: CPT

## 2025-01-28 PROCEDURE — 2580000003 HC RX 258

## 2025-01-28 RX ORDER — GAUZE BANDAGE 2" X 2"
100 BANDAGE TOPICAL DAILY
Status: DISCONTINUED | OUTPATIENT
Start: 2025-01-28 | End: 2025-01-28 | Stop reason: SDUPTHER

## 2025-01-28 RX ORDER — TRAZODONE HYDROCHLORIDE 50 MG/1
50 TABLET, FILM COATED ORAL NIGHTLY PRN
Status: DISCONTINUED | OUTPATIENT
Start: 2025-01-28 | End: 2025-02-03 | Stop reason: HOSPADM

## 2025-01-28 RX ORDER — LORAZEPAM 1 MG/1
3 TABLET ORAL
Status: DISCONTINUED | OUTPATIENT
Start: 2025-01-28 | End: 2025-02-02

## 2025-01-28 RX ORDER — LORAZEPAM 2 MG/ML
2 INJECTION INTRAMUSCULAR
Status: DISCONTINUED | OUTPATIENT
Start: 2025-01-28 | End: 2025-02-02

## 2025-01-28 RX ORDER — GAUZE BANDAGE 2" X 2"
100 BANDAGE TOPICAL DAILY
Status: DISCONTINUED | OUTPATIENT
Start: 2025-01-28 | End: 2025-02-03 | Stop reason: HOSPADM

## 2025-01-28 RX ORDER — LAMIVUDINE 100 MG/1
300 TABLET, FILM COATED ORAL DAILY
Status: DISCONTINUED | OUTPATIENT
Start: 2025-01-28 | End: 2025-01-29

## 2025-01-28 RX ORDER — LORAZEPAM 1 MG/1
1 TABLET ORAL
Status: DISCONTINUED | OUTPATIENT
Start: 2025-01-28 | End: 2025-02-02

## 2025-01-28 RX ORDER — LORAZEPAM 1 MG/1
2 TABLET ORAL
Status: DISCONTINUED | OUTPATIENT
Start: 2025-01-28 | End: 2025-02-02

## 2025-01-28 RX ORDER — METOPROLOL TARTRATE 1 MG/ML
5 INJECTION, SOLUTION INTRAVENOUS EVERY 6 HOURS PRN
Status: DISCONTINUED | OUTPATIENT
Start: 2025-01-28 | End: 2025-02-03 | Stop reason: HOSPADM

## 2025-01-28 RX ORDER — AMLODIPINE BESYLATE 5 MG/1
5 TABLET ORAL DAILY
Status: DISCONTINUED | OUTPATIENT
Start: 2025-01-28 | End: 2025-01-28

## 2025-01-28 RX ORDER — LORAZEPAM 1 MG/1
4 TABLET ORAL
Status: DISCONTINUED | OUTPATIENT
Start: 2025-01-28 | End: 2025-02-02

## 2025-01-28 RX ORDER — ACETAMINOPHEN 650 MG/1
650 SUPPOSITORY RECTAL EVERY 6 HOURS PRN
Status: DISCONTINUED | OUTPATIENT
Start: 2025-01-28 | End: 2025-02-03 | Stop reason: HOSPADM

## 2025-01-28 RX ORDER — SODIUM CHLORIDE 0.9 % (FLUSH) 0.9 %
5-40 SYRINGE (ML) INJECTION EVERY 12 HOURS SCHEDULED
Status: DISCONTINUED | OUTPATIENT
Start: 2025-01-28 | End: 2025-02-03 | Stop reason: HOSPADM

## 2025-01-28 RX ORDER — BISACODYL 10 MG
10 SUPPOSITORY, RECTAL RECTAL DAILY PRN
Status: DISCONTINUED | OUTPATIENT
Start: 2025-01-28 | End: 2025-02-03 | Stop reason: HOSPADM

## 2025-01-28 RX ORDER — LOPERAMIDE HYDROCHLORIDE 2 MG/1
2 CAPSULE ORAL 4 TIMES DAILY PRN
Status: DISCONTINUED | OUTPATIENT
Start: 2025-01-28 | End: 2025-02-02

## 2025-01-28 RX ORDER — SODIUM CHLORIDE 9 MG/ML
INJECTION, SOLUTION INTRAVENOUS PRN
Status: DISCONTINUED | OUTPATIENT
Start: 2025-01-28 | End: 2025-02-03 | Stop reason: HOSPADM

## 2025-01-28 RX ORDER — MAGNESIUM SULFATE HEPTAHYDRATE 40 MG/ML
2000 INJECTION, SOLUTION INTRAVENOUS PRN
Status: DISCONTINUED | OUTPATIENT
Start: 2025-01-28 | End: 2025-01-29 | Stop reason: CLARIF

## 2025-01-28 RX ORDER — ONDANSETRON 2 MG/ML
4 INJECTION INTRAMUSCULAR; INTRAVENOUS EVERY 6 HOURS PRN
Status: DISCONTINUED | OUTPATIENT
Start: 2025-01-28 | End: 2025-02-03 | Stop reason: HOSPADM

## 2025-01-28 RX ORDER — ATORVASTATIN CALCIUM 20 MG/1
20 TABLET, FILM COATED ORAL NIGHTLY
Status: DISCONTINUED | OUTPATIENT
Start: 2025-01-28 | End: 2025-02-03 | Stop reason: HOSPADM

## 2025-01-28 RX ORDER — LORAZEPAM 2 MG/ML
1 INJECTION INTRAMUSCULAR
Status: DISCONTINUED | OUTPATIENT
Start: 2025-01-28 | End: 2025-02-02

## 2025-01-28 RX ORDER — ONDANSETRON 4 MG/1
4 TABLET, ORALLY DISINTEGRATING ORAL EVERY 8 HOURS PRN
Status: DISCONTINUED | OUTPATIENT
Start: 2025-01-28 | End: 2025-02-03 | Stop reason: HOSPADM

## 2025-01-28 RX ORDER — SODIUM CHLORIDE 0.9 % (FLUSH) 0.9 %
10 SYRINGE (ML) INJECTION PRN
Status: DISCONTINUED | OUTPATIENT
Start: 2025-01-28 | End: 2025-02-03 | Stop reason: HOSPADM

## 2025-01-28 RX ORDER — POTASSIUM CHLORIDE 1500 MG/1
40 TABLET, EXTENDED RELEASE ORAL PRN
Status: DISCONTINUED | OUTPATIENT
Start: 2025-01-28 | End: 2025-01-29 | Stop reason: CLARIF

## 2025-01-28 RX ORDER — CYPROHEPTADINE HYDROCHLORIDE 4 MG/1
12 TABLET ORAL ONCE
Status: COMPLETED | OUTPATIENT
Start: 2025-01-28 | End: 2025-01-28

## 2025-01-28 RX ORDER — SODIUM CHLORIDE 0.9 % (FLUSH) 0.9 %
5-40 SYRINGE (ML) INJECTION PRN
Status: DISCONTINUED | OUTPATIENT
Start: 2025-01-28 | End: 2025-02-03 | Stop reason: HOSPADM

## 2025-01-28 RX ORDER — ACETAMINOPHEN 325 MG/1
650 TABLET ORAL EVERY 6 HOURS PRN
Status: DISCONTINUED | OUTPATIENT
Start: 2025-01-28 | End: 2025-02-03 | Stop reason: HOSPADM

## 2025-01-28 RX ORDER — CYPROHEPTADINE HYDROCHLORIDE 4 MG/1
2 TABLET ORAL EVERY 4 HOURS PRN
Status: DISCONTINUED | OUTPATIENT
Start: 2025-01-28 | End: 2025-02-03 | Stop reason: HOSPADM

## 2025-01-28 RX ORDER — METOPROLOL TARTRATE 1 MG/ML
5 INJECTION, SOLUTION INTRAVENOUS ONCE
Status: COMPLETED | OUTPATIENT
Start: 2025-01-28 | End: 2025-01-28

## 2025-01-28 RX ORDER — POLYETHYLENE GLYCOL 3350 17 G/17G
17 POWDER, FOR SOLUTION ORAL DAILY PRN
Status: DISCONTINUED | OUTPATIENT
Start: 2025-01-28 | End: 2025-02-03 | Stop reason: HOSPADM

## 2025-01-28 RX ORDER — OLANZAPINE 10 MG/1
15 TABLET ORAL NIGHTLY
Status: DISCONTINUED | OUTPATIENT
Start: 2025-01-28 | End: 2025-02-03 | Stop reason: HOSPADM

## 2025-01-28 RX ORDER — SODIUM CHLORIDE 9 MG/ML
INJECTION, SOLUTION INTRAVENOUS CONTINUOUS
Status: DISCONTINUED | OUTPATIENT
Start: 2025-01-28 | End: 2025-01-29

## 2025-01-28 RX ORDER — LORAZEPAM 2 MG/ML
3 INJECTION INTRAMUSCULAR
Status: DISCONTINUED | OUTPATIENT
Start: 2025-01-28 | End: 2025-02-02

## 2025-01-28 RX ORDER — LORAZEPAM 2 MG/ML
4 INJECTION INTRAMUSCULAR
Status: DISCONTINUED | OUTPATIENT
Start: 2025-01-28 | End: 2025-02-02

## 2025-01-28 RX ORDER — CYPROHEPTADINE HYDROCHLORIDE 4 MG/1
2 TABLET ORAL EVERY 6 HOURS PRN
Status: DISCONTINUED | OUTPATIENT
Start: 2025-01-28 | End: 2025-01-28

## 2025-01-28 RX ORDER — ENOXAPARIN SODIUM 100 MG/ML
40 INJECTION SUBCUTANEOUS DAILY
Status: DISCONTINUED | OUTPATIENT
Start: 2025-01-28 | End: 2025-02-03 | Stop reason: HOSPADM

## 2025-01-28 RX ORDER — FOLIC ACID 1 MG/1
1 TABLET ORAL DAILY
Status: DISCONTINUED | OUTPATIENT
Start: 2025-01-28 | End: 2025-02-03 | Stop reason: HOSPADM

## 2025-01-28 RX ORDER — POTASSIUM CHLORIDE 7.45 MG/ML
10 INJECTION INTRAVENOUS PRN
Status: DISCONTINUED | OUTPATIENT
Start: 2025-01-28 | End: 2025-01-29 | Stop reason: CLARIF

## 2025-01-28 RX ORDER — ABACAVIR 300 MG/1
600 TABLET ORAL DAILY
Status: DISCONTINUED | OUTPATIENT
Start: 2025-01-28 | End: 2025-01-29

## 2025-01-28 RX ADMIN — SODIUM CHLORIDE: 9 INJECTION, SOLUTION INTRAVENOUS at 08:44

## 2025-01-28 RX ADMIN — PIPERACILLIN AND TAZOBACTAM 3375 MG: 3; .375 INJECTION, POWDER, LYOPHILIZED, FOR SOLUTION INTRAVENOUS at 13:39

## 2025-01-28 RX ADMIN — THIAMINE HCL TAB 100 MG 100 MG: 100 TAB at 15:09

## 2025-01-28 RX ADMIN — LOPERAMIDE HYDROCHLORIDE 2 MG: 2 CAPSULE ORAL at 20:34

## 2025-01-28 RX ADMIN — SODIUM CHLORIDE, PRESERVATIVE FREE 10 ML: 5 INJECTION INTRAVENOUS at 21:23

## 2025-01-28 RX ADMIN — THIAMINE HCL TAB 100 MG 100 MG: 100 TAB at 08:45

## 2025-01-28 RX ADMIN — CYPROHEPTADINE HYDROCHLORIDE 2 MG: 4 TABLET ORAL at 20:36

## 2025-01-28 RX ADMIN — ENOXAPARIN SODIUM 40 MG: 100 INJECTION SUBCUTANEOUS at 08:45

## 2025-01-28 RX ADMIN — LORAZEPAM 3 MG: 1 TABLET ORAL at 12:32

## 2025-01-28 RX ADMIN — DOLUTEGRAVIR SODIUM 50 MG: 50 TABLET, FILM COATED ORAL at 23:14

## 2025-01-28 RX ADMIN — LORAZEPAM 2 MG: 2 INJECTION INTRAMUSCULAR; INTRAVENOUS at 17:49

## 2025-01-28 RX ADMIN — LAMIVUDINE 300 MG: 100 TABLET, FILM COATED ORAL at 23:14

## 2025-01-28 RX ADMIN — PIPERACILLIN AND TAZOBACTAM 3375 MG: 3; .375 INJECTION, POWDER, LYOPHILIZED, FOR SOLUTION INTRAVENOUS at 20:46

## 2025-01-28 RX ADMIN — FOLIC ACID 1 MG: 1 TABLET ORAL at 08:45

## 2025-01-28 RX ADMIN — ACETAMINOPHEN 650 MG: 325 TABLET ORAL at 12:34

## 2025-01-28 RX ADMIN — ABACAVIR 600 MG: 300 TABLET, FILM COATED ORAL at 23:14

## 2025-01-28 RX ADMIN — Medication 3 MG: at 22:26

## 2025-01-28 RX ADMIN — FLUOXETINE HYDROCHLORIDE 20 MG: 20 CAPSULE ORAL at 08:45

## 2025-01-28 RX ADMIN — CYPROHEPTADINE HYDROCHLORIDE 12 MG: 4 TABLET ORAL at 18:35

## 2025-01-28 RX ADMIN — ONDANSETRON 4 MG: 4 TABLET, ORALLY DISINTEGRATING ORAL at 08:40

## 2025-01-28 RX ADMIN — LORAZEPAM 1 MG: 2 INJECTION INTRAMUSCULAR; INTRAVENOUS at 15:10

## 2025-01-28 RX ADMIN — SODIUM CHLORIDE, PRESERVATIVE FREE 10 ML: 5 INJECTION INTRAVENOUS at 08:49

## 2025-01-28 RX ADMIN — METOPROLOL TARTRATE 5 MG: 1 INJECTION, SOLUTION INTRAVENOUS at 13:36

## 2025-01-28 RX ADMIN — ACETAMINOPHEN 650 MG: 325 TABLET ORAL at 18:20

## 2025-01-28 RX ADMIN — METOPROLOL TARTRATE 5 MG: 1 INJECTION, SOLUTION INTRAVENOUS at 18:18

## 2025-01-28 RX ADMIN — ATORVASTATIN CALCIUM 20 MG: 20 TABLET, FILM COATED ORAL at 20:32

## 2025-01-28 RX ADMIN — SODIUM CHLORIDE: 9 INJECTION, SOLUTION INTRAVENOUS at 13:36

## 2025-01-28 RX ADMIN — AMLODIPINE BESYLATE 5 MG: 5 TABLET ORAL at 08:45

## 2025-01-28 RX ADMIN — SODIUM CHLORIDE: 9 INJECTION, SOLUTION INTRAVENOUS at 15:13

## 2025-01-28 ASSESSMENT — PAIN - FUNCTIONAL ASSESSMENT: PAIN_FUNCTIONAL_ASSESSMENT: NONE - DENIES PAIN
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
## 2025-01-28 NOTE — CARE COORDINATION
Case Management Assessment  Initial Evaluation    Date/Time of Evaluation: 1/28/2025 10:35AM  Assessment Completed by: Lashaun Carrera RN    If patient is discharged prior to next notation, then this note serves as note for discharge by case management.    Patient Name: Gary Jones                   YOB: 1985  Diagnosis: Suicidal ideation [R45.851]  Hyponatremia [E87.1]                   Date / Time: 1/28/2025  1:31 AM    Patient Admission Status: Inpatient   Readmission Risk (Low < 19, Mod (19-27), High > 27): Readmission Risk Score: 13    Current PCP: No primary care provider on file.  PCP verified by CM? Yes    Chart Reviewed: Yes      History Provided by: Patient  Patient Orientation: Alert and Oriented    Patient Cognition: Alert    Hospitalization in the last 30 days (Readmission):  No    If yes, Readmission Assessment in CM Navigator will be completed.    Advance Directives:      Code Status: Full Code   Patient's Primary Decision Maker is:        Discharge Planning:    Patient lives with: Family Members Type of Home: House  Primary Care Giver: Self  Patient Support Systems include: Family Members   Current Financial resources: Other (Comment) (commercial)  Current community resources: None  Current services prior to admission: None            Current DME:              Type of Home Care services:  None    ADLS  Prior functional level: Independent in ADLs/IADLs  Current functional level: Independent in ADLs/IADLs    PT AM-PAC:   /24  OT AM-PAC:   /24    Family can provide assistance at DC: No  Would you like Case Management to discuss the discharge plan with any other family members/significant others, and if so, who? No  Plans to Return to Present Housing: Yes  Other Identified Issues/Barriers to RETURNING to current housing: none  Potential Assistance needed at discharge: N/A            Potential DME:    Patient expects to discharge to: House  Plan for transportation at discharge:
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
## 2025-01-28 NOTE — H&P
TO 2 0 TO 2 /HPF    Casts UA 0 TO 2 (A) None /LPF    Epithelial Cells, UA 0 TO 2 /HPF    Bacteria, UA None None   Sodium    Collection Time: 01/28/25  8:38 AM   Result Value Ref Range    Sodium 124 (L) 136 - 145 mmol/L   CK    Collection Time: 01/28/25  1:57 PM   Result Value Ref Range    Total  39 - 308 U/L   TSH reflex to FT4    Collection Time: 01/28/25  1:57 PM   Result Value Ref Range    TSH 0.62 0.27 - 4.20 uIU/mL   COVID-19 & Influenza Combo    Collection Time: 01/28/25  2:15 PM    Specimen: Nasopharyngeal Swab   Result Value Ref Range    Specimen Description .NASOPHARYNGEAL SWAB     Source .NASOPHARYNGEAL SWAB     SARS-CoV-2 RNA, RT PCR Not Detected Not Detected    Influenza A Not Detected Not Detected    Influenza B Not Detected Not Detected       Imaging/Diagnostics:        Assessment :      Primary Problem  Hyponatremia    Active Hospital Problems    Diagnosis Date Noted    Hyponatremia [E87.1] 01/28/2025       Plan:     Patient status Admit as inpatient in the  Progressive Unit/Step down    Patient is a 39-year-old male with past medical history of HIV, depression anxiety, alcohol abuse, presented to the [facility] depression suicidal ideation, found to be have severe hyponatremia  Severe hyponatremia, likely secondary to underlying beer potomania and dehydration patient has been having poor p.o. intake, will check TSH urine studies, consult nephrology patient currently on IV fluids, check sodium every 4,  High suspicion of serotonin syndrome, patient tachycardic hyperthermic, tremulous,, diarrhea though no hyperreflexia or clonus noticed, is alert and awake will give cyproheptadine, 12 mg and then 2 mg every 6 hour, hold onto fluoxetine and olanzapine, Ativan as needed, IV hydration, will transfer to intermediate ICU for next 24 hours to get monitor closely,, CK is normal  hiv, home medication resumed, patient has been febrile tachycardic, no source of infection could be found will get CD4 count, ID

## 2025-01-28 NOTE — ED NOTES
Patient provided with the opportunity to sign the Rights of an Involuntarily Detained Person form while in [facility] due to being placed on an application for emergency admisison.  This form was explained in detail to the patient at their comprehension level.  Any and all questions were answer to the best of writer's ability.    Patient either chose not to sign this form or they were incapable of signing this form due to their current mental state.  Form will be identified as a patient refusal and a secondary signature will be obtained.

## 2025-01-29 ENCOUNTER — APPOINTMENT (OUTPATIENT)
Dept: CT IMAGING | Age: 40
DRG: 974 | End: 2025-01-29
Payer: COMMERCIAL

## 2025-01-29 ENCOUNTER — APPOINTMENT (OUTPATIENT)
Dept: GENERAL RADIOLOGY | Age: 40
DRG: 974 | End: 2025-01-29
Payer: COMMERCIAL

## 2025-01-29 ENCOUNTER — APPOINTMENT (OUTPATIENT)
Dept: INTERVENTIONAL RADIOLOGY/VASCULAR | Age: 40
DRG: 974 | End: 2025-01-29
Payer: COMMERCIAL

## 2025-01-29 ENCOUNTER — HOSPITAL ENCOUNTER (OUTPATIENT)
Age: 40
Discharge: HOME | End: 2025-01-29
Attending: INTERNAL MEDICINE

## 2025-01-29 PROBLEM — G93.40 ACUTE ENCEPHALOPATHY: Status: ACTIVE | Noted: 2025-01-29

## 2025-01-29 PROBLEM — N17.9 AKI (ACUTE KIDNEY INJURY) (HCC): Status: ACTIVE | Noted: 2025-01-29

## 2025-01-29 LAB
ABSOLUTE BANDS: 2.38 K/UL (ref 0–1)
ANION GAP SERPL CALCULATED.3IONS-SCNC: 15 MMOL/L (ref 9–16)
ANION GAP SERPL CALCULATED.3IONS-SCNC: 16 MMOL/L (ref 9–16)
APAP SERPL-MCNC: <5 UG/ML (ref 10–30)
APPEARANCE CSF: CLEAR
B PARAP IS1001 DNA NPH QL NAA+NON-PROBE: NOT DETECTED
B PERT DNA SPEC QL NAA+PROBE: NOT DETECTED
BANDS, CSF: 0 %
BANDS: 22 % (ref 0–10)
BASOPHILS # BLD: 0 K/UL (ref 0–0.2)
BASOPHILS NFR BLD: 0 % (ref 0–2)
BASOPHILS NFR CSF: 0 %
BLASTS NFR CSF: 0 %
BUN SERPL-MCNC: 11 MG/DL (ref 6–20)
BUN SERPL-MCNC: 11 MG/DL (ref 6–20)
C DIFF GDH + TOXINS A+B STL QL IA.RAPID: NEGATIVE
C GATTII+NEOFOR DNA CSF QL NAA+NON-PROBE: NOT DETECTED
C PNEUM DNA NPH QL NAA+NON-PROBE: NOT DETECTED
CALCIUM SERPL-MCNC: 7.7 MG/DL (ref 8.6–10.4)
CALCIUM SERPL-MCNC: 8 MG/DL (ref 8.6–10.4)
CD3+CD4+ CELLS # BLD: 330 /UL (ref 309–1571)
CD3+CD4+ CELLS NFR BLD: 12 % (ref 27–64)
CHLORIDE SERPL-SCNC: 102 MMOL/L (ref 98–107)
CHLORIDE SERPL-SCNC: 93 MMOL/L (ref 98–107)
CHLORIDE UR-SCNC: <20 MMOL/L
CK SERPL-CCNC: 1487 U/L (ref 39–308)
CMV DNA CSF QL NAA+NON-PROBE: NOT DETECTED
CO2 SERPL-SCNC: 14 MMOL/L (ref 20–31)
CO2 SERPL-SCNC: 19 MMOL/L (ref 20–31)
COLOR CSF: COLORLESS
CREAT SERPL-MCNC: 2 MG/DL (ref 0.7–1.2)
CREAT SERPL-MCNC: 3.1 MG/DL (ref 0.7–1.2)
CRP SERPL HS-MCNC: 322 MG/L (ref 0–5)
E COLI K1 DNA CSF QL NAA+NON-PROBE: NOT DETECTED
EKG ATRIAL RATE: 147 BPM
EKG ATRIAL RATE: 161 BPM
EKG P AXIS: 49 DEGREES
EKG P AXIS: 56 DEGREES
EKG P-R INTERVAL: 114 MS
EKG P-R INTERVAL: 124 MS
EKG Q-T INTERVAL: 274 MS
EKG Q-T INTERVAL: 312 MS
EKG QRS DURATION: 72 MS
EKG QRS DURATION: 78 MS
EKG QTC CALCULATION (BAZETT): 428 MS
EKG QTC CALCULATION (BAZETT): 510 MS
EKG R AXIS: -8 DEGREES
EKG R AXIS: 28 DEGREES
EKG T AXIS: 33 DEGREES
EKG T AXIS: 54 DEGREES
EKG VENTRICULAR RATE: 147 BPM
EKG VENTRICULAR RATE: 161 BPM
EOSINOPHIL # BLD: 0 K/UL (ref 0–0.4)
EOSINOPHIL NFR CSF: 0 %
EOSINOPHILS RELATIVE PERCENT: 0 % (ref 0–4)
ERYTHROCYTE [DISTWIDTH] IN BLOOD BY AUTOMATED COUNT: 14.5 % (ref 11.5–14.9)
EV RNA CSF QL NAA+NON-PROBE: NOT DETECTED
FLUAV RNA NPH QL NAA+NON-PROBE: NOT DETECTED
FLUBV RNA NPH QL NAA+NON-PROBE: NOT DETECTED
GFR, ESTIMATED: 25 ML/MIN/1.73M2
GFR, ESTIMATED: 43 ML/MIN/1.73M2
GLUCOSE CSF-MCNC: 103 MG/DL (ref 40–70)
GLUCOSE SERPL-MCNC: 107 MG/DL (ref 74–99)
GLUCOSE SERPL-MCNC: 193 MG/DL (ref 74–99)
GP B STREP DNA CSF QL NAA+NON-PROBE: NOT DETECTED
HADV DNA NPH QL NAA+NON-PROBE: NOT DETECTED
HAEM INFLU DNA CSF QL NAA+NON-PROBE: NOT DETECTED
HCOV 229E RNA NPH QL NAA+NON-PROBE: NOT DETECTED
HCOV HKU1 RNA NPH QL NAA+NON-PROBE: NOT DETECTED
HCOV NL63 RNA NPH QL NAA+NON-PROBE: NOT DETECTED
HCOV OC43 RNA NPH QL NAA+NON-PROBE: NOT DETECTED
HCT VFR BLD AUTO: 36.9 % (ref 41–53)
HGB BLD-MCNC: 12.2 G/DL (ref 13.5–17.5)
HHV6 DNA CSF QL NAA+NON-PROBE: NOT DETECTED
HMPV RNA NPH QL NAA+NON-PROBE: NOT DETECTED
HPIV1 RNA NPH QL NAA+NON-PROBE: NOT DETECTED
HPIV2 RNA NPH QL NAA+NON-PROBE: NOT DETECTED
HPIV3 RNA NPH QL NAA+NON-PROBE: NOT DETECTED
HPIV4 RNA NPH QL NAA+NON-PROBE: NOT DETECTED
HSV1 DNA CSF QL NAA+NON-PROBE: NOT DETECTED
HSV2 DNA CSF QL NAA+NON-PROBE: NOT DETECTED
INR PPP: 1.3
L MONOCYTOG DNA CSF QL NAA+NON-PROBE: NOT DETECTED
LACTATE BLDV-SCNC: 2.1 MMOL/L (ref 0.5–2.2)
LYMPHOCYTES # BLD: 54 % (ref 24–44)
LYMPHOCYTES NFR BLD: 1.94 K/UL (ref 1–4.8)
LYMPHOCYTES NFR CSF: 60 %
LYMPHOCYTES RELATIVE PERCENT: 18 % (ref 24–44)
M PNEUMO DNA NPH QL NAA+NON-PROBE: NOT DETECTED
MAGNESIUM SERPL-MCNC: 0.8 MG/DL (ref 1.6–2.6)
MAGNESIUM SERPL-MCNC: 2.7 MG/DL (ref 1.6–2.6)
MCH RBC QN AUTO: 35.1 PG (ref 26–34)
MCHC RBC AUTO-ENTMCNC: 33.1 G/DL (ref 31–37)
MCV RBC AUTO: 106.1 FL (ref 80–100)
METAMYELOCYTES ABSOLUTE COUNT: 0.97 K/UL
METAMYELOCYTES: 9 %
METAYELO CSF: 0 %
MONOCYTES NFR BLD: 1.4 K/UL (ref 0.1–1.3)
MONOCYTES NFR BLD: 13 % (ref 1–7)
MONOCYTES, CSF: 40 %
MORPHOLOGY: ABNORMAL
MYELOCYTE CSF: 0 %
MYELOCYTES ABSOLUTE COUNT: 0.11 K/UL
MYELOCYTES: 1 %
N MEN DNA CSF QL NAA+NON-PROBE: NOT DETECTED
NEUTROPHILS NFR BLD: 37 % (ref 36–66)
NEUTROPHILS NFR CSF: 0 %
NEUTS SEG NFR BLD: 4 K/UL (ref 1.3–9.1)
NUC CELL # FLD MANUAL: 16 CELLS/UL
PARECHOVIRUS A RNA CSF QL NAA+NON-PROBE: NOT DETECTED
PLATELET # BLD AUTO: 178 K/UL (ref 150–450)
PMV BLD AUTO: 7.9 FL (ref 6–12)
POTASSIUM SERPL-SCNC: 3.5 MMOL/L (ref 3.7–5.3)
POTASSIUM SERPL-SCNC: 3.6 MMOL/L (ref 3.7–5.3)
PROCALCITONIN SERPL-MCNC: >100 NG/ML (ref 0–0.09)
PROT CSF-MCNC: 39.6 MG/DL (ref 15–45)
PROTHROMBIN TIME: 17.4 SEC (ref 11.8–14.6)
RBC # BLD AUTO: 3.48 M/UL (ref 4.5–5.9)
RBC # FLD MANUAL: 2 CELLS/UL
RSV RNA NPH QL NAA+NON-PROBE: NOT DETECTED
RV+EV RNA NPH QL NAA+NON-PROBE: NOT DETECTED
S PNEUM DNA CSF QL NAA+NON-PROBE: NOT DETECTED
SALICYLATES SERPL-MCNC: <1 MG/DL (ref 0–10)
SARS-COV-2 RNA NPH QL NAA+NON-PROBE: NOT DETECTED
SODIUM SERPL-SCNC: 123 MMOL/L (ref 136–145)
SODIUM SERPL-SCNC: 128 MMOL/L (ref 136–145)
SODIUM SERPL-SCNC: 136 MMOL/L (ref 136–145)
SODIUM SERPL-SCNC: 136 MMOL/L (ref 136–145)
SODIUM SERPL-SCNC: 137 MMOL/L (ref 136–145)
SODIUM UR-SCNC: <20 MMOL/L
SPECIMEN DESCRIPTION: NORMAL
SPECIMEN VOL CSF: ABNORMAL ML
TUBE # CSF: 3
UNIDENT CELLS NFR FLD: 0 %
VZV DNA CSF QL NAA+NON-PROBE: NOT DETECTED
WBC # BLD: 5.1 K/UL (ref 3.5–11)
WBC OTHER # BLD: 10.8 K/UL (ref 3.5–11)
XANTHOCHROMIA CSF QL: ABNORMAL

## 2025-01-29 PROCEDURE — 99222 1ST HOSP IP/OBS MODERATE 55: CPT | Performed by: PSYCHIATRY & NEUROLOGY

## 2025-01-29 PROCEDURE — 99255 IP/OBS CONSLTJ NEW/EST HI 80: CPT | Performed by: INTERNAL MEDICINE

## 2025-01-29 PROCEDURE — 85610 PROTHROMBIN TIME: CPT

## 2025-01-29 PROCEDURE — 009U3ZX DRAINAGE OF SPINAL CANAL, PERCUTANEOUS APPROACH, DIAGNOSTIC: ICD-10-PCS | Performed by: RADIOLOGY

## 2025-01-29 PROCEDURE — 80179 DRUG ASSAY SALICYLATE: CPT

## 2025-01-29 PROCEDURE — P9047 ALBUMIN (HUMAN), 25%, 50ML: HCPCS | Performed by: INTERNAL MEDICINE

## 2025-01-29 PROCEDURE — 93010 ELECTROCARDIOGRAM REPORT: CPT | Performed by: INTERNAL MEDICINE

## 2025-01-29 PROCEDURE — 87449 NOS EACH ORGANISM AG IA: CPT

## 2025-01-29 PROCEDURE — C1751 CATH, INF, PER/CENT/MIDLINE: HCPCS

## 2025-01-29 PROCEDURE — 85025 COMPLETE CBC W/AUTO DIFF WBC: CPT

## 2025-01-29 PROCEDURE — 02H633Z INSERTION OF INFUSION DEVICE INTO RIGHT ATRIUM, PERCUTANEOUS APPROACH: ICD-10-PCS | Performed by: PSYCHIATRY & NEUROLOGY

## 2025-01-29 PROCEDURE — 87483 CNS DNA AMP PROBE TYPE 12-25: CPT

## 2025-01-29 PROCEDURE — 62328 DX LMBR SPI PNXR W/FLUOR/CT: CPT

## 2025-01-29 PROCEDURE — 83605 ASSAY OF LACTIC ACID: CPT

## 2025-01-29 PROCEDURE — 2500000003 HC RX 250 WO HCPCS: Performed by: INTERNAL MEDICINE

## 2025-01-29 PROCEDURE — 86788 WEST NILE VIRUS AB IGM: CPT

## 2025-01-29 PROCEDURE — 99291 CRITICAL CARE FIRST HOUR: CPT | Performed by: INTERNAL MEDICINE

## 2025-01-29 PROCEDURE — 36415 COLL VENOUS BLD VENIPUNCTURE: CPT

## 2025-01-29 PROCEDURE — 51798 US URINE CAPACITY MEASURE: CPT

## 2025-01-29 PROCEDURE — 83916 OLIGOCLONAL BANDS: CPT

## 2025-01-29 PROCEDURE — 86618 LYME DISEASE ANTIBODY: CPT

## 2025-01-29 PROCEDURE — 99223 1ST HOSP IP/OBS HIGH 75: CPT | Performed by: PSYCHIATRY & NEUROLOGY

## 2025-01-29 PROCEDURE — 71250 CT THORAX DX C-: CPT

## 2025-01-29 PROCEDURE — 6370000000 HC RX 637 (ALT 250 FOR IP)

## 2025-01-29 PROCEDURE — 87205 SMEAR GRAM STAIN: CPT

## 2025-01-29 PROCEDURE — 82550 ASSAY OF CK (CPK): CPT

## 2025-01-29 PROCEDURE — 84157 ASSAY OF PROTEIN OTHER: CPT

## 2025-01-29 PROCEDURE — 6360000002 HC RX W HCPCS: Performed by: INTERNAL MEDICINE

## 2025-01-29 PROCEDURE — 2500000003 HC RX 250 WO HCPCS

## 2025-01-29 PROCEDURE — 87070 CULTURE OTHR SPECIMN AEROBIC: CPT

## 2025-01-29 PROCEDURE — 71045 X-RAY EXAM CHEST 1 VIEW: CPT

## 2025-01-29 PROCEDURE — 2709999900 IR LUMBAR PUNCTURE FOR DIAGNOSIS

## 2025-01-29 PROCEDURE — 6360000002 HC RX W HCPCS

## 2025-01-29 PROCEDURE — 89051 BODY FLUID CELL COUNT: CPT

## 2025-01-29 PROCEDURE — 82040 ASSAY OF SERUM ALBUMIN: CPT

## 2025-01-29 PROCEDURE — 2580000003 HC RX 258: Performed by: INTERNAL MEDICINE

## 2025-01-29 PROCEDURE — 86789 WEST NILE VIRUS ANTIBODY: CPT

## 2025-01-29 PROCEDURE — 99254 IP/OBS CNSLTJ NEW/EST MOD 60: CPT | Performed by: NURSE PRACTITIONER

## 2025-01-29 PROCEDURE — 86592 SYPHILIS TEST NON-TREP QUAL: CPT

## 2025-01-29 PROCEDURE — 3E033XZ INTRODUCTION OF VASOPRESSOR INTO PERIPHERAL VEIN, PERCUTANEOUS APPROACH: ICD-10-PCS | Performed by: PSYCHIATRY & NEUROLOGY

## 2025-01-29 PROCEDURE — 80143 DRUG ASSAY ACETAMINOPHEN: CPT

## 2025-01-29 PROCEDURE — 2500000003 HC RX 250 WO HCPCS: Performed by: NURSE PRACTITIONER

## 2025-01-29 PROCEDURE — 36569 INSJ PICC 5 YR+ W/O IMAGING: CPT

## 2025-01-29 PROCEDURE — 87327 CRYPTOCOCCUS NEOFORM AG IA: CPT

## 2025-01-29 PROCEDURE — 86140 C-REACTIVE PROTEIN: CPT

## 2025-01-29 PROCEDURE — 82945 GLUCOSE OTHER FLUID: CPT

## 2025-01-29 PROCEDURE — 6370000000 HC RX 637 (ALT 250 FOR IP): Performed by: INTERNAL MEDICINE

## 2025-01-29 PROCEDURE — 80048 BASIC METABOLIC PNL TOTAL CA: CPT

## 2025-01-29 PROCEDURE — 83735 ASSAY OF MAGNESIUM: CPT

## 2025-01-29 PROCEDURE — G0545 PR INHERENT VISIT TO INPT: HCPCS | Performed by: INTERNAL MEDICINE

## 2025-01-29 PROCEDURE — 76937 US GUIDE VASCULAR ACCESS: CPT

## 2025-01-29 PROCEDURE — 84145 PROCALCITONIN (PCT): CPT

## 2025-01-29 PROCEDURE — 87506 IADNA-DNA/RNA PROBE TQ 6-11: CPT

## 2025-01-29 PROCEDURE — 87324 CLOSTRIDIUM AG IA: CPT

## 2025-01-29 PROCEDURE — 51702 INSERT TEMP BLADDER CATH: CPT

## 2025-01-29 PROCEDURE — 82042 OTHER SOURCE ALBUMIN QUAN EA: CPT

## 2025-01-29 PROCEDURE — 84295 ASSAY OF SERUM SODIUM: CPT

## 2025-01-29 PROCEDURE — 2000000000 HC ICU R&B

## 2025-01-29 PROCEDURE — 87077 CULTURE AEROBIC IDENTIFY: CPT

## 2025-01-29 PROCEDURE — 82784 ASSAY IGA/IGD/IGG/IGM EACH: CPT

## 2025-01-29 RX ORDER — LORAZEPAM 2 MG/ML
2 INJECTION INTRAMUSCULAR ONCE
Status: DISCONTINUED | OUTPATIENT
Start: 2025-01-29 | End: 2025-01-29

## 2025-01-29 RX ORDER — LORAZEPAM 2 MG/ML
2 INJECTION INTRAMUSCULAR EVERY 4 HOURS PRN
Status: DISCONTINUED | OUTPATIENT
Start: 2025-01-29 | End: 2025-01-31

## 2025-01-29 RX ORDER — MIDODRINE HYDROCHLORIDE 5 MG/1
5 TABLET ORAL ONCE
Status: COMPLETED | OUTPATIENT
Start: 2025-01-29 | End: 2025-01-29

## 2025-01-29 RX ORDER — NOREPINEPHRINE BITARTRATE 0.06 MG/ML
1-100 INJECTION, SOLUTION INTRAVENOUS CONTINUOUS
Status: DISCONTINUED | OUTPATIENT
Start: 2025-01-29 | End: 2025-02-02

## 2025-01-29 RX ORDER — SODIUM CHLORIDE 9 MG/ML
INJECTION, SOLUTION INTRAVENOUS PRN
Status: DISCONTINUED | OUTPATIENT
Start: 2025-01-29 | End: 2025-02-03 | Stop reason: HOSPADM

## 2025-01-29 RX ORDER — DESMOPRESSIN ACETATE 4 UG/ML
1 INJECTION, SOLUTION INTRAVENOUS; SUBCUTANEOUS ONCE
Status: COMPLETED | OUTPATIENT
Start: 2025-01-29 | End: 2025-01-29

## 2025-01-29 RX ORDER — 0.9 % SODIUM CHLORIDE 0.9 %
500 INTRAVENOUS SOLUTION INTRAVENOUS ONCE
Status: COMPLETED | OUTPATIENT
Start: 2025-01-29 | End: 2025-01-29

## 2025-01-29 RX ORDER — LORAZEPAM 2 MG/ML
2 INJECTION INTRAMUSCULAR EVERY 4 HOURS
Status: DISCONTINUED | OUTPATIENT
Start: 2025-01-29 | End: 2025-01-31

## 2025-01-29 RX ORDER — DESMOPRESSIN ACETATE 4 UG/ML
1 INJECTION, SOLUTION INTRAVENOUS; SUBCUTANEOUS ONCE
Status: DISCONTINUED | OUTPATIENT
Start: 2025-01-29 | End: 2025-01-29

## 2025-01-29 RX ORDER — LIDOCAINE HYDROCHLORIDE 10 MG/ML
50 INJECTION, SOLUTION EPIDURAL; INFILTRATION; INTRACAUDAL; PERINEURAL ONCE
Status: DISCONTINUED | OUTPATIENT
Start: 2025-01-29 | End: 2025-02-03 | Stop reason: HOSPADM

## 2025-01-29 RX ORDER — LAMIVUDINE 100 MG/1
100 TABLET, FILM COATED ORAL DAILY
Status: DISCONTINUED | OUTPATIENT
Start: 2025-01-30 | End: 2025-01-30

## 2025-01-29 RX ORDER — METOPROLOL TARTRATE 1 MG/ML
2.5 INJECTION, SOLUTION INTRAVENOUS ONCE
Status: COMPLETED | OUTPATIENT
Start: 2025-01-29 | End: 2025-01-29

## 2025-01-29 RX ORDER — DEXTROSE MONOHYDRATE 50 MG/ML
INJECTION, SOLUTION INTRAVENOUS CONTINUOUS
Status: DISCONTINUED | OUTPATIENT
Start: 2025-01-29 | End: 2025-01-30

## 2025-01-29 RX ORDER — SODIUM CHLORIDE 0.9 % (FLUSH) 0.9 %
5-40 SYRINGE (ML) INJECTION EVERY 12 HOURS SCHEDULED
Status: DISCONTINUED | OUTPATIENT
Start: 2025-01-29 | End: 2025-02-03 | Stop reason: HOSPADM

## 2025-01-29 RX ORDER — SODIUM CHLORIDE 0.9 % (FLUSH) 0.9 %
5-40 SYRINGE (ML) INJECTION PRN
Status: DISCONTINUED | OUTPATIENT
Start: 2025-01-29 | End: 2025-02-03 | Stop reason: HOSPADM

## 2025-01-29 RX ORDER — ALBUMIN (HUMAN) 12.5 G/50ML
25 SOLUTION INTRAVENOUS ONCE
Status: COMPLETED | OUTPATIENT
Start: 2025-01-29 | End: 2025-01-29

## 2025-01-29 RX ORDER — ABACAVIR 300 MG/1
600 TABLET ORAL DAILY
Status: DISCONTINUED | OUTPATIENT
Start: 2025-01-30 | End: 2025-01-30

## 2025-01-29 RX ADMIN — MAGNESIUM SULFATE HEPTAHYDRATE 2000 MG: 40 INJECTION, SOLUTION INTRAVENOUS at 07:04

## 2025-01-29 RX ADMIN — ACETAMINOPHEN 650 MG: 325 TABLET ORAL at 23:33

## 2025-01-29 RX ADMIN — ACYCLOVIR SODIUM 330 MG: 50 INJECTION, SOLUTION INTRAVENOUS at 14:36

## 2025-01-29 RX ADMIN — DESMOPRESSIN ACETATE 1 MCG: 4 INJECTION, SOLUTION INTRAVENOUS; SUBCUTANEOUS at 15:03

## 2025-01-29 RX ADMIN — ATORVASTATIN CALCIUM 20 MG: 20 TABLET, FILM COATED ORAL at 19:33

## 2025-01-29 RX ADMIN — SODIUM CHLORIDE 500 ML: 9 INJECTION, SOLUTION INTRAVENOUS at 07:47

## 2025-01-29 RX ADMIN — SODIUM CHLORIDE, PRESERVATIVE FREE 10 ML: 5 INJECTION INTRAVENOUS at 11:40

## 2025-01-29 RX ADMIN — PIPERACILLIN AND TAZOBACTAM 3375 MG: 3; .375 INJECTION, POWDER, LYOPHILIZED, FOR SOLUTION INTRAVENOUS at 14:33

## 2025-01-29 RX ADMIN — MIDODRINE HYDROCHLORIDE 5 MG: 5 TABLET ORAL at 01:59

## 2025-01-29 RX ADMIN — SODIUM BICARBONATE: 84 INJECTION, SOLUTION INTRAVENOUS at 09:00

## 2025-01-29 RX ADMIN — SODIUM CHLORIDE, PRESERVATIVE FREE 10 ML: 5 INJECTION INTRAVENOUS at 19:39

## 2025-01-29 RX ADMIN — MAGNESIUM SULFATE HEPTAHYDRATE 2000 MG: 40 INJECTION, SOLUTION INTRAVENOUS at 04:54

## 2025-01-29 RX ADMIN — LORAZEPAM 2 MG: 2 INJECTION INTRAMUSCULAR; INTRAVENOUS at 14:29

## 2025-01-29 RX ADMIN — LORAZEPAM 2 MG: 2 INJECTION INTRAMUSCULAR; INTRAVENOUS at 19:33

## 2025-01-29 RX ADMIN — Medication 5 MCG/MIN: at 05:22

## 2025-01-29 RX ADMIN — METOPROLOL TARTRATE 2.5 MG: 1 INJECTION, SOLUTION INTRAVENOUS at 01:50

## 2025-01-29 RX ADMIN — PIPERACILLIN AND TAZOBACTAM 3375 MG: 3; .375 INJECTION, POWDER, LYOPHILIZED, FOR SOLUTION INTRAVENOUS at 22:16

## 2025-01-29 RX ADMIN — LOPERAMIDE HYDROCHLORIDE 2 MG: 2 CAPSULE ORAL at 04:14

## 2025-01-29 RX ADMIN — DEXTROSE MONOHYDRATE: 50 INJECTION, SOLUTION INTRAVENOUS at 14:31

## 2025-01-29 RX ADMIN — LORAZEPAM 2 MG: 2 INJECTION INTRAMUSCULAR; INTRAVENOUS at 09:23

## 2025-01-29 RX ADMIN — LORAZEPAM 2 MG: 2 INJECTION INTRAMUSCULAR; INTRAVENOUS at 22:16

## 2025-01-29 RX ADMIN — ALBUMIN (HUMAN) 25 G: 0.25 INJECTION, SOLUTION INTRAVENOUS at 07:58

## 2025-01-29 RX ADMIN — PIPERACILLIN AND TAZOBACTAM 3375 MG: 3; .375 INJECTION, POWDER, LYOPHILIZED, FOR SOLUTION INTRAVENOUS at 05:33

## 2025-01-29 RX ADMIN — ONDANSETRON 4 MG: 2 INJECTION INTRAMUSCULAR; INTRAVENOUS at 04:15

## 2025-01-29 RX ADMIN — DEXTROSE MONOHYDRATE: 50 INJECTION, SOLUTION INTRAVENOUS at 22:23

## 2025-01-29 RX ADMIN — ACETAMINOPHEN 650 MG: 325 TABLET ORAL at 04:14

## 2025-01-29 NOTE — CARE COORDINATION
ONGOING DISCHARGE PLAN:    Patient is sound asleep at the time of visit. Sitter remains at the bedside.     Writer reviewed chart notes.     Pt. Is from Home w/ Cousin.    VNS, has been denied.     Psych on board.     Remains on Levophed & Bicarb GTT.     Temp today 101.1, Remains on IV Zosyn.ID on board.     NA today 123, MG+ 0.8. Cr+ 3.1. Nephro following.     Will continue to follow for additional discharge needs.    If patient is discharged prior to next notation, then this note serves as note for discharge by case management.    Electronically signed by Rufina Nath RN on 1/29/2025 at 12:57 PM

## 2025-01-29 NOTE — PROCEDURES
PROCEDURE NOTE  Date: 1/29/2025   Name: Gary Jones  YOB: 1985    Procedures      Picc placement order:    Benefits include stable, long term intravenous access. Blood draws. Peripheral vein preservation. Safely deliver vesicant medications.    Risks include failure to obtain the desired result(s) of the procedure, discomfort, injury, the need for additional procedures/therapies, permanent loss of body function, bleeding/bruising, arterial puncture, air embolism, nerve damage, hematoma, phlebitis, catheter fracture/rupture, catheter embolism, catheter occlusion, catheter migration, catheter site infection, unintentional/accidental removal of catheter, bloodstream infection, infiltration, cardiac arrhythmia, vein thrombosis, difficult catheter removal.   Alternatives discussed including centrally inserted central catheter, as well as less invasive procedures such as multiple peripheral IVs, extended dwell catheters and midline placements.     Consent obtained by proceduralist, signed by Patient.      Prescribed therapy: vesicant medications  Peripheral ultrasound assessment done. Plan for right basilic vein insertion.   Product type: Antimicrobial/Antithrombogenic Arrow non tapered power injectable PICC  History/Labs/Allergies Reviewed  Placed By: Jessica Anna RN IV Team  Assistant - Jennifer - -RN  Time out Performed using Two Identifiers  Catheter info: 6 Telugu - triple lumen  Total length: 48 cm  External catheter length: 5 cm  Extremity circumference at site: 31 cm  Number of attempts: 1  Estimated blood loss: 5 ml  Placement verified by: positive blood return & flushes easily  Special equipment used: VPS ECG Tip tracker system, ultrasound, MST, and micro-introducer needle.   Catheter securement: Adhesive securement device  Catheter adhesive (SecureportIV) utilized at insertion site  Dressing applied: Tegaderm CHG  Lidocaine administered intradermally conc.1%: Approx 2 mL    RN aware picc placed

## 2025-01-30 ENCOUNTER — APPOINTMENT (OUTPATIENT)
Age: 40
DRG: 974 | End: 2025-01-30
Attending: INTERNAL MEDICINE
Payer: COMMERCIAL

## 2025-01-30 LAB
ABSOLUTE BANDS: 1.61 K/UL (ref 0–1)
ALBUMIN CSF-MCNC: 234 MG/L (ref 70–350)
ALBUMIN INDEX: 65
ALBUMIN SERPL-MCNC: 3.6 G/DL (ref 3.5–5.2)
ANION GAP SERPL CALCULATED.3IONS-SCNC: 10 MMOL/L (ref 9–16)
BANDS: 23 % (ref 0–10)
BASOPHILS # BLD: 0.14 K/UL (ref 0–0.2)
BASOPHILS NFR BLD: 2 % (ref 0–2)
BUN SERPL-MCNC: 12 MG/DL (ref 6–20)
CALCIUM SERPL-MCNC: 7.7 MG/DL (ref 8.6–10.4)
CAMPYLOBACTER DNA SPEC NAA+PROBE: ABNORMAL
CHLORIDE SERPL-SCNC: 101 MMOL/L (ref 98–107)
CO2 SERPL-SCNC: 21 MMOL/L (ref 20–31)
CREAT SERPL-MCNC: 1.2 MG/DL (ref 0.7–1.2)
CRYPTOC AG CSF QL: NEGATIVE
ECHO AO ROOT DIAM: 3.2 CM
ECHO AO ROOT INDEX: 1.85 CM/M2
ECHO AV AREA PEAK VELOCITY: 2.2 CM2
ECHO AV AREA VTI: 1.8 CM2
ECHO AV AREA/BSA PEAK VELOCITY: 1.3 CM2/M2
ECHO AV AREA/BSA VTI: 1 CM2/M2
ECHO AV MEAN GRADIENT: 3 MMHG
ECHO AV MEAN VELOCITY: 0.8 M/S
ECHO AV PEAK GRADIENT: 5 MMHG
ECHO AV PEAK VELOCITY: 1.2 M/S
ECHO AV VELOCITY RATIO: 0.67
ECHO AV VTI: 23.4 CM
ECHO BSA: 1.74 M2
ECHO EST RA PRESSURE: 15 MMHG
ECHO LA AREA 4C: 11.7 CM2
ECHO LA DIAMETER INDEX: 1.45 CM/M2
ECHO LA DIAMETER: 2.5 CM
ECHO LA MAJOR AXIS: 4.8 CM
ECHO LA TO AORTIC ROOT RATIO: 0.78
ECHO LA VOL MOD A4C: 22 ML (ref 18–58)
ECHO LA VOLUME INDEX MOD A4C: 13 ML/M2 (ref 16–34)
ECHO LV E' LATERAL VELOCITY: 11.9 CM/S
ECHO LV E' SEPTAL VELOCITY: 9.25 CM/S
ECHO LV EDV A2C: 121 ML
ECHO LV EDV A4C: 117 ML
ECHO LV EDV INDEX A4C: 68 ML/M2
ECHO LV EDV NDEX A2C: 70 ML/M2
ECHO LV EF PHYSICIAN: 40 %
ECHO LV EJECTION FRACTION A2C: 47 %
ECHO LV EJECTION FRACTION A4C: 42 %
ECHO LV EJECTION FRACTION BIPLANE: 44 % (ref 55–100)
ECHO LV ESV A2C: 65 ML
ECHO LV ESV A4C: 68 ML
ECHO LV ESV INDEX A2C: 38 ML/M2
ECHO LV ESV INDEX A4C: 39 ML/M2
ECHO LV FRACTIONAL SHORTENING: 20 % (ref 28–44)
ECHO LV GLOBAL LONGITUDINAL STRAIN (GLS): -9 %
ECHO LV INTERNAL DIMENSION DIASTOLE INDEX: 2.6 CM/M2
ECHO LV INTERNAL DIMENSION DIASTOLIC: 4.5 CM (ref 4.2–5.9)
ECHO LV INTERNAL DIMENSION SYSTOLIC INDEX: 2.08 CM/M2
ECHO LV INTERNAL DIMENSION SYSTOLIC: 3.6 CM
ECHO LV IVSD: 0.9 CM (ref 0.6–1)
ECHO LV MASS 2D: 132.8 G (ref 88–224)
ECHO LV MASS INDEX 2D: 76.8 G/M2 (ref 49–115)
ECHO LV POSTERIOR WALL DIASTOLIC: 0.9 CM (ref 0.6–1)
ECHO LV RELATIVE WALL THICKNESS RATIO: 0.4
ECHO LVOT AREA: 3.1 CM2
ECHO LVOT AV VTI INDEX: 0.59
ECHO LVOT DIAM: 2 CM
ECHO LVOT MEAN GRADIENT: 1 MMHG
ECHO LVOT PEAK GRADIENT: 3 MMHG
ECHO LVOT PEAK VELOCITY: 0.8 M/S
ECHO LVOT STROKE VOLUME INDEX: 24.9 ML/M2
ECHO LVOT SV: 43 ML
ECHO LVOT VTI: 13.7 CM
ECHO MV A VELOCITY: 0.76 M/S
ECHO MV E DECELERATION TIME (DT): 176 MS
ECHO MV E VELOCITY: 0.95 M/S
ECHO MV E/A RATIO: 1.25
ECHO MV E/E' LATERAL: 7.98
ECHO MV E/E' RATIO (AVERAGED): 9.13
ECHO MV E/E' SEPTAL: 10.27
ECHO RA AREA 4C: 13.7 CM2
ECHO RA END SYSTOLIC VOLUME APICAL 4 CHAMBER INDEX BSA: 16 ML/M2
ECHO RA VOLUME: 27 ML
ECHO RIGHT VENTRICULAR SYSTOLIC PRESSURE (RVSP): 36 MMHG
ECHO RV TAPSE: 1.9 CM (ref 1.7–?)
ECHO TV REGURGITANT MAX VELOCITY: 2.3 M/S
ECHO TV REGURGITANT PEAK GRADIENT: 21 MMHG
EOSINOPHIL # BLD: 0 K/UL (ref 0–0.4)
EOSINOPHILS RELATIVE PERCENT: 0 % (ref 0–4)
ERYTHROCYTE [DISTWIDTH] IN BLOOD BY AUTOMATED COUNT: 14.1 % (ref 11.5–14.9)
ETEC ELTA+ESTB GENES STL QL NAA+PROBE: ABNORMAL
GFR, ESTIMATED: 79 ML/MIN/1.73M2
GLUCOSE SERPL-MCNC: 146 MG/DL (ref 74–99)
HCT VFR BLD AUTO: 34.2 % (ref 41–53)
HGB BLD-MCNC: 11.6 G/DL (ref 13.5–17.5)
IGG CSF-MCNC: 5 MG/DL (ref 1–3)
IGG INDEX CSF: 0.67
IGG SERPL-MCNC: 1152 MG/DL (ref 700–1600)
IGG SYNTHESIS RATE CSF: 4.1 MG/24 H
LYMPHOCYTES NFR BLD: 0.98 K/UL (ref 1–4.8)
LYMPHOCYTES RELATIVE PERCENT: 14 % (ref 24–44)
MAGNESIUM SERPL-MCNC: 2.6 MG/DL (ref 1.6–2.6)
MCH RBC QN AUTO: 34.9 PG (ref 26–34)
MCHC RBC AUTO-ENTMCNC: 34 G/DL (ref 31–37)
MCV RBC AUTO: 102.6 FL (ref 80–100)
METAMYELOCYTES ABSOLUTE COUNT: 0.49 K/UL
METAMYELOCYTES: 7 %
MONOCYTES NFR BLD: 0.84 K/UL (ref 0.1–1.3)
MONOCYTES NFR BLD: 12 % (ref 1–7)
MORPHOLOGY: ABNORMAL
NEUTROPHILS NFR BLD: 42 % (ref 36–66)
NEUTS SEG NFR BLD: 2.94 K/UL (ref 1.3–9.1)
OLIGOCLONAL BANDS: ABNORMAL
OSMOLALITY UR: 248 MOSM/KG (ref 80–1300)
P SHIGELLOIDES DNA STL QL NAA+PROBE: ABNORMAL
PLATELET # BLD AUTO: 172 K/UL (ref 150–450)
PMV BLD AUTO: 7.3 FL (ref 6–12)
POTASSIUM SERPL-SCNC: 3.1 MMOL/L (ref 3.7–5.3)
RBC # BLD AUTO: 3.33 M/UL (ref 4.5–5.9)
SALMONELLA DNA SPEC QL NAA+PROBE: ABNORMAL
SHIGA TOXIN STX GENE SPEC NAA+PROBE: ABNORMAL
SHIGELLA DNA SPEC QL NAA+PROBE: ABNORMAL
SODIUM SERPL-SCNC: 131 MMOL/L (ref 136–145)
SODIUM SERPL-SCNC: 132 MMOL/L (ref 136–145)
SODIUM SERPL-SCNC: 132 MMOL/L (ref 136–145)
SODIUM SERPL-SCNC: 134 MMOL/L (ref 136–145)
SPECIMEN DESCRIPTION: ABNORMAL
V CHOL+PARA RFBL+TRKH+TNAA STL QL NAA+PR: ABNORMAL
WBC OTHER # BLD: 7 K/UL (ref 3.5–11)
Y ENTERO RECN STL QL NAA+PROBE: ABNORMAL

## 2025-01-30 PROCEDURE — 6370000000 HC RX 637 (ALT 250 FOR IP): Performed by: INTERNAL MEDICINE

## 2025-01-30 PROCEDURE — 6360000002 HC RX W HCPCS

## 2025-01-30 PROCEDURE — 99232 SBSQ HOSP IP/OBS MODERATE 35: CPT | Performed by: PSYCHIATRY & NEUROLOGY

## 2025-01-30 PROCEDURE — 2500000003 HC RX 250 WO HCPCS

## 2025-01-30 PROCEDURE — 2580000003 HC RX 258: Performed by: INTERNAL MEDICINE

## 2025-01-30 PROCEDURE — 80048 BASIC METABOLIC PNL TOTAL CA: CPT

## 2025-01-30 PROCEDURE — 99233 SBSQ HOSP IP/OBS HIGH 50: CPT | Performed by: INTERNAL MEDICINE

## 2025-01-30 PROCEDURE — 6360000002 HC RX W HCPCS: Performed by: NURSE PRACTITIONER

## 2025-01-30 PROCEDURE — 83735 ASSAY OF MAGNESIUM: CPT

## 2025-01-30 PROCEDURE — 6360000004 HC RX CONTRAST MEDICATION: Performed by: INTERNAL MEDICINE

## 2025-01-30 PROCEDURE — C8929 TTE W OR WO FOL WCON,DOPPLER: HCPCS

## 2025-01-30 PROCEDURE — 84295 ASSAY OF SERUM SODIUM: CPT

## 2025-01-30 PROCEDURE — 2500000003 HC RX 250 WO HCPCS: Performed by: INTERNAL MEDICINE

## 2025-01-30 PROCEDURE — 2060000000 HC ICU INTERMEDIATE R&B

## 2025-01-30 PROCEDURE — 6360000002 HC RX W HCPCS: Performed by: INTERNAL MEDICINE

## 2025-01-30 PROCEDURE — 99233 SBSQ HOSP IP/OBS HIGH 50: CPT | Performed by: PSYCHIATRY & NEUROLOGY

## 2025-01-30 PROCEDURE — 6370000000 HC RX 637 (ALT 250 FOR IP): Performed by: PSYCHIATRY & NEUROLOGY

## 2025-01-30 PROCEDURE — G0545 PR INHERENT VISIT TO INPT: HCPCS | Performed by: INTERNAL MEDICINE

## 2025-01-30 PROCEDURE — 85025 COMPLETE CBC W/AUTO DIFF WBC: CPT

## 2025-01-30 PROCEDURE — 6370000000 HC RX 637 (ALT 250 FOR IP)

## 2025-01-30 PROCEDURE — 2580000003 HC RX 258: Performed by: NURSE PRACTITIONER

## 2025-01-30 PROCEDURE — 93306 TTE W/DOPPLER COMPLETE: CPT | Performed by: INTERNAL MEDICINE

## 2025-01-30 PROCEDURE — 2000000000 HC ICU R&B

## 2025-01-30 PROCEDURE — 36415 COLL VENOUS BLD VENIPUNCTURE: CPT

## 2025-01-30 RX ORDER — POTASSIUM CHLORIDE 7.45 MG/ML
10 INJECTION INTRAVENOUS PRN
Status: DISCONTINUED | OUTPATIENT
Start: 2025-01-30 | End: 2025-02-03 | Stop reason: HOSPADM

## 2025-01-30 RX ORDER — POTASSIUM CHLORIDE 7.45 MG/ML
10 INJECTION INTRAVENOUS
Status: DISPENSED | OUTPATIENT
Start: 2025-01-30 | End: 2025-01-30

## 2025-01-30 RX ORDER — POTASSIUM CHLORIDE 1500 MG/1
40 TABLET, EXTENDED RELEASE ORAL PRN
Status: DISCONTINUED | OUTPATIENT
Start: 2025-01-30 | End: 2025-02-03 | Stop reason: HOSPADM

## 2025-01-30 RX ORDER — SODIUM CHLORIDE 9 MG/ML
INJECTION, SOLUTION INTRAVENOUS CONTINUOUS
Status: DISCONTINUED | OUTPATIENT
Start: 2025-01-30 | End: 2025-01-31

## 2025-01-30 RX ORDER — SODIUM CHLORIDE 1 G/1
1 TABLET ORAL
Status: DISCONTINUED | OUTPATIENT
Start: 2025-01-30 | End: 2025-01-30

## 2025-01-30 RX ORDER — FENTANYL CITRATE 0.05 MG/ML
25 INJECTION, SOLUTION INTRAMUSCULAR; INTRAVENOUS ONCE
Status: COMPLETED | OUTPATIENT
Start: 2025-01-30 | End: 2025-01-30

## 2025-01-30 RX ORDER — LAMIVUDINE 100 MG/1
300 TABLET, FILM COATED ORAL DAILY
Status: DISCONTINUED | OUTPATIENT
Start: 2025-01-31 | End: 2025-02-03 | Stop reason: HOSPADM

## 2025-01-30 RX ORDER — ABACAVIR 300 MG/1
600 TABLET ORAL DAILY
Status: DISCONTINUED | OUTPATIENT
Start: 2025-01-31 | End: 2025-02-03 | Stop reason: HOSPADM

## 2025-01-30 RX ORDER — DEXMEDETOMIDINE HYDROCHLORIDE 4 UG/ML
.1-1.5 INJECTION, SOLUTION INTRAVENOUS CONTINUOUS
Status: DISCONTINUED | OUTPATIENT
Start: 2025-01-30 | End: 2025-02-02

## 2025-01-30 RX ADMIN — ACYCLOVIR SODIUM 330 MG: 50 INJECTION, SOLUTION INTRAVENOUS at 10:18

## 2025-01-30 RX ADMIN — POTASSIUM CHLORIDE 10 MEQ: 7.46 INJECTION, SOLUTION INTRAVENOUS at 04:51

## 2025-01-30 RX ADMIN — POTASSIUM CHLORIDE 10 MEQ: 7.46 INJECTION, SOLUTION INTRAVENOUS at 08:15

## 2025-01-30 RX ADMIN — SODIUM CHLORIDE, PRESERVATIVE FREE 40 MG: 5 INJECTION INTRAVENOUS at 22:51

## 2025-01-30 RX ADMIN — SULFUR HEXAFLUORIDE 2 ML: KIT at 16:14

## 2025-01-30 RX ADMIN — POTASSIUM CHLORIDE 10 MEQ: 7.46 INJECTION, SOLUTION INTRAVENOUS at 07:06

## 2025-01-30 RX ADMIN — LORAZEPAM 2 MG: 2 INJECTION INTRAMUSCULAR; INTRAVENOUS at 17:30

## 2025-01-30 RX ADMIN — ACYCLOVIR SODIUM 330 MG: 50 INJECTION, SOLUTION INTRAVENOUS at 01:17

## 2025-01-30 RX ADMIN — ATORVASTATIN CALCIUM 20 MG: 20 TABLET, FILM COATED ORAL at 20:05

## 2025-01-30 RX ADMIN — LORAZEPAM 2 MG: 2 INJECTION INTRAMUSCULAR; INTRAVENOUS at 01:13

## 2025-01-30 RX ADMIN — FOLIC ACID 1 MG: 1 TABLET ORAL at 08:12

## 2025-01-30 RX ADMIN — SODIUM CHLORIDE, PRESERVATIVE FREE 10 ML: 5 INJECTION INTRAVENOUS at 08:15

## 2025-01-30 RX ADMIN — SODIUM CHLORIDE, PRESERVATIVE FREE 10 ML: 5 INJECTION INTRAVENOUS at 20:11

## 2025-01-30 RX ADMIN — ACETAMINOPHEN 650 MG: 325 TABLET ORAL at 20:25

## 2025-01-30 RX ADMIN — ONDANSETRON 4 MG: 2 INJECTION INTRAMUSCULAR; INTRAVENOUS at 21:05

## 2025-01-30 RX ADMIN — DEXMEDETOMIDINE HYDROCHLORIDE 0.2 MCG/KG/HR: 400 INJECTION INTRAVENOUS at 23:16

## 2025-01-30 RX ADMIN — ENOXAPARIN SODIUM 40 MG: 100 INJECTION SUBCUTANEOUS at 10:08

## 2025-01-30 RX ADMIN — DOLUTEGRAVIR SODIUM 50 MG: 50 TABLET, FILM COATED ORAL at 08:12

## 2025-01-30 RX ADMIN — OLANZAPINE 15 MG: 15 TABLET, FILM COATED ORAL at 21:45

## 2025-01-30 RX ADMIN — LORAZEPAM 2 MG: 2 INJECTION INTRAMUSCULAR; INTRAVENOUS at 13:53

## 2025-01-30 RX ADMIN — FENTANYL CITRATE 25 MCG: 0.05 INJECTION, SOLUTION INTRAMUSCULAR; INTRAVENOUS at 21:11

## 2025-01-30 RX ADMIN — CEFTRIAXONE SODIUM 2000 MG: 2 INJECTION, POWDER, FOR SOLUTION INTRAMUSCULAR; INTRAVENOUS at 17:31

## 2025-01-30 RX ADMIN — LORAZEPAM 2 MG: 2 INJECTION INTRAMUSCULAR; INTRAVENOUS at 05:55

## 2025-01-30 RX ADMIN — PIPERACILLIN AND TAZOBACTAM 3375 MG: 3; .375 INJECTION, POWDER, LYOPHILIZED, FOR SOLUTION INTRAVENOUS at 13:51

## 2025-01-30 RX ADMIN — LORAZEPAM 2 MG: 2 INJECTION INTRAMUSCULAR; INTRAVENOUS at 20:26

## 2025-01-30 RX ADMIN — THIAMINE HCL TAB 100 MG 100 MG: 100 TAB at 08:11

## 2025-01-30 RX ADMIN — SODIUM CHLORIDE, PRESERVATIVE FREE 10 ML: 5 INJECTION INTRAVENOUS at 20:07

## 2025-01-30 RX ADMIN — LORAZEPAM 2 MG: 2 INJECTION INTRAMUSCULAR; INTRAVENOUS at 22:21

## 2025-01-30 RX ADMIN — ABACAVIR 600 MG: 300 TABLET, FILM COATED ORAL at 08:12

## 2025-01-30 RX ADMIN — SODIUM CHLORIDE: 9 INJECTION, SOLUTION INTRAVENOUS at 04:48

## 2025-01-30 RX ADMIN — SODIUM CHLORIDE: 9 INJECTION, SOLUTION INTRAVENOUS at 13:55

## 2025-01-30 RX ADMIN — PIPERACILLIN AND TAZOBACTAM 3375 MG: 3; .375 INJECTION, POWDER, LYOPHILIZED, FOR SOLUTION INTRAVENOUS at 05:53

## 2025-01-30 RX ADMIN — LORAZEPAM 2 MG: 2 INJECTION INTRAMUSCULAR; INTRAVENOUS at 21:37

## 2025-01-30 RX ADMIN — LORAZEPAM 2 MG: 2 INJECTION INTRAMUSCULAR; INTRAVENOUS at 10:19

## 2025-01-30 RX ADMIN — LOPERAMIDE HYDROCHLORIDE 2 MG: 2 CAPSULE ORAL at 22:15

## 2025-01-30 RX ADMIN — LAMIVUDINE 100 MG: 100 TABLET, FILM COATED ORAL at 08:12

## 2025-01-30 RX ADMIN — ONDANSETRON 4 MG: 2 INJECTION INTRAMUSCULAR; INTRAVENOUS at 08:19

## 2025-01-30 RX ADMIN — POTASSIUM CHLORIDE 10 MEQ: 7.46 INJECTION, SOLUTION INTRAVENOUS at 05:54

## 2025-01-30 RX ADMIN — SODIUM CHLORIDE: 9 INJECTION, SOLUTION INTRAVENOUS at 22:15

## 2025-01-30 ASSESSMENT — PAIN DESCRIPTION - DESCRIPTORS
DESCRIPTORS: ACHING

## 2025-01-30 ASSESSMENT — PAIN DESCRIPTION - FREQUENCY: FREQUENCY: CONTINUOUS

## 2025-01-30 ASSESSMENT — PAIN DESCRIPTION - LOCATION
LOCATION: ABDOMEN

## 2025-01-30 ASSESSMENT — PAIN DESCRIPTION - PAIN TYPE: TYPE: ACUTE PAIN

## 2025-01-30 ASSESSMENT — PAIN - FUNCTIONAL ASSESSMENT: PAIN_FUNCTIONAL_ASSESSMENT: ACTIVITIES ARE NOT PREVENTED

## 2025-01-30 ASSESSMENT — PAIN SCALES - GENERAL
PAINLEVEL_OUTOF10: 5
PAINLEVEL_OUTOF10: 6
PAINLEVEL_OUTOF10: 6

## 2025-01-30 ASSESSMENT — PAIN DESCRIPTION - ORIENTATION: ORIENTATION: MID

## 2025-01-30 NOTE — CARE COORDINATION
ONGOING DISCHARGE PLAN:    Patient is sound asleep at the time of visit. Sitter remains at the bedside.     Writer reviewed chart notes.      Pt. Is from Home w/ Cousin.     VNS, has been denied.      Psych on board.     HX of Alcohol Abuse.     ID continues to follow. Temp this am, 100.4.     Labs Improving, NA today, 132, MG+ 2.6, CR 1.2. Nephro following.     Remains on Levophed.     Pt. Had Lumbar Puncture Yesterday.     MRI Brain ordered.     Will continue to follow for additional discharge needs.    If patient is discharged prior to next notation, then this note serves as note for discharge by case management.    Electronically signed by Rufina Nath RN on 1/30/2025 at 1:39 PM

## 2025-01-31 ENCOUNTER — APPOINTMENT (OUTPATIENT)
Dept: MRI IMAGING | Age: 40
DRG: 974 | End: 2025-01-31
Payer: COMMERCIAL

## 2025-01-31 LAB
AMMONIA PLAS-SCNC: 79 UMOL/L (ref 16–60)
ANION GAP SERPL CALCULATED.3IONS-SCNC: 8 MMOL/L (ref 9–16)
BASOPHILS # BLD: 0 K/UL (ref 0–0.2)
BASOPHILS NFR BLD: 0 % (ref 0–2)
BUN SERPL-MCNC: 7 MG/DL (ref 6–20)
CALCIUM SERPL-MCNC: 8.6 MG/DL (ref 8.6–10.4)
CHLORIDE SERPL-SCNC: 112 MMOL/L (ref 98–107)
CO2 SERPL-SCNC: 20 MMOL/L (ref 20–31)
CORTIS SERPL-MCNC: 7.7 UG/DL (ref 2.5–19.5)
CREAT SERPL-MCNC: 0.7 MG/DL (ref 0.7–1.2)
EOSINOPHIL # BLD: 0 K/UL (ref 0–0.4)
EOSINOPHILS RELATIVE PERCENT: 1 % (ref 0–4)
ERYTHROCYTE [DISTWIDTH] IN BLOOD BY AUTOMATED COUNT: 14.1 % (ref 11.5–14.9)
GFR, ESTIMATED: >90 ML/MIN/1.73M2
GLUCOSE SERPL-MCNC: 144 MG/DL (ref 74–99)
HCT VFR BLD AUTO: 34.5 % (ref 41–53)
HGB BLD-MCNC: 11.6 G/DL (ref 13.5–17.5)
LYMPHOCYTES NFR BLD: 0.8 K/UL (ref 1–4.8)
LYMPHOCYTES RELATIVE PERCENT: 17 % (ref 24–44)
MCH RBC QN AUTO: 35.2 PG (ref 26–34)
MCHC RBC AUTO-ENTMCNC: 33.6 G/DL (ref 31–37)
MCV RBC AUTO: 104.8 FL (ref 80–100)
MICROORGANISM SPEC CULT: NORMAL
MONOCYTES NFR BLD: 0.5 K/UL (ref 0.1–1.3)
MONOCYTES NFR BLD: 10 % (ref 1–7)
NEUTROPHILS NFR BLD: 72 % (ref 36–66)
NEUTS SEG NFR BLD: 3.3 K/UL (ref 1.3–9.1)
PLATELET # BLD AUTO: 163 K/UL (ref 150–450)
PMV BLD AUTO: 7.3 FL (ref 6–12)
POTASSIUM SERPL-SCNC: 3.9 MMOL/L (ref 3.7–5.3)
RBC # BLD AUTO: 3.29 M/UL (ref 4.5–5.9)
SODIUM SERPL-SCNC: 138 MMOL/L (ref 136–145)
SODIUM SERPL-SCNC: 140 MMOL/L (ref 136–145)
SODIUM SERPL-SCNC: 141 MMOL/L (ref 136–145)
SPECIMEN DESCRIPTION: NORMAL
VDRL CSF QL: NONREACTIVE
WBC OTHER # BLD: 4.6 K/UL (ref 3.5–11)

## 2025-01-31 PROCEDURE — 80048 BASIC METABOLIC PNL TOTAL CA: CPT

## 2025-01-31 PROCEDURE — 99291 CRITICAL CARE FIRST HOUR: CPT | Performed by: INTERNAL MEDICINE

## 2025-01-31 PROCEDURE — 6370000000 HC RX 637 (ALT 250 FOR IP): Performed by: PSYCHIATRY & NEUROLOGY

## 2025-01-31 PROCEDURE — 99233 SBSQ HOSP IP/OBS HIGH 50: CPT | Performed by: INTERNAL MEDICINE

## 2025-01-31 PROCEDURE — 99232 SBSQ HOSP IP/OBS MODERATE 35: CPT | Performed by: PSYCHIATRY & NEUROLOGY

## 2025-01-31 PROCEDURE — A9579 GAD-BASE MR CONTRAST NOS,1ML: HCPCS | Performed by: PSYCHIATRY & NEUROLOGY

## 2025-01-31 PROCEDURE — 2060000000 HC ICU INTERMEDIATE R&B

## 2025-01-31 PROCEDURE — 2500000003 HC RX 250 WO HCPCS: Performed by: INTERNAL MEDICINE

## 2025-01-31 PROCEDURE — 82533 TOTAL CORTISOL: CPT

## 2025-01-31 PROCEDURE — 70553 MRI BRAIN STEM W/O & W/DYE: CPT

## 2025-01-31 PROCEDURE — 85025 COMPLETE CBC W/AUTO DIFF WBC: CPT

## 2025-01-31 PROCEDURE — 82140 ASSAY OF AMMONIA: CPT

## 2025-01-31 PROCEDURE — 2500000003 HC RX 250 WO HCPCS: Performed by: PSYCHIATRY & NEUROLOGY

## 2025-01-31 PROCEDURE — 2580000003 HC RX 258: Performed by: INTERNAL MEDICINE

## 2025-01-31 PROCEDURE — 6360000004 HC RX CONTRAST MEDICATION: Performed by: PSYCHIATRY & NEUROLOGY

## 2025-01-31 PROCEDURE — 6360000002 HC RX W HCPCS: Performed by: NURSE PRACTITIONER

## 2025-01-31 PROCEDURE — 36415 COLL VENOUS BLD VENIPUNCTURE: CPT

## 2025-01-31 PROCEDURE — 6370000000 HC RX 637 (ALT 250 FOR IP): Performed by: INTERNAL MEDICINE

## 2025-01-31 PROCEDURE — 6360000002 HC RX W HCPCS: Performed by: INTERNAL MEDICINE

## 2025-01-31 PROCEDURE — 84295 ASSAY OF SERUM SODIUM: CPT

## 2025-01-31 PROCEDURE — 2580000003 HC RX 258: Performed by: NURSE PRACTITIONER

## 2025-01-31 PROCEDURE — 2580000003 HC RX 258

## 2025-01-31 PROCEDURE — 2500000003 HC RX 250 WO HCPCS

## 2025-01-31 PROCEDURE — 6370000000 HC RX 637 (ALT 250 FOR IP)

## 2025-01-31 PROCEDURE — G0545 PR INHERENT VISIT TO INPT: HCPCS | Performed by: INTERNAL MEDICINE

## 2025-01-31 RX ORDER — LORAZEPAM 2 MG/ML
2 INJECTION INTRAMUSCULAR EVERY 4 HOURS PRN
Status: DISCONTINUED | OUTPATIENT
Start: 2025-01-31 | End: 2025-01-31

## 2025-01-31 RX ORDER — SODIUM CHLORIDE 0.9 % (FLUSH) 0.9 %
10 SYRINGE (ML) INJECTION PRN
Status: DISCONTINUED | OUTPATIENT
Start: 2025-01-31 | End: 2025-02-03 | Stop reason: HOSPADM

## 2025-01-31 RX ORDER — DEXTROSE MONOHYDRATE 50 MG/ML
INJECTION, SOLUTION INTRAVENOUS CONTINUOUS
Status: DISCONTINUED | OUTPATIENT
Start: 2025-01-31 | End: 2025-01-31

## 2025-01-31 RX ADMIN — LAMIVUDINE 300 MG: 100 TABLET, FILM COATED ORAL at 09:50

## 2025-01-31 RX ADMIN — SODIUM CHLORIDE, PRESERVATIVE FREE 10 ML: 5 INJECTION INTRAVENOUS at 16:13

## 2025-01-31 RX ADMIN — OLANZAPINE 15 MG: 15 TABLET, FILM COATED ORAL at 22:18

## 2025-01-31 RX ADMIN — DEXMEDETOMIDINE HYDROCHLORIDE 0.4 MCG/KG/HR: 400 INJECTION INTRAVENOUS at 07:02

## 2025-01-31 RX ADMIN — SODIUM CHLORIDE, PRESERVATIVE FREE 10 ML: 5 INJECTION INTRAVENOUS at 21:11

## 2025-01-31 RX ADMIN — LORAZEPAM 2 MG: 2 INJECTION INTRAMUSCULAR; INTRAVENOUS at 07:01

## 2025-01-31 RX ADMIN — CEFTRIAXONE SODIUM 2000 MG: 2 INJECTION, POWDER, FOR SOLUTION INTRAMUSCULAR; INTRAVENOUS at 18:27

## 2025-01-31 RX ADMIN — LOPERAMIDE HYDROCHLORIDE 2 MG: 2 CAPSULE ORAL at 22:25

## 2025-01-31 RX ADMIN — LORAZEPAM 2 MG: 2 INJECTION INTRAMUSCULAR; INTRAVENOUS at 03:30

## 2025-01-31 RX ADMIN — ATORVASTATIN CALCIUM 20 MG: 20 TABLET, FILM COATED ORAL at 22:18

## 2025-01-31 RX ADMIN — DEXTROSE MONOHYDRATE: 50 INJECTION, SOLUTION INTRAVENOUS at 07:27

## 2025-01-31 RX ADMIN — LOPERAMIDE HYDROCHLORIDE 2 MG: 2 CAPSULE ORAL at 12:42

## 2025-01-31 RX ADMIN — DEXTROSE MONOHYDRATE: 50 INJECTION, SOLUTION INTRAVENOUS at 20:57

## 2025-01-31 RX ADMIN — SODIUM CHLORIDE, PRESERVATIVE FREE 40 MG: 5 INJECTION INTRAVENOUS at 11:57

## 2025-01-31 RX ADMIN — DOLUTEGRAVIR SODIUM 50 MG: 50 TABLET, FILM COATED ORAL at 09:49

## 2025-01-31 RX ADMIN — SODIUM CHLORIDE, PRESERVATIVE FREE 10 ML: 5 INJECTION INTRAVENOUS at 07:55

## 2025-01-31 RX ADMIN — SODIUM CHLORIDE, PRESERVATIVE FREE 40 MG: 5 INJECTION INTRAVENOUS at 22:18

## 2025-01-31 RX ADMIN — ABACAVIR SULFATE 600 MG: 300 TABLET, FILM COATED ORAL at 09:49

## 2025-01-31 RX ADMIN — DEXTROSE MONOHYDRATE: 50 INJECTION, SOLUTION INTRAVENOUS at 21:12

## 2025-01-31 RX ADMIN — GADOTERIDOL 13 ML: 279.3 INJECTION, SOLUTION INTRAVENOUS at 16:14

## 2025-01-31 ASSESSMENT — PAIN SCALES - GENERAL: PAINLEVEL_OUTOF10: 0

## 2025-01-31 NOTE — CARE COORDINATION
ONGOING DISCHARGE PLAN:    Chart Review    GI Panel-Shigella Sp positive. Echo EF 35-40%. FMS. Diaz. Temp this morning 95.9. Locked self in bathroom last night. Kept attempting to get up and leave. Pulling at lines. Precedex gtt.     Will continue to follow for additional discharge needs.    If patient is discharged prior to next notation, then this note serves as note for discharge by case management.    Electronically signed by Lashaun Carrera RN on 1/31/2025 at 1:06 PM

## 2025-02-01 ENCOUNTER — APPOINTMENT (OUTPATIENT)
Dept: GENERAL RADIOLOGY | Age: 40
DRG: 974 | End: 2025-02-01
Payer: COMMERCIAL

## 2025-02-01 PROBLEM — K62.5 BRBPR (BRIGHT RED BLOOD PER RECTUM): Status: ACTIVE | Noted: 2025-02-01

## 2025-02-01 PROBLEM — A03.9 SHIGELLOSIS: Status: ACTIVE | Noted: 2025-02-01

## 2025-02-01 PROBLEM — F10.20 ALCOHOLISM (HCC): Status: ACTIVE | Noted: 2024-05-07

## 2025-02-01 PROBLEM — K52.9 COLITIS: Status: ACTIVE | Noted: 2025-02-01

## 2025-02-01 PROBLEM — R19.7 DIARRHEA: Status: ACTIVE | Noted: 2025-02-01

## 2025-02-01 LAB
AMMONIA PLAS-SCNC: 58 UMOL/L (ref 16–60)
ANION GAP SERPL CALCULATED.3IONS-SCNC: 10 MMOL/L (ref 9–16)
B BURGDOR AB CSF IA-ACNC: 0.39 IV
BACTERIA URNS QL MICRO: ABNORMAL
BILIRUB UR QL STRIP: NEGATIVE
BUN SERPL-MCNC: 5 MG/DL (ref 6–20)
CALCIUM SERPL-MCNC: 8.5 MG/DL (ref 8.6–10.4)
CASTS #/AREA URNS LPF: ABNORMAL /LPF
CHLORIDE SERPL-SCNC: 112 MMOL/L (ref 98–107)
CLARITY UR: CLEAR
CO2 SERPL-SCNC: 18 MMOL/L (ref 20–31)
COLOR UR: YELLOW
CREAT SERPL-MCNC: 0.9 MG/DL (ref 0.7–1.2)
EPI CELLS #/AREA URNS HPF: ABNORMAL /HPF
ERYTHROCYTE [DISTWIDTH] IN BLOOD BY AUTOMATED COUNT: 14.5 % (ref 11.5–14.9)
GFR, ESTIMATED: >90 ML/MIN/1.73M2
GLUCOSE SERPL-MCNC: 156 MG/DL (ref 74–99)
GLUCOSE UR STRIP-MCNC: ABNORMAL MG/DL
HCT VFR BLD AUTO: 34.6 % (ref 41–53)
HGB BLD-MCNC: 12.1 G/DL (ref 13.5–17.5)
HGB UR QL STRIP.AUTO: NEGATIVE
KETONES UR STRIP-MCNC: NEGATIVE MG/DL
LACTATE BLDV-SCNC: 1.2 MMOL/L (ref 0.5–2.2)
LEUKOCYTE ESTERASE UR QL STRIP: NEGATIVE
MCH RBC QN AUTO: 36.8 PG (ref 26–34)
MCHC RBC AUTO-ENTMCNC: 34.9 G/DL (ref 31–37)
MCV RBC AUTO: 105.3 FL (ref 80–100)
MICROORGANISM SPEC CULT: NORMAL
MICROORGANISM/AGENT SPEC: NORMAL
NITRITE UR QL STRIP: NEGATIVE
PH UR STRIP: 6.5 [PH] (ref 5–8)
PLATELET # BLD AUTO: 169 K/UL (ref 150–450)
PMV BLD AUTO: 7.5 FL (ref 6–12)
POTASSIUM SERPL-SCNC: 3.7 MMOL/L (ref 3.7–5.3)
PROT UR STRIP-MCNC: ABNORMAL MG/DL
RBC # BLD AUTO: 3.29 M/UL (ref 4.5–5.9)
RBC #/AREA URNS HPF: ABNORMAL /HPF
SERVICE CMNT-IMP: NORMAL
SODIUM SERPL-SCNC: 140 MMOL/L (ref 136–145)
SP GR UR STRIP: 1.01 (ref 1–1.03)
SPECIMEN DESCRIPTION: NORMAL
UROBILINOGEN UR STRIP-ACNC: NORMAL EU/DL (ref 0–1)
WBC #/AREA URNS HPF: ABNORMAL /HPF
WBC OTHER # BLD: 5.1 K/UL (ref 3.5–11)
WEST NILE IGG, CSF: 0.02 IV
WEST NILE IGM ANTIBODY CSF: 0 IV

## 2025-02-01 PROCEDURE — 6370000000 HC RX 637 (ALT 250 FOR IP): Performed by: PSYCHIATRY & NEUROLOGY

## 2025-02-01 PROCEDURE — 2500000003 HC RX 250 WO HCPCS: Performed by: INTERNAL MEDICINE

## 2025-02-01 PROCEDURE — 6370000000 HC RX 637 (ALT 250 FOR IP): Performed by: INTERNAL MEDICINE

## 2025-02-01 PROCEDURE — 2500000003 HC RX 250 WO HCPCS

## 2025-02-01 PROCEDURE — 99255 IP/OBS CONSLTJ NEW/EST HI 80: CPT | Performed by: STUDENT IN AN ORGANIZED HEALTH CARE EDUCATION/TRAINING PROGRAM

## 2025-02-01 PROCEDURE — 6360000002 HC RX W HCPCS

## 2025-02-01 PROCEDURE — 2580000003 HC RX 258: Performed by: NURSE PRACTITIONER

## 2025-02-01 PROCEDURE — 83605 ASSAY OF LACTIC ACID: CPT

## 2025-02-01 PROCEDURE — 80048 BASIC METABOLIC PNL TOTAL CA: CPT

## 2025-02-01 PROCEDURE — G0545 PR INHERENT VISIT TO INPT: HCPCS | Performed by: INTERNAL MEDICINE

## 2025-02-01 PROCEDURE — 6360000002 HC RX W HCPCS: Performed by: INTERNAL MEDICINE

## 2025-02-01 PROCEDURE — APPNB60 APP NON BILLABLE TIME 46-60 MINS: Performed by: NURSE PRACTITIONER

## 2025-02-01 PROCEDURE — 6360000002 HC RX W HCPCS: Performed by: NURSE PRACTITIONER

## 2025-02-01 PROCEDURE — 81001 URINALYSIS AUTO W/SCOPE: CPT

## 2025-02-01 PROCEDURE — 6370000000 HC RX 637 (ALT 250 FOR IP)

## 2025-02-01 PROCEDURE — 36415 COLL VENOUS BLD VENIPUNCTURE: CPT

## 2025-02-01 PROCEDURE — 85027 COMPLETE CBC AUTOMATED: CPT

## 2025-02-01 PROCEDURE — 99232 SBSQ HOSP IP/OBS MODERATE 35: CPT | Performed by: PSYCHIATRY & NEUROLOGY

## 2025-02-01 PROCEDURE — 2060000000 HC ICU INTERMEDIATE R&B

## 2025-02-01 PROCEDURE — 99233 SBSQ HOSP IP/OBS HIGH 50: CPT | Performed by: INTERNAL MEDICINE

## 2025-02-01 PROCEDURE — 99233 SBSQ HOSP IP/OBS HIGH 50: CPT

## 2025-02-01 PROCEDURE — 2580000003 HC RX 258: Performed by: INTERNAL MEDICINE

## 2025-02-01 PROCEDURE — 74018 RADEX ABDOMEN 1 VIEW: CPT

## 2025-02-01 PROCEDURE — 82140 ASSAY OF AMMONIA: CPT

## 2025-02-01 RX ORDER — DOCUSATE SODIUM 100 MG/1
100 CAPSULE, LIQUID FILLED ORAL DAILY
Status: DISCONTINUED | OUTPATIENT
Start: 2025-02-01 | End: 2025-02-02

## 2025-02-01 RX ORDER — SIMETHICONE 80 MG
80 TABLET,CHEWABLE ORAL ONCE
Status: COMPLETED | OUTPATIENT
Start: 2025-02-01 | End: 2025-02-01

## 2025-02-01 RX ORDER — LORAZEPAM 2 MG/ML
1 INJECTION INTRAMUSCULAR EVERY 6 HOURS PRN
Status: DISCONTINUED | OUTPATIENT
Start: 2025-02-01 | End: 2025-02-02

## 2025-02-01 RX ADMIN — DOLUTEGRAVIR SODIUM 50 MG: 50 TABLET, FILM COATED ORAL at 09:35

## 2025-02-01 RX ADMIN — LAMIVUDINE 300 MG: 100 TABLET, FILM COATED ORAL at 09:34

## 2025-02-01 RX ADMIN — LORAZEPAM 1 MG: 2 INJECTION INTRAMUSCULAR; INTRAVENOUS at 05:05

## 2025-02-01 RX ADMIN — SODIUM CHLORIDE, PRESERVATIVE FREE 10 ML: 5 INJECTION INTRAVENOUS at 22:03

## 2025-02-01 RX ADMIN — SODIUM CHLORIDE, PRESERVATIVE FREE 40 MG: 5 INJECTION INTRAVENOUS at 09:32

## 2025-02-01 RX ADMIN — OLANZAPINE 15 MG: 15 TABLET, FILM COATED ORAL at 21:21

## 2025-02-01 RX ADMIN — SODIUM CHLORIDE, PRESERVATIVE FREE 10 ML: 5 INJECTION INTRAVENOUS at 09:32

## 2025-02-01 RX ADMIN — FOLIC ACID 1 MG: 1 TABLET ORAL at 09:32

## 2025-02-01 RX ADMIN — SIMETHICONE 80 MG: 80 TABLET, CHEWABLE ORAL at 22:43

## 2025-02-01 RX ADMIN — ABACAVIR SULFATE 600 MG: 300 TABLET, FILM COATED ORAL at 09:35

## 2025-02-01 RX ADMIN — THIAMINE HCL TAB 100 MG 100 MG: 100 TAB at 09:32

## 2025-02-01 RX ADMIN — ATORVASTATIN CALCIUM 20 MG: 20 TABLET, FILM COATED ORAL at 21:21

## 2025-02-01 RX ADMIN — SODIUM CHLORIDE, PRESERVATIVE FREE 40 MG: 5 INJECTION INTRAVENOUS at 22:43

## 2025-02-01 RX ADMIN — CEFTRIAXONE SODIUM 2000 MG: 2 INJECTION, POWDER, FOR SOLUTION INTRAMUSCULAR; INTRAVENOUS at 18:37

## 2025-02-01 RX ADMIN — SODIUM CHLORIDE, PRESERVATIVE FREE 10 ML: 5 INJECTION INTRAVENOUS at 21:51

## 2025-02-01 ASSESSMENT — PAIN DESCRIPTION - LOCATION
LOCATION: ABDOMEN
LOCATION: ABDOMEN

## 2025-02-01 ASSESSMENT — PAIN SCALES - GENERAL
PAINLEVEL_OUTOF10: 4
PAINLEVEL_OUTOF10: 8
PAINLEVEL_OUTOF10: 8

## 2025-02-01 ASSESSMENT — PAIN DESCRIPTION - ORIENTATION
ORIENTATION: MID
ORIENTATION: MID

## 2025-02-01 ASSESSMENT — PAIN DESCRIPTION - FREQUENCY: FREQUENCY: CONTINUOUS

## 2025-02-01 ASSESSMENT — PAIN - FUNCTIONAL ASSESSMENT: PAIN_FUNCTIONAL_ASSESSMENT: PREVENTS OR INTERFERES SOME ACTIVE ACTIVITIES AND ADLS

## 2025-02-01 ASSESSMENT — PAIN DESCRIPTION - PAIN TYPE: TYPE: ACUTE PAIN

## 2025-02-01 ASSESSMENT — PAIN DESCRIPTION - DESCRIPTORS
DESCRIPTORS: BURNING
DESCRIPTORS: ACHING;CRAMPING;DISCOMFORT

## 2025-02-01 NOTE — CARE COORDINATION
ONGOING DISCHARGE PLAN:    Ok to transfer out of ICU today     IV atb     Ciwa Protocol    BHI once medically cleared    Will continue to follow for additional discharge needs.    If patient is discharged prior to next notation, then this note serves as note for discharge by case management.    Electronically signed by Tsering Santiago on 2/1/2025 at 4:42 PM

## 2025-02-01 NOTE — CONSULTS
[facility] -   Infectious diseases evaluation  admission date 1/28/2025    reason for consultation:   HIV    Impression :   Current:  HIV infection  Systemic inflammatory response syndrome/high fever with acute encephalopathy, no obvious source of infection  Hypotension acquiring pressors  Hyponatremia  Acute renal failure  Alcohol intoxication  Metabolic acidosis  Suicidal ideation  Alcohol abuse    HENCE:   IV Zosyn, dose adjusted to renal function  Acyclovir  Continue antiretroviral with Triumeq   Follow spinal tap results, preliminary showed 16 WBCs, 2 RBCs, 39.6 protein, 103 glucose  Follow blood cultures  Stool for GI panel  CT chest abdomen pelvis without contrast  Follow CBC and renal function  Follow procalcitonin level    Infection Control Recommendations   Beeler Precautions      Antimicrobial Stewardship Recommendations   Simplification of therapy  Targeted therapy      History of Present Illness:   Initial history:  Gary Jones is a 39 y.o.-year-old male was admitted initially for suicidal ideation, developed mental status, was found to be hyponatremic with a sodium level of 127.  He was tachycardic, febrile with reported temperature max of 104, hypotensive.  He received fluid boluses and was started on pressors.  Reportedly the patient had diarrhea.  The patient is drowsy, arousable, answers some questions, not consistent.  PICC line was placed earlier today  Fluoxetine and olanzapine were held for suspected serotonin syndrome/neuroleptic malignant syndrome  Initial WBC 5.1, ethanol level was 337  Chest x-ray showed no infiltrate  1/29/2025 creatinine increased to 2, procalcitonin level more than 100  CK1, 487  Urinalysis 1/28/2025 negative leukocyte esterase, 0-2 WBC  Urine drug screen negative  Respiratory panel with COVID-19 negative  Blood culture sent  CD4 count 330, 12% helper T-cell on 1/28/2025  Stool for C. difficile - 1/29/2025  CT head 
    Gastroenterology Consult Note    Patient:   Gary Jones   Admit date:  1/28/2025  Facility:   [facility]  Referring/PCP: No primary care provider on file.  Date:     2/1/2025  Consultant:   ZIGGY Treadwell NP, Donovan Spann MD    Subjective:     This 39 y.o. male was admitted 1/28/2025 with a diagnosis of \"Suicidal ideation [R45.851]  Hyponatremia [E87.1]\" and is seen in consultation regarding   Chief Complaint   Patient presents with    Mental Health Problem      39-year-old male with past medical history of HIV, depression, suicidal ideation, alcohol use disorder initially presented to the ED 1/28/2025 with suicidal ideation.  Patient reported thoughts of cutting his throat.  Patient states he was drinking heavily the day of admission.  In the ED patient was hyponatremic, sodium 127.  EtOH 337.  Urine tox negative.  LFTs within normal limits.  Hemoglobin 14.4, .3.  Patient was tachycardic, febrile with reported max temperature of 104, hypotensive.  Patient received fluid bolus and started on pressor support.  Initial chest x-ray showed no infiltrate.  UA negative.  Respiratory panel and COVID negative.  Blood cultures negative.    Patient also had LP which was negative.  Patient did report diarrhea; stool was positive for Shigella.  ID is following.  Currently on Rocephin.  Patient had FMS at 1 point.  This was removed yesterday.  Following this patient did have somewhat of bright red blood per rectum for which we were consulted.  Hemoglobin relatively stable.  12.1.  .3.  Patient reports overall his diarrhea has significantly improved.  No reported bowel movements overnight or today.  Denies any rectal bleeding overnight.  Denies any nausea, vomiting.  Is tolerating diet.  Has some mild tenderness to the right of the umbilicus.  Vital signs are stable.    Patient denies any prior EGD, colonoscopy.  Denies NSAID use      Past Medical History:  Past Medical 
  [facility]   Augie Holcomb MD, FACS  Anju Morenory, APRN-CNP  [address], [address]  [place] [address]  P: [phone], F: [phone]    General and Robotic Surgery  Consult Note         PATIENT NAME: Gary Jones   :  1985   MRN: [id number]   PCP:  No primary care provider on file.   Referring Physician: Dr. Tadeo  Reason for Consult: Eugene dialysis catheter placement    TODAY'S DATE: 2025    HISTORY OF PRESENT ILLNESS: 39 y.o. male presents to the emergency department suicidal ideation per emergency department notes.  Per emergency department notes, patient had thoughts of cutting his throat.  He also admitted to drinking heavily that night.  Patient with significant medical history for HIV.  Was undergoing outpatient treatment.  Patient hyponatremic in the emergency department, sodium 127.  EtOH level was 337.  Patient with a possible serotonin syndrome.  Patient's creatinine was 0.8 on admission, this morning increased to 3.1.  Patient has been quite lethargic, has been receiving Ativan per CIWA protocol.  Patient is seen and examined earlier today in the intermediate ICU.  Appears to be sleeping, he is arousable, but does not respond to questions.  PICC line just placed.  Apparently after PICC line was placed, patient desaturated, now on 2 L of oxygen, nursing staff reported pleural fiction rub.  Chest x-ray pending.    PAST MEDICAL HISTORY     Past Medical History:   Diagnosis Date    Depression with suicidal ideation 2024    HIV disease ([facility])        PROBLEM LIST     Patient Active Problem List   Diagnosis    Mandibular fracture, closed, closed, initial encounter ([facility])    HIV (human immunodeficiency virus infection) ([facility])    Burn    Depression with suicidal ideation    Severe episode of recurrent major depressive disorder, with psychotic features ([facility])    Suicidal ideation    Acute pancreatitis    Alcohol use disorder    Hyponatremia 
  [facility]   CONSULT NOTE:      DATE OF CONSULT 1/29/2025    REASON FOR CONSULTATION:  Patient with acute kidney injury metabolic acidosis      PCP No primary care provider on file.     CHIEF COMPLAINT: (Acute kidney injury metabolic acidosis)    HISTORY OF PRESENT ILLNESS:     39-year-old male.  Patient has a history of psych issues and a history of HIV positive disease.  Patient follows with the HIV clinic at [facility].    He reportedly was feeling suicidal with a plan of drinking heavily.  He presented to the ER via law enforcement because of mental health evaluation.  Physical assessment was reported unremarkable but lab work was notable  For a sodium of 127 BUN/creatinine 3/0.8 blood alcohol level 0.337 urine drug screen negative acetaminophen and salicylate levels unremarkable.  Initial white count 5.1 no evidence of any eosinophilia no evidence of any bandemia.    His course was notable for problems where he started feeling worse and getting worse    Patient had a repeat lab work where his BUN/creatinine was 11/3.1.  Serum bicarbonate dropped to 14.  Anion gap went up to 25.  Chest x-ray to my eyes revealed no evidence of any pneumonias or consolidation.  Patient I would had problems of blood pressure.  A PICC line was placed in the right upper extremity and the patient is currently on Levophed at 15 mics.  Blood pressure did respond.  Patient was given volume resuscitation of as well as albumin with resulting in improvement in urine output.  Patient is now longer nonoliguric at this time.  He is able to give us a history but he appears sleepy        ALLERGIES:  No Known Allergies    HOME MEDICATIONS:  Medications Prior to Admission: abacavir-dolutegravir-lamiVUDine (TRIUMEQ) 600- MG TABS, Take 1 tablet by mouth daily  traZODone (DESYREL) 50 MG tablet, Take 1 tablet by mouth nightly as needed for Sleep  OLANZapine (ZYPREXA) 15 MG 
  [facility]   IN-PATIENT SERVICE      NEUROLOGY CONSULT  NOTE            Date:   1/29/2025  Patient name:  Gary Jones  Date of admission:  1/28/2025  YOB: 1985      Chief Complaint:     Chief Complaint   Patient presents with    Mental Health Problem       Reason for Consult:      Altered mental status, concern for serotonin syndrome/NMS    History of Present Illness:     39-year-old male with past medical history of HIV, alcoholism, depression, who was initially admitted for suicidal ideation.  Neurology consulted for altered mentation, concern for serotonin syndrome/neuroleptic malignant syndrome.  History obtained from patient, care team, and chart review.    Patient initially presented to the ED yesterday for suicidal ideation.  He was found to have hyponatremia.  Sodium was 127.  He was intoxicated with ethanol level 337.  Initially, he appeared to be alert and oriented.  There was concern for possible serotonin syndrome as he was tachycardic, hyperthermic.  He was given cyproheptadine along with intermittent benzodiazepines.  Fluoxetine and olanzapine were held.  There was also concerns for ongoing diarrhea.  Then, overnight, he started getting hypotensive with increasing heart rate. Found to have worsening renal function with elevated CK.  He was started on Levophed for shock.  He continued to be tremulous.  Was transferred to the ICU.    At time of evaluation, appears patient's mentation is improved.  He is relatively oriented and able to follow commands.  He denies headache or neck pain.  No recent sick contacts.  States he is compliant with his HIV medications.  He denies any overdose on his medications including fluoxetine or olanzapine.    Past Medical History:     Past Medical History:   Diagnosis Date    Depression with suicidal ideation 4/16/2024    HIV disease (HCC)         Past Surgical History:     Past Surgical History:   Procedure Laterality Date    MOUTH SURGERY 
Date:   1/28/2025  Patient name: Gary Jones  Date of admission:  1/28/2025  1:31 AM  MRN:   [id number]  YOB: 1985  PCP: No primary care provider on file.    Reason for Admission: Suicidal ideation [R45.851]  Hyponatremia [E87.1]    Cardiology consult: Tachycardia       Referring physician: Dr Delaney Coombs    Impression  Sinus tachycardia heart rate up to 160 or above patient on fluoxetine and olanzapine/serotonin syndrome,  patient is also having fever 103.1  Alcohol abuse, alcohol level on admission 337 mg/dl  Previous history of HIV  History of depression/anxiety/suicidal ideation  Severe hyponatremia/probable heavy amount of beer conjunction  Elevated lymphocyte count    History of present illness    39-year-old male with a past medical history of mental health problem got hospitalized for the management of hyponatremia, also has a fever 103.1 and sinus tachycardia, probable serotonin syndrome.  Patient was drowsy difficult to obtain good history.  He denied any chest pain or palpitation or shortness of breath.    ECG on admission sinus tachycardia heart rate 160 high voltage  Chest x-ray no acute process  Lab work on admission  Sodium 127 potassium 4.3 chloride 89 BUN 3 creatinine 0.8 glucose 100 serum osmolality 279, total CK2 97, albumin 4.3, bilirubin total 0.3  Serum alcohol 337 Mg per deciliter, percentage 0.337  WBC 5.1 hemoglobin 14.4 , platelets 250, lymphocyte 54 neutrophil 26      Medications:   Scheduled Meds:   abacavir-dolutegravir-lamiVUDine  1 tablet Oral Daily    atorvastatin  20 mg Oral Nightly    [Held by provider] FLUoxetine  20 mg Oral Daily    [Held by provider] OLANZapine  15 mg Oral Nightly    folic acid  1 mg Oral Daily    sodium chloride flush  5-40 mL IntraVENous 2 times per day    enoxaparin  40 mg SubCUTAneous Daily    sodium chloride flush  5-40 mL IntraVENous 2 times per day    thiamine  100 mg Oral Daily    piperacillin-tazobactam  3,375 mg IntraVENous Q8H    
History:   Family History   Problem Relation Age of Onset    High Blood Pressure Mother        Review of Systems:    Constitutional: No fever, no chills, no night sweats, fatigue, generalized weakness, loss of appetite  HEENT:  No headache, otalgia, itchy eyes, epistaxis, nasal discharge or sore throat.  Cardiac:  No chest pain, dyspnea, orthopnea or PND, palpitations  Chest:  No cough, hemoptysis, pleuritic chest pain, wheezing,SOB  Abdomen:  No abdominal pain, nausea, vomiting, +diarrhea, melena, dysphagia hematemesis,constipation, abdominal bloating, flank pain  Neuro:  No CVA, TIA or seizure like activity.  Skin:   No rashes, no itching.  :   No hematuria, no pyuria, no dysuria, no flank pain.  Extremities:  No swelling or joint pains.      Objective:  CURRENT TEMPERATURE:  Temp: 99.4 °F (37.4 °C)  MAXIMUM TEMPERATURE OVER 24HRS:  Temp (24hrs), Av.4 °F (38.6 °C), Min:99.4 °F (37.4 °C), Max:103.1 °F (39.5 °C)    CURRENT RESPIRATORY RATE:  Respirations: 27  CURRENT PULSE:  Pulse: (!) 105  CURRENT BLOOD PRESSURE:  BP: 130/74  24HR BLOOD PRESSURE RANGE:  Systolic (24hrs), Av , Min:65 , Max:133   ; Diastolic (24hrs), Av, Min:24, Max:106    24HR INTAKE/OUTPUT:    Intake/Output Summary (Last 24 hours) at 2025 1221  Last data filed at 2025 1147  Gross per 24 hour   Intake 3308.08 ml   Output 3350 ml   Net -41.92 ml     Patient Vitals for the past 96 hrs (Last 3 readings):   Weight   25 0830 65.8 kg (145 lb)       Physical Exam:  GENERAL APPEARANCE: Awake, responsive lethargic  HEAD: normocephalic  EYES:  EOMI. Not pale, anicteric   NOSE:  No nasal discharge.    THROAT:  Oral cavity and pharynx normal.  Dry mucosa  CARDIAC: Normal S1 and S2. No S3, S4 or murmurs. Rhythm is regular.  LUNGS: Clear to auscultation without rales, rhonchi, wheezing or diminished breath sounds.  NECK: Neck supple, non-tender, trachea midline  GI-soft nontender nondistended abdomen  MUSKULOSKELETAL: Adequately 
unit    Electronically signed by GEOFF STOVALL MD on 1/29/25 at 9:52 PM EST

## 2025-02-02 LAB
ANION GAP SERPL CALCULATED.3IONS-SCNC: 10 MMOL/L (ref 9–16)
BASOPHILS # BLD: 0 K/UL (ref 0–0.2)
BASOPHILS NFR BLD: 0 % (ref 0–2)
BUN SERPL-MCNC: 10 MG/DL (ref 6–20)
CALCIUM SERPL-MCNC: 9.1 MG/DL (ref 8.6–10.4)
CHLORIDE SERPL-SCNC: 108 MMOL/L (ref 98–107)
CO2 SERPL-SCNC: 20 MMOL/L (ref 20–31)
CREAT SERPL-MCNC: 0.9 MG/DL (ref 0.7–1.2)
CSF ISOELECTRIC FOCUSING INTERPRETATION: NORMAL
EOSINOPHIL # BLD: 0 K/UL (ref 0–0.4)
EOSINOPHILS RELATIVE PERCENT: 0 % (ref 0–4)
ERYTHROCYTE [DISTWIDTH] IN BLOOD BY AUTOMATED COUNT: 14.2 % (ref 11.5–14.9)
GFR, ESTIMATED: >90 ML/MIN/1.73M2
GLUCOSE SERPL-MCNC: 162 MG/DL (ref 74–99)
HCT VFR BLD AUTO: 35.7 % (ref 41–53)
HGB BLD-MCNC: 12.2 G/DL (ref 13.5–17.5)
LYMPHOCYTES NFR BLD: 2.32 K/UL (ref 1–4.8)
LYMPHOCYTES RELATIVE PERCENT: 40 % (ref 24–44)
MCH RBC QN AUTO: 35.2 PG (ref 26–34)
MCHC RBC AUTO-ENTMCNC: 34.2 G/DL (ref 31–37)
MCV RBC AUTO: 103 FL (ref 80–100)
MICROORGANISM SPEC CULT: NORMAL
MICROORGANISM SPEC CULT: NORMAL
MONOCYTES NFR BLD: 1.16 K/UL (ref 0.1–1.3)
MONOCYTES NFR BLD: 20 % (ref 1–7)
MORPHOLOGY: NORMAL
NEUTROPHILS NFR BLD: 40 % (ref 36–66)
NEUTS SEG NFR BLD: 2.32 K/UL (ref 1.3–9.1)
OLIGOCLONAL BANDS CSF IEF: 0 BANDS (ref 0–1)
OLIGOCLONAL BANDS: NEGATIVE
PLATELET # BLD AUTO: 218 K/UL (ref 150–450)
PMV BLD AUTO: 7.5 FL (ref 6–12)
POTASSIUM SERPL-SCNC: 3.6 MMOL/L (ref 3.7–5.3)
RBC # BLD AUTO: 3.47 M/UL (ref 4.5–5.9)
SERVICE CMNT-IMP: NORMAL
SERVICE CMNT-IMP: NORMAL
SODIUM SERPL-SCNC: 138 MMOL/L (ref 136–145)
SPECIMEN DESCRIPTION: NORMAL
SPECIMEN DESCRIPTION: NORMAL
WBC OTHER # BLD: 5.8 K/UL (ref 3.5–11)

## 2025-02-02 PROCEDURE — 6370000000 HC RX 637 (ALT 250 FOR IP)

## 2025-02-02 PROCEDURE — 6360000002 HC RX W HCPCS: Performed by: NURSE PRACTITIONER

## 2025-02-02 PROCEDURE — 6370000000 HC RX 637 (ALT 250 FOR IP): Performed by: INTERNAL MEDICINE

## 2025-02-02 PROCEDURE — 2500000003 HC RX 250 WO HCPCS: Performed by: INTERNAL MEDICINE

## 2025-02-02 PROCEDURE — 1200000000 HC SEMI PRIVATE

## 2025-02-02 PROCEDURE — 6360000002 HC RX W HCPCS

## 2025-02-02 PROCEDURE — APPSS30 APP SPLIT SHARED TIME 16-30 MINUTES: Performed by: NURSE PRACTITIONER

## 2025-02-02 PROCEDURE — 6370000000 HC RX 637 (ALT 250 FOR IP): Performed by: PSYCHIATRY & NEUROLOGY

## 2025-02-02 PROCEDURE — 6360000002 HC RX W HCPCS: Performed by: INTERNAL MEDICINE

## 2025-02-02 PROCEDURE — 2500000003 HC RX 250 WO HCPCS: Performed by: NURSE PRACTITIONER

## 2025-02-02 PROCEDURE — G0545 PR INHERENT VISIT TO INPT: HCPCS | Performed by: INTERNAL MEDICINE

## 2025-02-02 PROCEDURE — 99232 SBSQ HOSP IP/OBS MODERATE 35: CPT | Performed by: INTERNAL MEDICINE

## 2025-02-02 PROCEDURE — 93005 ELECTROCARDIOGRAM TRACING: CPT

## 2025-02-02 PROCEDURE — 85025 COMPLETE CBC W/AUTO DIFF WBC: CPT

## 2025-02-02 PROCEDURE — 36415 COLL VENOUS BLD VENIPUNCTURE: CPT

## 2025-02-02 PROCEDURE — 99233 SBSQ HOSP IP/OBS HIGH 50: CPT

## 2025-02-02 PROCEDURE — 2580000003 HC RX 258: Performed by: NURSE PRACTITIONER

## 2025-02-02 PROCEDURE — 80048 BASIC METABOLIC PNL TOTAL CA: CPT

## 2025-02-02 PROCEDURE — 99232 SBSQ HOSP IP/OBS MODERATE 35: CPT | Performed by: STUDENT IN AN ORGANIZED HEALTH CARE EDUCATION/TRAINING PROGRAM

## 2025-02-02 PROCEDURE — 2580000003 HC RX 258: Performed by: INTERNAL MEDICINE

## 2025-02-02 RX ORDER — POTASSIUM CHLORIDE 1500 MG/1
20 TABLET, EXTENDED RELEASE ORAL ONCE
Status: COMPLETED | OUTPATIENT
Start: 2025-02-02 | End: 2025-02-02

## 2025-02-02 RX ORDER — LOSARTAN POTASSIUM 25 MG/1
25 TABLET ORAL DAILY
Status: DISCONTINUED | OUTPATIENT
Start: 2025-02-02 | End: 2025-02-03 | Stop reason: HOSPADM

## 2025-02-02 RX ORDER — BENZOCAINE/MENTHOL 6 MG-10 MG
LOZENGE MUCOUS MEMBRANE 2 TIMES DAILY
Status: DISCONTINUED | OUTPATIENT
Start: 2025-02-02 | End: 2025-02-03 | Stop reason: HOSPADM

## 2025-02-02 RX ORDER — LORAZEPAM 0.5 MG/1
0.5 TABLET ORAL EVERY 4 HOURS PRN
Status: DISCONTINUED | OUTPATIENT
Start: 2025-02-02 | End: 2025-02-03 | Stop reason: HOSPADM

## 2025-02-02 RX ORDER — METOPROLOL SUCCINATE 25 MG/1
25 TABLET, EXTENDED RELEASE ORAL DAILY
Status: DISCONTINUED | OUTPATIENT
Start: 2025-02-02 | End: 2025-02-03 | Stop reason: HOSPADM

## 2025-02-02 RX ADMIN — SODIUM CHLORIDE, PRESERVATIVE FREE 10 ML: 5 INJECTION INTRAVENOUS at 23:58

## 2025-02-02 RX ADMIN — THIAMINE HCL TAB 100 MG 100 MG: 100 TAB at 08:57

## 2025-02-02 RX ADMIN — ACETAMINOPHEN 650 MG: 325 TABLET ORAL at 16:01

## 2025-02-02 RX ADMIN — SODIUM CHLORIDE, PRESERVATIVE FREE 40 MG: 5 INJECTION INTRAVENOUS at 11:35

## 2025-02-02 RX ADMIN — SODIUM CHLORIDE, PRESERVATIVE FREE 40 MG: 5 INJECTION INTRAVENOUS at 23:55

## 2025-02-02 RX ADMIN — ATORVASTATIN CALCIUM 20 MG: 20 TABLET, FILM COATED ORAL at 23:54

## 2025-02-02 RX ADMIN — ABACAVIR SULFATE 600 MG: 300 TABLET, FILM COATED ORAL at 08:57

## 2025-02-02 RX ADMIN — FOLIC ACID 1 MG: 1 TABLET ORAL at 08:57

## 2025-02-02 RX ADMIN — DOLUTEGRAVIR SODIUM 50 MG: 50 TABLET, FILM COATED ORAL at 08:57

## 2025-02-02 RX ADMIN — LORAZEPAM 1 MG: 2 INJECTION INTRAMUSCULAR; INTRAVENOUS at 06:33

## 2025-02-02 RX ADMIN — HYDROCORTISONE: 1 CREAM TOPICAL at 23:54

## 2025-02-02 RX ADMIN — SODIUM CHLORIDE, PRESERVATIVE FREE 10 ML: 5 INJECTION INTRAVENOUS at 08:58

## 2025-02-02 RX ADMIN — CEFTRIAXONE SODIUM 2000 MG: 2 INJECTION, POWDER, FOR SOLUTION INTRAMUSCULAR; INTRAVENOUS at 18:03

## 2025-02-02 RX ADMIN — LORAZEPAM 1 MG: 2 INJECTION INTRAMUSCULAR; INTRAVENOUS at 00:29

## 2025-02-02 RX ADMIN — HYDROCORTISONE: 1 CREAM TOPICAL at 06:32

## 2025-02-02 RX ADMIN — LORAZEPAM 0.5 MG: 0.5 TABLET ORAL at 13:58

## 2025-02-02 RX ADMIN — OLANZAPINE 15 MG: 15 TABLET, FILM COATED ORAL at 23:53

## 2025-02-02 RX ADMIN — METOPROLOL SUCCINATE 25 MG: 25 TABLET, EXTENDED RELEASE ORAL at 13:59

## 2025-02-02 RX ADMIN — LOSARTAN POTASSIUM 25 MG: 25 TABLET, FILM COATED ORAL at 13:58

## 2025-02-02 RX ADMIN — POTASSIUM CHLORIDE 20 MEQ: 1500 TABLET, EXTENDED RELEASE ORAL at 13:59

## 2025-02-02 RX ADMIN — LORAZEPAM 0.5 MG: 0.5 TABLET ORAL at 17:53

## 2025-02-02 RX ADMIN — LAMIVUDINE 300 MG: 100 TABLET, FILM COATED ORAL at 08:57

## 2025-02-02 NOTE — CARE COORDINATION
ONGOING DISCHARGE PLAN:    Patient is alert and oriented x4.    Spoke with patient regarding discharge plan and patient confirms that plan is still home.    Psych consult  May need  BHI     GI consult  +shigella  IV rocephin    Cardio consult  EF 35-40%      Will continue to follow for additional discharge needs.    If patient is discharged prior to next notation, then this note serves as note for discharge by case management.    Electronically signed by Anne Mishra RN on 2/2/2025 at 3:12 PM

## 2025-02-03 VITALS
HEIGHT: 65 IN | OXYGEN SATURATION: 100 % | HEART RATE: 72 BPM | DIASTOLIC BLOOD PRESSURE: 94 MMHG | RESPIRATION RATE: 16 BRPM | SYSTOLIC BLOOD PRESSURE: 130 MMHG | BODY MASS INDEX: 26.12 KG/M2 | WEIGHT: 156.75 LBS | TEMPERATURE: 98.2 F

## 2025-02-03 LAB
ANION GAP SERPL CALCULATED.3IONS-SCNC: 11 MMOL/L (ref 9–16)
BASOPHILS # BLD: 0 K/UL (ref 0–0.2)
BASOPHILS NFR BLD: 0 % (ref 0–2)
BUN SERPL-MCNC: 10 MG/DL (ref 6–20)
CALCIUM SERPL-MCNC: 9.1 MG/DL (ref 8.6–10.4)
CHLORIDE SERPL-SCNC: 107 MMOL/L (ref 98–107)
CO2 SERPL-SCNC: 20 MMOL/L (ref 20–31)
CREAT SERPL-MCNC: 0.9 MG/DL (ref 0.7–1.2)
EOSINOPHIL # BLD: 0.05 K/UL (ref 0–0.4)
EOSINOPHILS RELATIVE PERCENT: 1 % (ref 0–4)
ERYTHROCYTE [DISTWIDTH] IN BLOOD BY AUTOMATED COUNT: 13.8 % (ref 11.5–14.9)
GFR, ESTIMATED: >90 ML/MIN/1.73M2
GLUCOSE SERPL-MCNC: 189 MG/DL (ref 74–99)
HCT VFR BLD AUTO: 35.9 % (ref 41–53)
HGB BLD-MCNC: 12.5 G/DL (ref 13.5–17.5)
LYMPHOCYTES NFR BLD: 2.43 K/UL (ref 1–4.8)
LYMPHOCYTES RELATIVE PERCENT: 45 % (ref 24–44)
MCH RBC QN AUTO: 36.2 PG (ref 26–34)
MCHC RBC AUTO-ENTMCNC: 34.9 G/DL (ref 31–37)
MCV RBC AUTO: 103.9 FL (ref 80–100)
MONOCYTES NFR BLD: 0.92 K/UL (ref 0.1–1.3)
MONOCYTES NFR BLD: 17 % (ref 1–7)
MORPHOLOGY: ABNORMAL
NEUTROPHILS NFR BLD: 37 % (ref 36–66)
NEUTS SEG NFR BLD: 2 K/UL (ref 1.3–9.1)
PLATELET # BLD AUTO: 252 K/UL (ref 150–450)
PMV BLD AUTO: 7.6 FL (ref 6–12)
POTASSIUM SERPL-SCNC: 3.8 MMOL/L (ref 3.7–5.3)
RBC # BLD AUTO: 3.46 M/UL (ref 4.5–5.9)
SODIUM SERPL-SCNC: 138 MMOL/L (ref 136–145)
WBC OTHER # BLD: 5.4 K/UL (ref 3.5–11)

## 2025-02-03 PROCEDURE — 2580000003 HC RX 258: Performed by: NURSE PRACTITIONER

## 2025-02-03 PROCEDURE — 6370000000 HC RX 637 (ALT 250 FOR IP): Performed by: INTERNAL MEDICINE

## 2025-02-03 PROCEDURE — 85025 COMPLETE CBC W/AUTO DIFF WBC: CPT

## 2025-02-03 PROCEDURE — 99232 SBSQ HOSP IP/OBS MODERATE 35: CPT | Performed by: INTERNAL MEDICINE

## 2025-02-03 PROCEDURE — 6370000000 HC RX 637 (ALT 250 FOR IP)

## 2025-02-03 PROCEDURE — 80048 BASIC METABOLIC PNL TOTAL CA: CPT

## 2025-02-03 PROCEDURE — 6360000002 HC RX W HCPCS: Performed by: INTERNAL MEDICINE

## 2025-02-03 PROCEDURE — G0545 PR INHERENT VISIT TO INPT: HCPCS | Performed by: INTERNAL MEDICINE

## 2025-02-03 PROCEDURE — 2500000003 HC RX 250 WO HCPCS: Performed by: INTERNAL MEDICINE

## 2025-02-03 PROCEDURE — 6360000002 HC RX W HCPCS

## 2025-02-03 PROCEDURE — 2500000003 HC RX 250 WO HCPCS

## 2025-02-03 PROCEDURE — 99232 SBSQ HOSP IP/OBS MODERATE 35: CPT | Performed by: PSYCHIATRY & NEUROLOGY

## 2025-02-03 PROCEDURE — 6360000002 HC RX W HCPCS: Performed by: NURSE PRACTITIONER

## 2025-02-03 PROCEDURE — 2580000003 HC RX 258: Performed by: INTERNAL MEDICINE

## 2025-02-03 PROCEDURE — 36415 COLL VENOUS BLD VENIPUNCTURE: CPT

## 2025-02-03 RX ORDER — METOPROLOL SUCCINATE 25 MG/1
25 TABLET, EXTENDED RELEASE ORAL DAILY
OUTPATIENT
Start: 2025-02-04

## 2025-02-03 RX ORDER — FOLIC ACID 1 MG/1
1 TABLET ORAL DAILY
OUTPATIENT
Start: 2025-02-04

## 2025-02-03 RX ORDER — LOSARTAN POTASSIUM 25 MG/1
25 TABLET ORAL DAILY
Qty: 30 TABLET | Refills: 3 | Status: SHIPPED | OUTPATIENT
Start: 2025-02-04

## 2025-02-03 RX ORDER — ABACAVIR 300 MG/1
600 TABLET ORAL DAILY
OUTPATIENT
Start: 2025-02-04

## 2025-02-03 RX ORDER — POLYETHYLENE GLYCOL 3350 17 G/17G
17 POWDER, FOR SOLUTION ORAL DAILY PRN
OUTPATIENT
Start: 2025-02-03

## 2025-02-03 RX ORDER — LORAZEPAM 0.5 MG/1
0.5 TABLET ORAL EVERY 4 HOURS PRN
OUTPATIENT
Start: 2025-02-03

## 2025-02-03 RX ORDER — METOPROLOL SUCCINATE 25 MG/1
25 TABLET, EXTENDED RELEASE ORAL DAILY
Qty: 30 TABLET | Refills: 3 | Status: SHIPPED | OUTPATIENT
Start: 2025-02-04

## 2025-02-03 RX ORDER — LAMIVUDINE 100 MG/1
300 TABLET, FILM COATED ORAL DAILY
OUTPATIENT
Start: 2025-02-04

## 2025-02-03 RX ORDER — LOSARTAN POTASSIUM 25 MG/1
25 TABLET ORAL DAILY
OUTPATIENT
Start: 2025-02-04

## 2025-02-03 RX ORDER — OLANZAPINE 10 MG/1
15 TABLET ORAL NIGHTLY
OUTPATIENT
Start: 2025-02-03

## 2025-02-03 RX ORDER — ATORVASTATIN CALCIUM 20 MG/1
20 TABLET, FILM COATED ORAL NIGHTLY
OUTPATIENT
Start: 2025-02-03

## 2025-02-03 RX ORDER — BENZOCAINE/MENTHOL 6 MG-10 MG
LOZENGE MUCOUS MEMBRANE 2 TIMES DAILY
OUTPATIENT
Start: 2025-02-03

## 2025-02-03 RX ORDER — GAUZE BANDAGE 2" X 2"
100 BANDAGE TOPICAL DAILY
OUTPATIENT
Start: 2025-02-04

## 2025-02-03 RX ORDER — BISACODYL 10 MG
10 SUPPOSITORY, RECTAL RECTAL DAILY PRN
OUTPATIENT
Start: 2025-02-03

## 2025-02-03 RX ADMIN — SODIUM CHLORIDE, PRESERVATIVE FREE 10 ML: 5 INJECTION INTRAVENOUS at 07:47

## 2025-02-03 RX ADMIN — Medication 3 MG: at 01:36

## 2025-02-03 RX ADMIN — HYDROCORTISONE: 1 CREAM TOPICAL at 07:43

## 2025-02-03 RX ADMIN — LORAZEPAM 0.5 MG: 0.5 TABLET ORAL at 01:36

## 2025-02-03 RX ADMIN — SODIUM CHLORIDE, PRESERVATIVE FREE 40 MG: 5 INJECTION INTRAVENOUS at 11:41

## 2025-02-03 RX ADMIN — FOLIC ACID 1 MG: 1 TABLET ORAL at 07:42

## 2025-02-03 RX ADMIN — METOPROLOL SUCCINATE 25 MG: 25 TABLET, EXTENDED RELEASE ORAL at 07:42

## 2025-02-03 RX ADMIN — ABACAVIR SULFATE 600 MG: 300 TABLET, FILM COATED ORAL at 07:42

## 2025-02-03 RX ADMIN — DOLUTEGRAVIR SODIUM 50 MG: 50 TABLET, FILM COATED ORAL at 07:41

## 2025-02-03 RX ADMIN — ENOXAPARIN SODIUM 40 MG: 100 INJECTION SUBCUTANEOUS at 07:43

## 2025-02-03 RX ADMIN — SODIUM CHLORIDE, PRESERVATIVE FREE 10 ML: 5 INJECTION INTRAVENOUS at 07:54

## 2025-02-03 RX ADMIN — LORAZEPAM 0.5 MG: 0.5 TABLET ORAL at 07:43

## 2025-02-03 RX ADMIN — THIAMINE HCL TAB 100 MG 100 MG: 100 TAB at 07:42

## 2025-02-03 RX ADMIN — ACETAMINOPHEN 650 MG: 325 TABLET ORAL at 10:50

## 2025-02-03 RX ADMIN — LOSARTAN POTASSIUM 25 MG: 25 TABLET, FILM COATED ORAL at 07:42

## 2025-02-03 RX ADMIN — LORAZEPAM 0.5 MG: 0.5 TABLET ORAL at 13:07

## 2025-02-03 RX ADMIN — LAMIVUDINE 300 MG: 100 TABLET, FILM COATED ORAL at 07:41

## 2025-02-03 RX ADMIN — CEFTRIAXONE SODIUM 2000 MG: 2 INJECTION, POWDER, FOR SOLUTION INTRAMUSCULAR; INTRAVENOUS at 11:02

## 2025-02-03 ASSESSMENT — PAIN SCALES - GENERAL: PAINLEVEL_OUTOF10: 7

## 2025-02-03 ASSESSMENT — PAIN DESCRIPTION - LOCATION: LOCATION: ABDOMEN

## 2025-02-03 ASSESSMENT — PAIN DESCRIPTION - DESCRIPTORS: DESCRIPTORS: SHARP

## 2025-02-03 NOTE — PROGRESS NOTES
[facility] -   Infectious diseases evaluation  admission date 1/28/2025    reason for consultation:   HIV    Impression :   Current:  HIV infection  Shigella enteritis  Hypotension requiring pressor likely secondary to hydration  Hyponatremia  Encephalopathy likely metabolic  Acute renal failure resolved  Alcohol intoxication  Metabolic acidosis  Suicidal ideation  Alcohol abuse    HENCE:   Discontinue Zosyn  IV ceftriaxone   Avoid quinolones due to QTc prolongation.  QTc 510 on 1/28/2025  Continue antiretroviral with Triumeq   Follow blood cultures  CT chest abdomen pelvis without contrast suggestive of colitis, perinephric stranding  MRI of the brain pending  Echocardiogram pending  Follow CBC and renal function  Follow procalcitonin level  Discussed with Dr. Coombs    Infection Control Recommendations   [place] Precautions      Antimicrobial Stewardship Recommendations   Simplification of therapy  Targeted therapy      History of Present Illness:   Initial history:  Gary Jones is a 39 y.o.-year-old male was admitted initially for suicidal ideation, developed mental status, was found to be hyponatremic with a sodium level of 127.  He was tachycardic, febrile with reported temperature max of 104, hypotensive.  He received fluid boluses and was started on pressors.  Reportedly the patient had diarrhea.  The patient is drowsy, arousable, answers some questions, not consistent.  PICC line was placed earlier today  Fluoxetine and olanzapine were held for suspected serotonin syndrome/neuroleptic malignant syndrome  Initial WBC 5.1, ethanol level was 337  Chest x-ray showed no infiltrate  1/29/2025 creatinine increased to 2, procalcitonin level more than 100  CK1, 487  Urinalysis 1/28/2025 negative leukocyte esterase, 0-2 WBC  Urine drug screen negative  Respiratory panel with COVID-19 negative  Blood culture sent  CD4 count 330, 12% helper T-cell on 1/28/2025  Stool 
          [facility] -   Infectious diseases evaluation  admission date 1/28/2025    reason for consultation:   HIV    Impression :   Current:  HIV infection  Shigella enteritis  Hypotension requiring pressor likely secondary to hydration  Hyponatremia  Encephalopathy likely metabolic  Acute renal failure resolved  Alcohol intoxication  Metabolic acidosis  Suicidal ideation  Alcohol abuse    HENCE:     IV ceftriaxone course will be completed today  Avoid quinolones due to QTc prolongation.  QTc 510 on 1/28/2025  Continue antiretroviral with Triumeq   Follow blood cultures, no growth  CT chest abdomen pelvis without contrast suggestive of colitis, perinephric stranding  MRI of the brain noted  Echocardiogram noted  Follow CBC and renal function  Follow procalcitonin level      Infection Control Recommendations   [place] Precautions      Antimicrobial Stewardship Recommendations   Simplification of therapy  Targeted therapy      History of Present Illness:   Initial history:  Gary Jones is a 39 y.o.-year-old male was admitted initially for suicidal ideation, developed mental status, was found to be hyponatremic with a sodium level of 127.  He was tachycardic, febrile with reported temperature max of 104, hypotensive.  He received fluid boluses and was started on pressors.  Reportedly the patient had diarrhea.  The patient is drowsy, arousable, answers some questions, not consistent.  PICC line was placed earlier today  Fluoxetine and olanzapine were held for suspected serotonin syndrome/neuroleptic malignant syndrome  Initial WBC 5.1, ethanol level was 337  Chest x-ray showed no infiltrate  1/29/2025 creatinine increased to 2, procalcitonin level more than 100  CK1, 487  Urinalysis 1/28/2025 negative leukocyte esterase, 0-2 WBC  Urine drug screen negative  Respiratory panel with COVID-19 negative  Blood culture sent  CD4 count 330, 12% helper T-cell on 1/28/2025  Stool for C. 
          [facility] -   Infectious diseases evaluation  admission date 1/28/2025    reason for consultation:   HIV    Impression :   Current:  HIV infection  Shigella enteritis  Hypotension requiring pressor likely secondary to hydration  Hyponatremia  Encephalopathy likely metabolic  Acute renal failure resolved  Alcohol intoxication  Metabolic acidosis  Suicidal ideation  Alcohol abuse    HENCE:     IV ceftriaxone   Avoid quinolones due to QTc prolongation.  QTc 510 on 1/28/2025  Continue antiretroviral with Triumeq   Follow blood cultures, no growth  CT chest abdomen pelvis without contrast suggestive of colitis, perinephric stranding  MRI of the brain pending  Echocardiogram noted  Follow CBC and renal function  Follow procalcitonin level      Infection Control Recommendations   [place] Precautions      Antimicrobial Stewardship Recommendations   Simplification of therapy  Targeted therapy      History of Present Illness:   Initial history:  Gary Jones is a 39 y.o.-year-old male was admitted initially for suicidal ideation, developed mental status, was found to be hyponatremic with a sodium level of 127.  He was tachycardic, febrile with reported temperature max of 104, hypotensive.  He received fluid boluses and was started on pressors.  Reportedly the patient had diarrhea.  The patient is drowsy, arousable, answers some questions, not consistent.  PICC line was placed earlier today  Fluoxetine and olanzapine were held for suspected serotonin syndrome/neuroleptic malignant syndrome  Initial WBC 5.1, ethanol level was 337  Chest x-ray showed no infiltrate  1/29/2025 creatinine increased to 2, procalcitonin level more than 100  CK1, 487  Urinalysis 1/28/2025 negative leukocyte esterase, 0-2 WBC  Urine drug screen negative  Respiratory panel with COVID-19 negative  Blood culture sent  CD4 count 330, 12% helper T-cell on 1/28/2025  Stool for C. difficile - 1/29/2025  CT 
          [facility] -   Infectious diseases evaluation  admission date 1/28/2025    reason for consultation:   HIV    Impression :   Current:  HIV infection  Shigella enteritis  Hypotension requiring pressor likely secondary to hydration  Hyponatremia  Encephalopathy likely metabolic  Acute renal failure resolved  Alcohol intoxication  Metabolic acidosis  Suicidal ideation  Alcohol abuse    HENCE:     IV ceftriaxone through 2/3/2025  Avoid quinolones due to QTc prolongation.  QTc 510 on 1/28/2025  Continue antiretroviral with Triumeq   Follow blood cultures, no growth  CT chest abdomen pelvis without contrast suggestive of colitis, perinephric stranding  MRI of the brain noted  Echocardiogram noted  Follow CBC and renal function  Follow procalcitonin level      Infection Control Recommendations   [place] Precautions      Antimicrobial Stewardship Recommendations   Simplification of therapy  Targeted therapy      History of Present Illness:   Initial history:  Gary Jones is a 39 y.o.-year-old male was admitted initially for suicidal ideation, developed mental status, was found to be hyponatremic with a sodium level of 127.  He was tachycardic, febrile with reported temperature max of 104, hypotensive.  He received fluid boluses and was started on pressors.  Reportedly the patient had diarrhea.  The patient is drowsy, arousable, answers some questions, not consistent.  PICC line was placed earlier today  Fluoxetine and olanzapine were held for suspected serotonin syndrome/neuroleptic malignant syndrome  Initial WBC 5.1, ethanol level was 337  Chest x-ray showed no infiltrate  1/29/2025 creatinine increased to 2, procalcitonin level more than 100  CK1, 487  Urinalysis 1/28/2025 negative leukocyte esterase, 0-2 WBC  Urine drug screen negative  Respiratory panel with COVID-19 negative  Blood culture sent  CD4 count 330, 12% helper T-cell on 1/28/2025  Stool for C. difficile - 
          [facility] -   Infectious diseases evaluation  admission date 1/28/2025    reason for consultation:   HIV    Impression :   Current:  HIV infection  Shigella enteritis  Hypotension requiring pressor likely secondary to hydration  Hyponatremia  Encephalopathy likely metabolic  Acute renal failure resolved  Alcohol intoxication  Metabolic acidosis  Suicidal ideation  Alcohol abuse    HENCE:     IV ceftriaxone through 2/3/2025  Avoid quinolones due to QTc prolongation.  QTc 510 on 1/28/2025  Continue antiretroviral with Triumeq   Follow blood cultures, no growth  CT chest abdomen pelvis without contrast suggestive of colitis, perinephric stranding  MRI of the brain noted  Echocardiogram noted  Follow CBC and renal function  Follow procalcitonin level      Infection Control Recommendations   [place] Precautions      Antimicrobial Stewardship Recommendations   Simplification of therapy  Targeted therapy      History of Present Illness:   Initial history:  Gary Jones is a 39 y.o.-year-old male was admitted initially for suicidal ideation, developed mental status, was found to be hyponatremic with a sodium level of 127.  He was tachycardic, febrile with reported temperature max of 104, hypotensive.  He received fluid boluses and was started on pressors.  Reportedly the patient had diarrhea.  The patient is drowsy, arousable, answers some questions, not consistent.  PICC line was placed earlier today  Fluoxetine and olanzapine were held for suspected serotonin syndrome/neuroleptic malignant syndrome  Initial WBC 5.1, ethanol level was 337  Chest x-ray showed no infiltrate  1/29/2025 creatinine increased to 2, procalcitonin level more than 100  CK1, 487  Urinalysis 1/28/2025 negative leukocyte esterase, 0-2 WBC  Urine drug screen negative  Respiratory panel with COVID-19 negative  Blood culture sent  CD4 count 330, 12% helper T-cell on 1/28/2025  Stool for C. difficile - 
    [facility] PULMONARY,CRITICAL CARE & SLEEP   Syedpepito Ramirez MD/Aries TRINH AGAKIRSTENP-BC, NP-C      Wendi TRINH NP-C    Naseem TRINH NP-C                                         Pulmonary Progress Note    Patient - Gary Jones   Age - 39 y.o.   - 1985  MRN - [id number]  [id number]  Date of Admission - 2025  1:31 AM    Consulting Service/Physician:       Primary Care Physician: No primary care provider on file.    SUBJECTIVE:     Chief Complaint:   Chief Complaint   Patient presents with    Mental Health Problem     Subjective:    He had a wild night from what sounds like, ran into the bathroom with his rectal tube and Diaz still outside of the room when he shut the door.  He did receive Ativan earlier this morning.  He has been on Precedex which was turned off around 1230.  He has been fairly sedate since then.  He did wake up and was able to eat lunch, then drifts right back off to sleep.  Blood pressures have been on the low side, but most recent with a systolic blood pressure of 105.  He was hypothermic earlier today, currently normothermic with heating blanket.    VITALS  BP (!) 86/64   Pulse 81   Temp 97.4 °F (36.3 °C) (Axillary) Comment: bear5 hugger off  Resp 23   Ht 1.651 m (5' 5\")   Wt 65.8 kg (145 lb)   SpO2 98%   BMI 24.13 kg/m²   Wt Readings from Last 3 Encounters:   25 65.8 kg (145 lb)   10/22/23 65.8 kg (145 lb)   05/15/18 54.6 kg (120 lb 6.4 oz)     I/O (24 Hours)    Intake/Output Summary (Last 24 hours) at 2025 1438  Last data filed at 2025 1300  Gross per 24 hour   Intake 3267.72 ml   Output 1780 ml   Net 1487.72 ml     Ventilator:      Exam:   Physical Exam   Constitutional: Sleeping soundly, I am told that he awakens very briefly but goes back up to sleep very easily  HENT: Unremarkable  Head: Normocephalic and atraumatic.   Eyes: Eyes closed  Neck: Neck supple.   Cardiovascular:  Regular rate and 
    [facility] PULMONARY,CRITICAL CARE & SLEEP   Syedpepito Ramirez MD/Aries TRINH AGACNP-BC, NP-C      Wendi TRINH NP-C    Naseem TRINH NP-C                                         Pulmonary Progress Note    Patient - Gary Jones   Age - 39 y.o.   - 1985  MRN - [id number]  Acct # - [id number]  Date of Admission - 2025  1:31 AM    Consulting Service/Physician:       Primary Care Physician: No primary care provider on file.    SUBJECTIVE:     Chief Complaint:   Chief Complaint   Patient presents with    Mental Health Problem     Subjective:    He seems he doing much better, he is watching movies with a sitter.  He has not required any oxygen.  Tells me is occasional cough.  Vital signs been stable.  Temperature has been normothermic.    VITALS  BP (!) 130/92   Pulse 92   Temp 98 °F (36.7 °C) (Oral)   Resp 17   Ht 1.651 m (5' 5\")   Wt 71.1 kg (156 lb 12 oz)   SpO2 96%   BMI 26.08 kg/m²   Wt Readings from Last 3 Encounters:   25 71.1 kg (156 lb 12 oz)   10/22/23 65.8 kg (145 lb)   05/15/18 54.6 kg (120 lb 6.4 oz)     I/O (24 Hours)    Intake/Output Summary (Last 24 hours) at 2025 1456  Last data filed at 2025 1247  Gross per 24 hour   Intake 2115.26 ml   Output 3650 ml   Net -1534.74 ml     Ventilator:      Exam:   Physical Exam   Constitutional: Alert and oriented, sitting up watching TV, not in any distress  HENT: Unremarkable  Head: Normocephalic and atraumatic.   Eyes: PERRLA  Neck: Neck supple.   Cardiovascular:  Regular rate and rhythm.  Normal heart tones.  No JVD.    Pulmonary/Chest: Lung sounds are clear throughout, no adventitious sounds present, respirations easy nonlabored at rest on room air, pulse ox 96%  Abdominal: Abdomen soft  Musculoskeletal: Moves all extremity  Neurological:patient is alert and appropriate  Skin: Skin is warm and dry.   Extremities: No edema   Infusions:      dexmedeTOMIDine HCl in NaCl Stopped 
    [facility]   IN-PATIENT SERVICE   [facility]    Progress note            Date:   2/2/2025  Patient name:  Gary Jones  Date of admission:  1/28/2025  1:31 AM  MRN:   [id number]  Account:  [id number]  YOB: 1985  PCP:    No primary care provider on file.  Room:   [address] [place] [address]  Code Status:    Full Code    Chief Complaint:     Chief Complaint   Patient presents with    Mental Health Problem       History Obtained From:     patient    History of Present Illness:     The patient is a 39 y.o.  Non- / non  male who presents with Mental Health Problem   and he is admitted to the hospital for the management of      Gary Jones is a 39 y.o. Non- / non  male who presents with Mental Health Problem   and is admitted to the hospital for the management of Hyponatremia.     According to patient, he was feeling suicidal with a definite plan after drinking heavily the prior night.  He presented to the ER via law enforcement for mental health evaluation.  His physical assessment was grossly unremarkable.  He endorses difficulty with taking his prescribed medications.     Upon presentation to the ER, the patient was mentally altered his sodium was 127 down from a normal of 139.  His ethanol level was 337.  Urine tox screen was negative.  By the time of exam, the patient was alert and oriented.     Plan to admit patient for hyponatremia.  Psych evaluation.  Suicidal precautions.  Alcohol withdrawa    Past Medical History:     Past Medical History:   Diagnosis Date    Depression with suicidal ideation 4/16/2024    HIV disease (HCC)         Past Surgical History:     Past Surgical History:   Procedure Laterality Date    MOUTH SURGERY Bilateral     OTHER SURGICAL HISTORY  04/27/2017    extubatio in OR    [place] OFFICE/OUTPT VISIT,PROCEDURE ONLY N/A 4/27/2018    EXTUBATION OF ARTIFICIAL AIRWAY performed by Kiana Camarena MD at [facility] OR    [place] OPEN TREATMENT PALATAL/MAXILLARY 
    [facility]   IN-PATIENT SERVICE   [facility]    Progress note            Date:   1/31/2025  Patient name:  Gary Jones  Date of admission:  1/28/2025  1:31 AM  MRN:   [id number]  Account:  [id number]  YOB: 1985  PCP:    No primary care provider on file.  Room:   2012/2012-01  Code Status:    Full Code    Chief Complaint:     Chief Complaint   Patient presents with    Mental Health Problem       History Obtained From:     patient    History of Present Illness:     The patient is a 39 y.o.  Non- / non  male who presents with Mental Health Problem   and he is admitted to the hospital for the management of      Gary Jones is a 39 y.o. Non- / non  male who presents with Mental Health Problem   and is admitted to the hospital for the management of Hyponatremia.     According to patient, he was feeling suicidal with a definite plan after drinking heavily the prior night.  He presented to the ER via law enforcement for mental health evaluation.  His physical assessment was grossly unremarkable.  He endorses difficulty with taking his prescribed medications.     Upon presentation to the ER, the patient was mentally altered his sodium was 127 down from a normal of 139.  His ethanol level was 337.  Urine tox screen was negative.  By the time of exam, the patient was alert and oriented.     Plan to admit patient for hyponatremia.  Psych evaluation.  Suicidal precautions.  Alcohol withdrawa    Past Medical History:     Past Medical History:   Diagnosis Date    Depression with suicidal ideation 4/16/2024    HIV disease (HCC)         Past Surgical History:     Past Surgical History:   Procedure Laterality Date    MOUTH SURGERY Bilateral     OTHER SURGICAL HISTORY  04/27/2017    extubatio in OR    [place] OFFICE/OUTPT VISIT,PROCEDURE ONLY N/A 4/27/2018    EXTUBATION OF ARTIFICIAL AIRWAY performed by Kiana Camarena MD at [email or web address]] OR    [place] OPEN TREATMENT 
    [facility]   IN-PATIENT SERVICE   [facility]    Progress note            Date:   2/1/2025  Patient name:  Gary Jones  Date of admission:  1/28/2025  1:31 AM  MRN:   [id number]  Account:  [id number]  YOB: 1985  PCP:    No primary care provider on file.  Room:   2012/2012-01  Code Status:    Full Code    Chief Complaint:     Chief Complaint   Patient presents with    Mental Health Problem       History Obtained From:     patient    History of Present Illness:     The patient is a 39 y.o.  Non- / non  male who presents with Mental Health Problem   and he is admitted to the hospital for the management of      Gary Jones is a 39 y.o. Non- / non  male who presents with Mental Health Problem   and is admitted to the hospital for the management of Hyponatremia.     According to patient, he was feeling suicidal with a definite plan after drinking heavily the prior night.  He presented to the ER via law enforcement for mental health evaluation.  His physical assessment was grossly unremarkable.  He endorses difficulty with taking his prescribed medications.     Upon presentation to the ER, the patient was mentally altered his sodium was 127 down from a normal of 139.  His ethanol level was 337.  Urine tox screen was negative.  By the time of exam, the patient was alert and oriented.     Plan to admit patient for hyponatremia.  Psych evaluation.  Suicidal precautions.  Alcohol withdrawa    Past Medical History:     Past Medical History:   Diagnosis Date    Depression with suicidal ideation 4/16/2024    HIV disease (HCC)         Past Surgical History:     Past Surgical History:   Procedure Laterality Date    MOUTH SURGERY Bilateral     OTHER SURGICAL HISTORY  04/27/2017    extubatio in OR    [place] OFFICE/OUTPT VISIT,PROCEDURE ONLY N/A 4/27/2018    EXTUBATION OF ARTIFICIAL AIRWAY performed by Kiana Camarena MD at [facility] OR    [place] OPEN TREATMENT PALATAL/MAXILLARY 
    [facility]   IN-PATIENT SERVICE   [facility]    Progress note            Date:   1/30/2025  Patient name:  Gary Jones  Date of admission:  1/28/2025  1:31 AM  MRN:   [id number]  Account:  [id number]  YOB: 1985  PCP:    No primary care provider on file.  Room:   2012/2012-01  Code Status:    Full Code    Chief Complaint:     Chief Complaint   Patient presents with    Mental Health Problem       History Obtained From:     patient    History of Present Illness:     The patient is a 39 y.o.  Non- / non  male who presents with Mental Health Problem   and he is admitted to the hospital for the management of      Gary Jones is a 39 y.o. Non- / non  male who presents with Mental Health Problem   and is admitted to the hospital for the management of Hyponatremia.     According to patient, he was feeling suicidal with a definite plan after drinking heavily the prior night.  He presented to the ER via law enforcement for mental health evaluation.  His physical assessment was grossly unremarkable.  He endorses difficulty with taking his prescribed medications.     Upon presentation to the ER, the patient was mentally altered his sodium was 127 down from a normal of 139.  His ethanol level was 337.  Urine tox screen was negative.  By the time of exam, the patient was alert and oriented.     Plan to admit patient for hyponatremia.  Psych evaluation.  Suicidal precautions.  Alcohol withdrawa    Past Medical History:     Past Medical History:   Diagnosis Date    Depression with suicidal ideation 4/16/2024    HIV disease (HCC)         Past Surgical History:     Past Surgical History:   Procedure Laterality Date    MOUTH SURGERY Bilateral     OTHER SURGICAL HISTORY  04/27/2017    extubatio in [place]    [place] OFFICE/OUTPT VISIT,PROCEDURE ONLY N/A 4/27/2018    EXTUBATION OF ARTIFICIAL AIRWAY performed by Kiana Camarena MD at [facility] OR    [place] OPEN TREATMENT 
    [facility] Internal Medicine  Luis Monge MD; Evelio Irby MD; Dominic Mcpherson MD; MD Mckenna Joshi MD; Susan Mann MD  [facility] Internal Medicine   IN-PATIENT SERVICE  [facility]                 Date:   1/28/2025  Patientname:  Gary Jones  Date of admission:  1/28/2025  1:31 AM  MRN:   [id number]  Account:  [id number]  YOB: 1985  PCP:    No primary care provider on file.  [facility]  Code Status:    Full      Chief Complaint:     Chief Complaint   Patient presents with    Mental Health Problem       History of Present Illness:     Gary Jones is a 39 y.o. Non- / non  male who presents with Mental Health Problem   and is admitted to the hospital for the management of Hyponatremia.    According to patient, he was feeling suicidal with a definite plan after drinking heavily the prior night.  He presented to the ER via law enforcement for mental health evaluation.  His physical assessment was grossly unremarkable.  He endorses difficulty with taking his prescribed medications.    Upon presentation to the ER, the patient was mentally altered his sodium was 127 down from a normal of 139.  His ethanol level was 337.  Urine tox screen was negative.  By the time of exam, the patient was alert and oriented.    Plan to admit patient for hyponatremia.  Psych evaluation.  Suicidal precautions.  Alcohol withdrawal.  Past Medical History:     Past Medical History:   Diagnosis Date    Depression with suicidal ideation 4/16/2024    HIV disease (HCC)         Past Surgical History:     Past Surgical History:   Procedure Laterality Date    MOUTH SURGERY Bilateral     OTHER SURGICAL HISTORY  04/27/2017    extubatio in OR    [place] OFFICE/OUTPT VISIT,PROCEDURE ONLY N/A 4/27/2018    EXTUBATION OF ARTIFICIAL AIRWAY performed by Kiana C[facility]rena MD at [facility] [place]    [place] OPEN TREATMENT PALATAL/MAXILLARY FRACTURE N/A 4/25/2018    ORIF SYMPHYSIS, CLOSED 
   GI Progress notes    2/2/2025   9:28 AM    Name:  Gary Jones  MRN:    [id number]     Acct:     [id number]   Room:  [address] [place] [address] Day: 5     Admit Date: 1/28/2025  1:31 AM  PCP: No primary care provider on file.    Subjective:     C/C:   Chief Complaint   Patient presents with    Mental Health Problem       Interval History: Status: not changed.     Patient seen and examined.  No acute events overnight.  KUB yesterday showed nonobstructive bowel gas pattern, moderate stool  Lactic 1.2  Patient still has some lower abdominal pain/cramping.  He reports 3 stools since last evening.  Reports they were green.   Hgb remains stable, 12.2    ROS:  Constitutional: negative for chills, fevers and sweats  Gastrointestinal: negative for constipation, diarrhea, nausea and vomiting  Neurological: negative for dizziness and headaches    Medications:     Allergies: No Known Allergies    Current Meds: hydrocortisone 1 % cream, BID  LORazepam (ATIVAN) injection 1 mg, Q6H PRN  docusate sodium (COLACE) capsule 100 mg, Daily  sodium chloride flush 0.9 % injection 10 mL, PRN  potassium chloride (KLOR-CON M) extended release tablet 40 mEq, PRN   Or  potassium bicarb-citric acid (EFFER-K) effervescent tablet 40 mEq, PRN   Or  potassium chloride 10 mEq/100 mL IVPB (Peripheral Line), PRN  abacavir (ZIAGEN) tablet 600 mg, Daily   And  dolutegravir sodium (TIVICAY) tablet 50 mg, Daily   And  lamiVUDine (EPIVIR) tablet 300 mg, Daily  cefTRIAXone (ROCEPHIN) 2,000 mg in sodium chloride 0.9 % 50 mL IVPB (mini-bag), Q24H  pantoprazole (PROTONIX) 40 mg in sodium chloride (PF) 0.9 % 10 mL injection, Q12H  sodium chloride flush 0.9 % injection 5-40 mL, 2 times per day  sodium chloride flush 0.9 % injection 5-40 mL, PRN  0.9 % sodium chloride infusion, PRN  lidocaine PF 1 % injection 50 mg, Once  atorvastatin (LIPITOR) tablet 20 mg, Nightly  [Held by provider] FLUoxetine (PROZAC) capsule 20 mg, Daily  OLANZapine (ZYPREXA) tablet 15 mg, 
  [facility] Neurology   IN-PATIENT SERVICE      NEUROLOGY PROGRESS  NOTE            Date:   1/30/2025  Patient name:  Gary Jones  Date of admission:  1/28/2025  YOB: 1985      Interval History:     Underwent LP yesterday.  TNC 16 with lymphocytic predominance.  Protein WNL.  Infectious studies negative thus far.    Clinically improved.  He is currently off pressors.  Renal function improved.  He is more alert.  At time of evaluation, has no acute neurological complaints.  Denies headache, neck or back pain.  Infectious disease following.    History of Present Illness:     39-year-old male with past medical history of HIV, alcoholism, depression, who was initially admitted for suicidal ideation.  Neurology consulted for altered mentation, concern for serotonin syndrome/neuroleptic malignant syndrome.  History obtained from patient, care team, and chart review.     Patient initially presented to the ED yesterday for suicidal ideation.  He was found to have hyponatremia.  Sodium was 127.  He was intoxicated with ethanol level 337.  Initially, he appeared to be alert and oriented.  There was concern for possible serotonin syndrome as he was tachycardic, hyperthermic.  He was given cyproheptadine along with intermittent benzodiazepines.  Fluoxetine and olanzapine were held.  There was also concerns for ongoing diarrhea.  Then, overnight, he started getting hypotensive with increasing heart rate. Found to have worsening renal function with elevated CK.  He was started on Levophed for shock.  He continued to be tremulous.  Was transferred to the ICU.     At time of evaluation, appears patient's mentation is improved.  He is relatively oriented and able to follow commands.  He denies headache or neck pain.  No recent sick contacts.  States he is compliant with his HIV medications.  He denies any overdose on his medications including fluoxetine or olanzapine.    Past Medical History:     Past Medical History: 
  [facility] Neurology   IN-PATIENT SERVICE      NEUROLOGY PROGRESS  NOTE            Date:   2/1/2025  Patient name:  Gary Jones  Date of admission:  1/28/2025  YOB: 1985      Interval History:     2/1: No acute events.  Underwent MRI brain yesterday.  He is more alert, has remained off Precedex.  Currently oriented x 3.  At baseline.  No acute neurological complaints.    1/31: Started becoming agitated overnight.  Was started on Precedex gtt.  There is concern for possible alcohol withdrawals.  At time of evaluation, he is drowsy, Precedex was recently discontinued.  Has also been getting Ativan.  No acute neurological complaints.    ID has been following.  Stool culture positive for Shigella.    1/30: Underwent LP yesterday.  TNC 16 with lymphocytic predominance.  Protein WNL.  Infectious studies negative thus far.    Clinically improved.  He is currently off pressors.  Renal function improved.  He is more alert.  At time of evaluation, has no acute neurological complaints.  Denies headache, neck or back pain.  Infectious disease following.    History of Present Illness:     39-year-old male with past medical history of HIV, alcoholism, depression, who was initially admitted for suicidal ideation.  Neurology consulted for altered mentation, concern for serotonin syndrome/neuroleptic malignant syndrome.  History obtained from patient, care team, and chart review.     Patient initially presented to the ED yesterday for suicidal ideation.  He was found to have hyponatremia.  Sodium was 127.  He was intoxicated with ethanol level 337.  Initially, he appeared to be alert and oriented.  There was concern for possible serotonin syndrome as he was tachycardic, hyperthermic.  He was given cyproheptadine along with intermittent benzodiazepines.  Fluoxetine and olanzapine were held.  There was also concerns for ongoing diarrhea.  Then, overnight, he started getting hypotensive with increasing heart rate. Found 
  NEPHROLOGY CONSULT     Patient :  Gary Jones; 39 y.o. MRN# [id number]  Location:  2012/2012-01  Attending:  Susan Mann MD  Admit Date:  1/28/2025   Hospital Day: 2      Reason for Consult:  BETHANY/Hyponatremia      Chief Complaint: Suicidal ideation heavy alcohol intake  History Obtained From:  patient    History of Present Illness:    This is a 39 y.o. male with past medical history of mood disorder, HIV disease, on anti-HIV meds.  Patient presented to the hospital with complaints of suicidal ideation and heavy drinking.  Labs on admission showed serum sodium of 127 chloride 89 BUN 3 and creatinine of 0.8 mg/dL  Urine drug screen negative acetaminophen within normal limits salicylates within normal limits, high EtOH level.  Patient has been has having loose stools 3-4 episodes of loose stools for the last 3 to 4 days  Pt denies any history of  prolonged NSAID use.  Patient was noted to be hypotensive and was started on Levophed patient received IV fluids.  Labs this morning showed serum creatinine increased from 0.8 mg/dL to 3.1 mg/dL  Patient denies dysuria, gross hematuria, flank pain, nocturia, urgency, passing frothy urine or urinary incontinence.  There has been no recent exposure to IV contrast. There is no history  of paraprotein disease. Pt denies any history of recurrent UTI or kidney stones.  Medication review shows no use of ACE-inhibitor/diuretics.    Past Medical History:        Diagnosis Date    Depression with suicidal ideation 4/16/2024    HIV disease (HCC)        Past Surgical History:        Procedure Laterality Date    MOUTH SURGERY Bilateral     OTHER SURGICAL HISTORY  04/27/2017    extubatio in OR    [place] OFFICE/OUTPT VISIT,PROCEDURE ONLY N/A 4/27/2018    EXTUBATION OF ARTIFICIAL AIRWAY performed by Kiana Camarena MD at [facility] OR    [place] OPEN TREATMENT PALATAL/MAXILLARY FRACTURE N/A 4/25/2018    ORIF SYMPHYSIS, CLOSED REDUCTION LEFT RAMUS performed by Eliseo Barrow DDS at [facility] OR       Current 
  NEPHROLOGY Progress note    Patient :  Gary Jones; 39 y.o. MRN# [id number]  Location:  2012/2012-01  Attending:  Susan Mann MD  Admit Date:  1/28/2025   Hospital Day: 4      Reason for Consult:  BETHANY/Hyponatremia      Chief Complaint: Suicidal ideation heavy alcohol intake  History Obtained From:  patient    Subjective/interval history  Patient seen and examined, is awake alert oriented, comfortable and calm.  Serum sodium 140  No new complaint  Able to eat and drink  Continue to have diarrhea      History of Present Illness:    This is a 39 y.o. male with past medical history of mood disorder, HIV disease, on anti-HIV meds.  Patient presented to the hospital with complaints of suicidal ideation and heavy drinking.  Labs on admission showed serum sodium of 127 chloride 89 BUN 3 and creatinine of 0.8 mg/dL  Urine drug screen negative acetaminophen within normal limits salicylates within normal limits, high EtOH level.  Patient has been has having loose stools 3-4 episodes of loose stools for the last 3 to 4 days  Pt denies any history of  prolonged NSAID use.  Patient was noted to be hypotensive and was started on Levophed patient received IV fluids.  Labs this morning showed serum creatinine increased from 0.8 mg/dL to 3.1 mg/dL  Patient denies dysuria, gross hematuria, flank pain, nocturia, urgency, passing frothy urine or urinary incontinence.  There has been no recent exposure to IV contrast. There is no history  of paraprotein disease. Pt denies any history of recurrent UTI or kidney stones.  Medication review shows no use of ACE-inhibitor/diuretics.    Past Medical History:        Diagnosis Date    Depression with suicidal ideation 4/16/2024    HIV disease (HCC)        Past Surgical History:        Procedure Laterality Date    MOUTH SURGERY Bilateral     OTHER SURGICAL HISTORY  04/27/2017    extubatio in OR    [place] OFFICE/OUTPT VISIT,PROCEDURE ONLY N/A 4/27/2018    EXTUBATION OF ARTIFICIAL AIRWAY performed 
  NEPHROLOGY Progress note    Patient :  Gary Jones; 39 y.o. MRN# [id number]  Location:  2012/2012-01  Attending:  Susan Mann MD  Admit Date:  1/28/2025   Hospital Day: 3      Reason for Consult:  BETHANY/Hyponatremia      Chief Complaint: Suicidal ideation heavy alcohol intake  History Obtained From:  patient    Subjective/last 24 hr update:  Patient seen and examined, Patient is started on precedex today for alcohol withdrawal and he is sleeping  Hypothermia Temp 95.9 and 95.8, sally hugger in place  Blood pressure stable, on room air  Na 134 > 140 today, On D5 started 1/31, DC once sodium level <138   I/O 1780 ml with 1x undocumented   K 3.9, Cl 112, Scr 0.9, GFR >90, BUN 7  FMS had blood in output, lovenox on hold and GI consulted by primary    History of Present Illness:    This is a 39 y.o. male with past medical history of mood disorder, HIV disease, on anti-HIV meds.  Patient presented to the hospital with complaints of suicidal ideation and heavy drinking.  Labs on admission showed serum sodium of 127 chloride 89 BUN 3 and creatinine of 0.8 mg/dL  Urine drug screen negative acetaminophen within normal limits salicylates within normal limits, high EtOH level.  Patient has been has having loose stools 3-4 episodes of loose stools for the last 3 to 4 days  Pt denies any history of  prolonged NSAID use.  Patient was noted to be hypotensive and was started on Levophed patient received IV fluids.  Labs this morning showed serum creatinine increased from 0.8 mg/dL to 3.1 mg/dL  Patient denies dysuria, gross hematuria, flank pain, nocturia, urgency, passing frothy urine or urinary incontinence.  There has been no recent exposure to IV contrast. There is no history  of paraprotein disease. Pt denies any history of recurrent UTI or kidney stones.  Medication review shows no use of ACE-inhibitor/diuretics.    Past Medical History:        Diagnosis Date    Depression with suicidal ideation 4/16/2024    HIV disease 
  [facility] Neurology   IN-PATIENT SERVICE      NEUROLOGY PROGRESS  NOTE            Date:   1/31/2025  Patient name:  Gary Jones  Date of admission:  1/28/2025  YOB: 1985      Interval History:     1/31: Started becoming agitated overnight.  Was started on Precedex gtt.  There is concern for possible alcohol withdrawals.  At time of evaluation, he is drowsy, Precedex was recently discontinued.  Has also been getting Ativan.  No acute neurological complaints.    ID has been following.  Stool culture positive for Shigella.    1/30: Underwent LP yesterday.  TNC 16 with lymphocytic predominance.  Protein WNL.  Infectious studies negative thus far.    Clinically improved.  He is currently off pressors.  Renal function improved.  He is more alert.  At time of evaluation, has no acute neurological complaints.  Denies headache, neck or back pain.  Infectious disease following.    History of Present Illness:     39-year-old male with past medical history of HIV, alcoholism, depression, who was initially admitted for suicidal ideation.  Neurology consulted for altered mentation, concern for serotonin syndrome/neuroleptic malignant syndrome.  History obtained from patient, care team, and chart review.     Patient initially presented to the ED yesterday for suicidal ideation.  He was found to have hyponatremia.  Sodium was 127.  He was intoxicated with ethanol level 337.  Initially, he appeared to be alert and oriented.  There was concern for possible serotonin syndrome as he was tachycardic, hyperthermic.  He was given cyproheptadine along with intermittent benzodiazepines.  Fluoxetine and olanzapine were held.  There was also concerns for ongoing diarrhea.  Then, overnight, he started getting hypotensive with increasing heart rate. Found to have worsening renal function with elevated CK.  He was started on Levophed for shock.  He continued to be tremulous.  Was transferred to the ICU.     At time of evaluation, 
  Physician Progress Note      PATIENT:               BRIGIDA BRIONES  CSN #:                  [id number]  :                       1985  ADMIT DATE:       2025 1:31 AM  DISCH DATE:  RESPONDING  PROVIDER #:        Delaney Coombs MD          QUERY TEXT:    Pt admitted with Hyponatremia, hypotension, diarrhea, HIV Infection, ETOH   withdrawal and has encephalopathy documented. If possible, please document in   progress notes and discharge summary further specificity regarding the type of   encephalopathy:    The medical record reflects the following:  Risk Factors: ETOH Abuse & Withdrawal  Clinical Indicators: Hyponatremia, hypotension, diarrhea, HIV Infection. Per   notes: Encephalopathy and anuric BETHANY, plan was to start him on hemodialysis   and started on pressors.  Treatment: PICC line placed. IV Levophed, Zosyn, Acyclovir, & Thiame.  Options provided:  -- Metabolic encephalopathy  -- Septic encephalopathy, present on admission.  -- Alcoholic encephalopathy  -- Wernicke?s encephalopathy  -- Other - I will add my own diagnosis  -- Disagree - Not applicable / Not valid  -- Disagree - Clinically unable to determine / Unknown  -- Refer to Clinical Documentation Reviewer    PROVIDER RESPONSE TEXT:    This patient has metabolic encephalopathy.    Query created by: Pari Jones on 2025 10:16 AM      QUERY TEXT:    Pt admitted with Hypotension, Encephalopathy, HIV Infection, diarrhea and has   shock documented. Initial BP 67/47, Pulse 137. , Procalcitonin >100. If   possible, please document in progress notes and discharge summary further   specificity regarding the type of shock:    The medical record reflects the following:  Risk Factors: ETOH Abuse & Withdrawal  Clinical Indicators: Hypotension, Encephalopathy, HIV Infection, diarrhea.   Initial BP 67/47, Pulse 137. , Procalcitonin >100. Per notes: found to   have worsening renal function with elevated CK.  He was started on Levophed 
  Physician Progress Note      PATIENT:               BRIGIDA BRIONES  CSN #:                  [id number]  :                       1985  ADMIT DATE:       2025 1:31 AM  DISCH DATE:  RESPONDING  PROVIDER #:        Delaney Coombs MD          QUERY TEXT:    Pt admitted with Hyponatremia, Hypotension, & Encephalopathy. Pt noted to have   HIV. Per  ID: HIV infection. Per notes: Symptoms secondary to acute HIV   infection/immune reconstitution syndrome. If possible, please clarify in   progress notes and discharge summary the patient?s HIV status as:    The medical record reflects the following:  Risk Factors: Hyponatremia, Hypotension, & Encephalopathy.  Clinical Indicators: Pt noted to have HIV. Per  ID: HIV infection. Per   notes: Symptoms secondary to acute HIV infection/immune reconstitution   syndrome. Medication CD4 down to 12  Treatment:  Bactrim/azithromycin  Options provided:  -- HIV disease/AIDS with current Symptomatic HIV infection.  -- Asymptomatic HIV  -- Other - I will add my own diagnosis  -- Disagree - Not applicable / Not valid  -- Disagree - Clinically unable to determine / Unknown  -- Refer to Clinical Documentation Reviewer    PROVIDER RESPONSE TEXT:    This patient has HIV disease/AIDS with current Symptomatic HIV infection.    Query created by: Pari Jones on 2025 10:34 AM      QUERY TEXT:    Pt admitted with Hypotension, Encephalopathy, HIV Infection, diarrhea.    Initial BP 67/47, Pulse 137. , Procalcitonin >100. Per ID: Systemic   inflammatory response syndrome/high fever with acute encephalopathy, no   obvious source of infection. If possible, please document in the progress   notes and discharge summary if you are evaluating and /or treating any of the   following:    The medical record reflects the following:  Risk Factors: Hypotension, Encephalopathy, HIV Infection, diarrhea.  Clinical Indicators: Initial BP 67/47, Pulse 137. , Procalcitonin 
B JAMAAL Balbuena notified of pt having bloody drainage from rectal tube, order received for Protonix BID and hold Lovenox  
B JAMAAL Balbuena, notified of pt temp 95.9 rectally, blanket warmer applied  
BEHAVIORAL HEALTH FOLLOW-UP NOTE     1/30/2025     Patient was seen and examined in person, Chart reviewed   Patient's case discussed with staff/team    Chief Complaint: Suicidal ideation    Interim History:     The patient has remained tachycardic, most recently having a pulse of 107. Zyprexa and Prozac continue to be held. He has been receiving continuous Ativan. He remains hyponatremic with a sodium of 131. His BETHANY has resolved. He has an elevated CRP at 322 and Procalcitonin >100. This patient is likely suffering from an underlying infectious etiology rather than serotonin syndrome. He was seen resting in bed with 1:1 sitter present. He spoke mainly with single-word responses and did not expand on his answers. He said his mood was \"okay\". He denied feelings of depressed mood or anxiety. He denied any suicidal or homicidal ideations. He denied any auditory or visual hallucinations. The patient was oriented to person, place, time, and situation. He reported mild improvement in his energy, sleep, and appetite.     /71   Pulse (!) 107   Temp 98.4 °F (36.9 °C) (Oral)   Resp 30   Ht 1.651 m (5' 5\")   Wt 65.8 kg (145 lb)   SpO2 97%   BMI 24.13 kg/m²   Appetite:   [x] Normal/Unchanged  [] Increased  [] Decreased      Sleep:       [] Normal/Unchanged  [x] Fair       [] Poor              Energy:    [x] Normal/Unchanged  [] Increased  [] Decreased        Aggression:  [] yes  [x] no    Patient is [] able  [] unable to CONTRACT FOR SAFETY ON THE UNIT    PAST MEDICAL/PSYCHIATRIC HISTORY:   Past Medical History:   Diagnosis Date    Depression with suicidal ideation 4/16/2024    HIV disease (HCC)        FAMILY/SOCIAL HISTORY:  Family History   Problem Relation Age of Onset    High Blood Pressure Mother      Social History     Socioeconomic History    Marital status: Single     Spouse name: Not on file    Number of children: Not on file    Years of education: Not on file    Highest education level: Not on file 
BEHAVIORAL HEALTH FOLLOW-UP NOTE     1/31/2025     Patient was seen and examined in person, Chart reviewed   Patient's case discussed with staff/team    Chief Complaint: Suicidal ideation    Interim History:     The patient's pulse has normalized to 81. He has been hypotensive at 95/72. His sodium has normalized to 141. He chloride was elevated at 112. The patient was restarted on Zyprexa on 1/30. He was started on a Precedex drip was started on 1/30 and ended at 12:30 PM on 1/31. Nursing staff reported the patient had been very somnolent and disoriented today. The patient was seen in his room today with 1:1 sitter present. He was sleeping but woke up when his name was called and his arm was moved. The patient was disoriented and was slow to answer questions. He was not oriented to place but was oriented to time and who the President was. When asked about his mood, the patient could not express how he felt. When asked if he felt anxious, he responded, \"I probably have felt like that.\" He denied any feelings of depressed mood, hopelessness, or guilt. He endorses having fatigue and increased desire for sleep. He denies any suicidal or homicidal ideation. He denied any auditory or visual hallucinations.     BP 95/72   Pulse 81   Temp 97.4 °F (36.3 °C) (Axillary) Comment: bear5 hugger off  Resp 22   Ht 1.651 m (5' 5\")   Wt 65.8 kg (145 lb)   SpO2 98%   BMI 24.13 kg/m²   Appetite:   [x] Normal/Unchanged  [] Increased  [] Decreased      Sleep:       [] Normal/Unchanged  [x] Fair       [] Poor              Energy:    [] Normal/Unchanged  [] Increased  [x] Decreased        Aggression:  [] yes  [x] no    Patient is [] able  [] unable to CONTRACT FOR SAFETY ON THE UNIT    PAST MEDICAL/PSYCHIATRIC HISTORY:   Past Medical History:   Diagnosis Date    Depression with suicidal ideation 4/16/2024    HIV disease (HCC)        FAMILY/SOCIAL HISTORY:  Family History   Problem Relation Age of Onset    High Blood Pressure Mother  
BEHAVIORAL HEALTH FOLLOW-UP NOTE     2/3/2025     Patient was seen and examined in person, Chart reviewed   Patient's case discussed with staff/team    Chief Complaint: Suicidal ideation    Interim History:     The patient's electrolytes have been stable. He has been hypertensive at 130/94. He has had some periods of tachycardia but most recently had a pulse of 72. He has received PRN Ativan today. The patient was seen face-to-face. He was preparing for discharge today. He was awake, alert, approachable, and smiled when the interviewer walked in. He reports that his mood is stable and denied any feelings of anxiety, depressed mood, guilt, or hopelessness. He feels comfortable with his current psychiatric medication of Prozac and Zyprexa. He feels safe for discharge. The patient denied any suicidal or homicidal ideation. He denied any visual or auditory hallucinations.     BP (!) 130/94   Pulse 72   Temp 98.2 °F (36.8 °C)   Resp 16   Ht 1.651 m (5' 5\")   Wt 71.1 kg (156 lb 12 oz)   SpO2 100%   BMI 26.08 kg/m²   Appetite:  [x] Normal/Unchanged  [] Increased  [] Decreased      Sleep:       [x] Normal/Unchanged  [] Fair       [] Poor              Energy:    [x] Normal/Unchanged  [] Increased  [] Decreased        Aggression:  [] yes  [x] no    Patient is [] able  [] unable to CONTRACT FOR SAFETY ON THE UNIT    PAST MEDICAL/PSYCHIATRIC HISTORY:   Past Medical History:   Diagnosis Date    Depression with suicidal ideation 4/16/2024    HIV disease (HCC)        FAMILY/SOCIAL HISTORY:  Family History   Problem Relation Age of Onset    High Blood Pressure Mother      Social History     Socioeconomic History    Marital status: Single     Spouse name: Not on file    Number of children: Not on file    Years of education: Not on file    Highest education level: Not on file   Occupational History    Not on file   Tobacco Use    Smoking status: Every Day     Current packs/day: 0.50     Types: Cigarettes    Smokeless tobacco: 
CLINICAL PHARMACY NOTE: MEDS TO BEDS    Total # of Prescriptions Filled: 2   The following medications were delivered to the patient:  Losartan Potassium 25MG Tablets   Metoprolol Succinate ER 25MG Tablets     Additional Documentation:  Delivered to the room and signed for by the PT at 4:58PM 2/3/25  
Continuous telemetry monitoring ordered by JAMAAL yañez.   
Date:   1/29/2025  Patient name: Gary Jones  Date of admission:  1/28/2025  1:31 AM  MRN:   [id number]  YOB: 1985  PCP: No primary care provider on file.    Reason for Admission: Suicidal ideation [R45.851]  Hyponatremia [E87.1]    Cardiology follow-up: Tachycardia       Referring physician: Dr Delaney Coombs     Impression  Sinus tachycardia heart rate up to 160 or above patient on fluoxetine and olanzapine/serotonin syndrome,  patient is also having fever 103.1  Alcohol abuse, alcohol level on admission 337 mg/dl  Previous history of HIV  History of depression/anxiety/suicidal ideation  Severe hyponatremia/probable heavy amount of beer conjunction  Elevated lymphocyte count     History of present illness      39-year-old male with a past medical history of mental health problem, history of depression, alcoholism, HIV who was initially admitted with suicidal ideation.on admission he had hyponatremia, also has a fever 103.1 and sinus tachycardia, probable serotonin syndrome.  He was intoxicated by ethanol ethanol level was 337 .he was given Cipro hepatoid in along with the intermittent benzodiazepine, fluoxetine and olanzapine were held for suspicion of serotonin syndrome .  He became hypotensive with increasing heart rate, worsening of the renal function with elevated CK.  He was started on Levophed and transferred to ICU.  He received IV fluids and IV albumin.     ECG on admission sinus tachycardia heart rate 160 high voltage  Chest x-ray no acute process  Lab work on admission  Sodium 127 potassium 4.3 chloride 89 BUN 3 creatinine 0.8 glucose 100 serum osmolality 279, total CK2 97, albumin 4.3, bilirubin total 0.3  Serum alcohol 337 Mg per deciliter, percentage 0.337  WBC 5.1 hemoglobin 14.4 , platelets 250, lymphocyte 54 neutrophil 26    Current evaluation  Patient seen and examined medications and labs checked  He is under constant supervision he has a sitter in the room  He was resting with 
Date:   1/30/2025  Patient name: Gary Jones  Date of admission:  1/28/2025  1:31 AM  MRN:   [id number]  YOB: 1985  PCP: No primary care provider on file.    Reason for Admission: Suicidal ideation [R45.851]  Hyponatremia [E87.1]    Cardiology follow-up: Tachycardia       Referring physician: Dr Delaney Coombs     Impression  Sinus tachycardia heart rate up to 160 or above patient on fluoxetine and olanzapine/serotonin syndrome,  patient is also having fever 103.1  Alcohol abuse, alcohol level on admission 337 mg/dl  Previous history of HIV  History of depression/anxiety/suicidal ideation  Severe hyponatremia/probable heavy amount of beer conjunction  Elevated lymphocyte count    History of present illness  39-year-old male with a past medical history of mental health problem, history of depression, alcoholism, HIV who was initially admitted with suicidal ideation.on admission he had hyponatremia, also has a fever 103.1 and sinus tachycardia, probable serotonin syndrome.  He was intoxicated by ethanol ethanol level was 337 .he was given Cipro hepatoid in along with the intermittent benzodiazepine, fluoxetine and olanzapine were held for suspicion of serotonin syndrome .  He became hypotensive with increasing heart rate, worsening of the renal function with elevated CK.  He was started on Levophed and transferred to ICU.  He received IV fluids and IV albumin.    ECG on admission sinus tachycardia heart rate 160 high voltage  Chest x-ray no acute process  Lab work on admission  Sodium 127 potassium 4.3 chloride 89 BUN 3 creatinine 0.8 glucose 100 serum osmolality 279, total CK2 97, albumin 4.3, bilirubin total 0.3  Serum alcohol 337 Mg per deciliter, percentage 0.337  WBC 5.1 hemoglobin 14.4 , platelets 250, lymphocyte 54 neutrophil 26      Current evaluation  Patient seen and examined medications and labs checked  He was resting with couple of pillows did not appear in any distress  Denied any 
Date:   1/31/2025  Patient name: Gary Jones  Date of admission:  1/28/2025  1:31 AM  MRN:   [id number]  YOB: 1985  PCP: No primary care provider on file.    Reason for Admission: Suicidal ideation [R45.851]  Hyponatremia [E87.1]    Cardiology follow-up: Tachycardia, moderately reduced LV systolic function ejection fraction 35 to 40%       Referring physician: Dr Delaney Coombs     Impression    Sinus tachycardia heart rate up to 160 or above patient on fluoxetine and olanzapine/serotonin syndrome,  patient is also having fever 103.1, significant improvement in the tachycardia since admission and patient is afebrile  Alcohol abuse, alcohol level on admission 337 mg/dl  Moderately reduced LV systolic function ejection fraction 35 to 40% 2D echo 1/30/2025  BETHANY serum creatinine 3.1 on 1/29/2025, patient had severe hypotension significant improvement over the last couple of days serum creatinine 0.7 on 1/31/2025  Previous history of HIV, started on HIV medication  History of depression/anxiety/suicidal ideation  Severe hyponatremia admission/probable heavy amount of beer conjunction  Elevated lymphocyte count     History of present illness  39-year-old male with a past medical history of mental health problem, history of depression, alcoholism, HIV who was initially admitted with suicidal ideation.on admission he had hyponatremia, also has a fever 103.1 and sinus tachycardia, probable serotonin syndrome.  He was intoxicated by ethanol ethanol level was 337 .he was given Cipro hepatoid in along with the intermittent benzodiazepine, fluoxetine and olanzapine were held for suspicion of serotonin syndrome .  He became hypotensive with increasing heart rate, worsening of the renal function with elevated CK.  He was started on Levophed and transferred to ICU.  He received IV fluids and IV albumin.     ECG on admission sinus tachycardia heart rate 160 high voltage  Chest x-ray no acute process  Lab work on 
Date:   2/2/2025  Patient name: Gary Jones  Date of admission:  1/28/2025  1:31 AM  MRN:   [id number]  YOB: 1985  PCP: No primary care provider on file.    Reason for Admission: Suicidal ideation [R45.851]  Hyponatremia [E87.1]    Cardiology follow-up: Tachycardia, moderately reduced LV systolic function ejection fraction 35 to 40%        Referring physician: Dr Delaney Coombs     Impression     Sinus tachycardia heart rate up to 160 or above patient on fluoxetine and olanzapine/serotonin syndrome,  patient is also having fever 103.1, significant improvement in the tachycardia since admission and patient is afebrile  Alcohol abuse, alcohol level on admission 337 mg/dl  Metabolic encephalopathy  Moderately reduced LV systolic function ejection fraction 35 to 40% 2D echo 1/30/2025  BETHANY serum creatinine 3.1 on 1/29/2025, patient had severe hypotension significant improvement over the last couple of days serum creatinine 0.7 on 1/31/2025  Previous history of HIV, started on HIV medication  History of depression/anxiety/suicidal ideation  Severe hyponatremia admission/probable heavy amount of beer conjunction  Elevated lymphocyte count  Suicidal ideation     History of present illness  39-year-old male with a past medical history of mental health problem, history of depression, alcoholism, HIV who was initially admitted with suicidal ideation.on admission he had hyponatremia, also has a fever 103.1 and sinus tachycardia, probable serotonin syndrome.  He was intoxicated by ethanol ethanol level was 337 .he was given Cipro hepatoid in along with the intermittent benzodiazepine, fluoxetine and olanzapine were held for suspicion of serotonin syndrome .  He became hypotensive with increasing heart rate, worsening of the renal function with elevated CK.  He was started on Levophed and transferred to ICU.  He received IV fluids and IV albumin.     ECG on admission sinus tachycardia heart rate 160 high 
Diana NP notified of patient hypotension. Systolic 70-80s. HR 130s. One time dose 2.5 mg lopressor ordered. Carlyle notified of order and patients current BP. 5 mg midodrine ordered.                    
Discussed patient current condition and presentation with pharmacist Janusz. Patient condition has deteriorated overnight. He is hypotensive SBP 60-70s and labs show impending renal failure, creatinine increased from 0.8 to 3.1. Nephrology is aware and has requested critical care consult and initiation of levophed, general surgery consulted for Eugene catheter placement.     Patient was only given 12 mg initial dose of cyproheptadine 12 mg at 1835 and 1 dose of 4 mg at 2036. Additional doses were not administered by bedside RN due to tachycardia. With renal function impairment and elevated CK 1487, pharmacy recommended holding cyproheptadine and to treat muscle rigidity with ativan 2 mg every 4 hours prn.     Also discussed potential of patient's presentation being related to NMS vs serotonin syndrome especially with critical rise in CK and renal failure.   
Dr Magana calls in, aware sodium level 140 this AM, orders received   
Dr Sarmiento notified of pt agitation, doses of Ativan given in addition to zyprexa but pt continues agitated, order received for Precedex gtt  
Dr. Carlyle torres served about patients labs and status. BP 60-70s systolic. Creatine 3.1. Mag 0.8. Patient transferred to ICU status and levo ordered. General surgery consulted for Eugene cath placement.   
Dr. Coombs to room to evaluate patient after HR noted to be 180 on telemetry. New orders of 5 mg IV lopressor once, inpatient consult to cardiology, and 12 lead EKG.    
Dr. Magana calls unit, writer updates MD on morning labs and urine output. MD would like to d/c D5W and switch to NS at 50mL/hr. MD would also like 40 meq K+ replacement and is okay with sliding scale for replacement.  
Dr. Magana contacted regarding the patients bicarb going from 21 to 14 since yesterday. Dr. Magana was updated of the patients labs and vitals. Dr. Magana discontinued normal saline and started a bicarb drip. Patient also started on a 500ml bolus and 25g of Albumin. Also okayed patient for a Eugene catheter incase dialysis is needed due to creatine.   
Dr. Magana notified during rounds about the patient having over 2L of urine output since the daly was placed.   
Dr. Matute (Cardiology) put on patient list for consult.  
Dr. Mcpherson notified via perfect serve of patient requesting pain medication.  
Dr. Shilo escalante, no needs for admission to [facility], 1:1 discontinued at this time.   
Dr. Yost (Infectious Diseases)  
Dr. Yost notified of CD4 result.   
General surgery consult to Dr. Aicha mcallister.   
Informed Dr. Magana thru secure message regarding the patient's latest sodium of 138. MD was informed that the patient is able to drink and eat today. MD ordered to stop IV fluid.   
[facility]  PROGRESS NOTE    Shift date: 1/30/2025  Shift day: Thursday   Shift # 1    Room # 2012/2012-01   Name: Gary Jones                [facility]: Unknown   [facility]: Unknown    Referral: Routine Visit    Admit Date & Time: 1/28/2025  1:31 AM    Assessment:  Gary Jones is a 39 y.o. male in the hospital. Upon entering the room writer observes patient lying in bed.  There is a patient sitter present in the room.        Intervention:  Writer introduced self and title as chaplain Charissa from [facility]. Writer offered space for patient  to express feelings, needs, and concerns and provided a ministry presence. Patient offers that he feels he is doing \"ok\" today, and states, \"I am doing the best I can.\" This writer affirms his feelings.     Outcome:  Patient care is offered for testing from another department and visit is ended at that time.  The writer affirms the availability of [facility] and patient states he is receptive to another visit.    Plan:  Chaplains will remain available to offer spiritual and emotional support as needed.     01/30/25 1353   Encounter Summary   Encounter Overview/Reason Initial Encounter   Service Provided For Patient   Referral/Consult From Rounding   Last Encounter  01/30/25   Complexity of Encounter Low   Begin Time 1220   End Time  1225   Total Time Calculated 5 min   Assessment/Intervention/Outcome   Assessment Calm;Coping   Intervention Active listening;Sustaining Presence/Ministry of presence   Outcome Coping       Electronically signed by Chaplain SALLY, on 1/30/2025 at 1:54 PM.  [facility]      
Orders to pull picc line obtained by Janki HINTON  
Patient asked medication for anxiety, CIWA score is only 2. Informed NP VITO Alexandra and NP ordered to give Ativan 1 mg PRN for anxiety.   
Patient d/c'd home states he understands all d/c orders. He was walked downstairs with writer at his side to wait for a cab.  
Patient tolerated lumbar puncture without distress. 5 ml of clear fluid removed. Dressing to site. Patient returned to room. Nurse updated.     
Perfect serve sent to dr cabrera for a neurology consult.  
Psych notified of consult.  
Pt arrived to floor from ICU to room 2008.  Vitals taken, no telemetry orders at this time.  No distress noted. See doc flowsheet for details. Call light within reach, and pt educated on its use. Bed in lowest position, and locked. Side rails up x 2. Denied further questions or needs at this time.   
Pt complaining of 8/10 abdominal pain. NP, Janik Sawyer notified of new onset of pain. NP came to bedside to see pt. Colace and Mylicon ordered at this time.     Pt. Refused Colace due to current diarrhea. RN encouraged pt to take Mylicon due to KUB results. Pt happily agreed at this time.  
Pt cooperates with reapplication of both external cath and rectal pouch, medicated with Fentanyl as ordered for c/o abd pain  
Pt disconnected self from monitor, jumped from bed and closed door to bathroom, would not allow staff to assist him, security called and pt coaxed to leave bathroom and return to bed, pt anxious, forgetful, attempts to reorient not successful, pt placed in bilat soft wrist restraints due to continued attempts to leave hospital, pull at IV lines, need for restraints explained to pt, precedex gtt to be titrated to pt response  
Pt requesting nightly Trazodone for sleep. It is currently held due to prolonged Qtc. NP Janki Sawyer notified, orders for EKG obtained   
Pt speaking about \" going to dentist\" attempting to get out of bed, need to stay in hospital explained to him but he is insisting that he is leaving, multiple staff members in room, NP ALICIA Balbuena notified of pt c/o abd pain, nausea, statement of wishing to leave hospital, ALICIA Balbuena at bedside to speak to pt  
Pts belonging taken over to ICU, placed in med room with pt label  
RN called report to nurse back in ICU. Pt  transported back to ICU via bed attached to life pack.  
RN notified Dr. Coombs that pt is complaining of diarrhea. He said he was having it for a few days before admission. He was wondering if he could get something to help? Thank'    Orders received for Immodium.    RN also updated Dr. Coombs that patients temp was back up to 103 and coid & flu came back negative  
RN notified Dr. Tadeo of new patient consult;  Hello,   New patient consult for Hyponatremia. Most recent sodium was 124 from 830am. Currently has NS runing at 125 ml. Pts HR has been elevated and running a temp.      New orders received;  OK. Check it on sodium level every six hours. Continue 0.9 NS. Call me if sodium is less than 122 or greater than 139     Check serum osmolality Random urine osmolality Random urine sodium and chloride     
Report called to Tia in PCU.  
Spiritual Health History and Assessment/Progress Note  [facility]    (P) Spiritual/Emotional Needs,  ,  ,      Name: Gary Jones MRN: [id number]    Age: 39 y.o.     Sex: male   Language: English   Church: None   Hyponatremia     Date: 2/2/2025            Total Time Calculated: (P) 6 min              Family member in room while the patient was mostly resting but aware of visit. Both were doing well. Spoke briefly and offered active listening.     Chaplains should follow up tomorrow.     Spiritual Assessment continued in [facility] PROGRESSIVE CARE        Referral/Consult From: (P) Rounding   Encounter Overview/Reason: (P) Spiritual/Emotional Needs  Service Provided For: (P) Patient and family together    Milagro, Belief, Meaning:   Patient unable to assess at this time  Family/Friends have beliefs or practices that help with coping during difficult times      Importance and Influence:  Patient unable to assess at this time  Family/Friends have no beliefs influential to healthcare decision-making identified during this visit    Community:  Patient feels well-supported. Support system includes: Extended family  Family/Friends feel well-supported. Support system includes: Extended family    Assessment and Plan of Care:     Patient Interventions include: Facilitated expression of thoughts and feelings  Family/Friends Interventions include: Facilitated expression of thoughts and feelings    Patient Plan of Care: Spiritual Care available upon further referral  Family/Friends Plan of Care: Spiritual Care available upon further referral    Electronically signed by Chaplain Hank on 2/2/2025 at 11:47 AM    
Spoke to Dr. Magana regarding labs. New orders placed.  
The potassium/magnesium sliding scale has been automatically discontinued per Pharmacy and Therapeutic Committee approved policy because the patient has decreased renal function (CrCl<30 ml/min).  The patient's current K/Mag levels are currently:    Recent Labs     01/28/25  0158 01/29/25  0312   K 4.3 3.5*   MG  --  0.8*       Estimated Creatinine Clearance: 28 mL/min (A) (based on SCr of 3.1 mg/dL (H)).  For patients with decreased renal function (below 30ml/min) needing potassium/magnesium supplementation, please order individual bolus doses with appropriate monitoring.      Please contact the inpatient pharmacy at [phone] with any concerns.  Thank you.    Love Garcia RP  1/29/2025  10:06 AM   
Writer just spoke with [employer] regarding patients MRI ordered for today. MRI screening form completed with patient. MRI Tech stated she will let me know when patient is able to come down.   
Writer updated Dr. Magana of sodium of 137 and urine output.    MD states to notify is Na is <132 or >137 overnight.  
for: \"PHART\", \"PO2ART\", \"ADT2RMA\"   Lab Results   Component Value Date/Time    MODE [facility] 04/24/2018 04:09 AM     Ionized Calcium:  No components found for: \"IONCA\"  Magnesium:    Lab Results   Component Value Date/Time    MG 2.6 01/30/2025 03:31 AM     Phosphorus:  No results found for: \"PHOS\"     LIVER PROFILE   Recent Labs     01/28/25  0158   AST 48   ALT 28   BILITOT 0.3   ALKPHOS 79     INR   Recent Labs     01/29/25  0855   INR 1.3     PTT   Lab Results   Component Value Date    APTT 31.2 10/22/2023         RADIOLOGY     (See actual reports for details)    ASSESSMENT/PLAN     Patient Active Problem List   Diagnosis    Mandibular fracture, closed, closed, initial encounter ([facility])    HIV (human immunodeficiency virus infection) ([facility])    Burn    Depression with suicidal ideation    Severe recurrent major depression with psychotic features ([facility])    Suicidal ideation    Acute pancreatitis    Alcohol use disorder    Hyponatremia    BETHANY (acute kidney injury) ([facility])    Acute encephalopathy     Impression  Metabolic acidosis  Hypotension shock  Acute kidney injury  EtOH intoxication with history of EtOH abuse  Apparent suicidal ideation  HIV positive-----on Triumeq.  He claims he is taking his medication  He does smoke cigarettes.  Full code    Patient's kidney numbers are much better now back to normal  Patient is on acyclovir on Zosyn.  On enteroviral agents  CT scanning of chest revealed no acute process.  There may be a colitis present    Not much to add from the critical care standpoint.    Please call if we can be of further assistance.  We will sign off    RHINA Kirby      Electronically signed by Syed Ramirez MD on 1/30/2025 at 3:20 PM        
36.9 (L) 41 - 53 %    .1 (H) 80 - 100 fL    MCH 35.1 (H) 26 - 34 pg    MCHC 33.1 31 - 37 g/dL    RDW 14.5 11.5 - 14.9 %    Platelets 178 150 - 450 k/uL    MPV 7.9 6.0 - 12.0 fL    Neutrophils % PENDING %    Lymphocytes % PENDING %    Monocytes % PENDING %    Eosinophils % PENDING %    Basophils % PENDING %    Neutrophils Absolute PENDING k/uL    Lymphocytes Absolute PENDING k/uL    Monocytes Absolute PENDING k/uL    Eosinophils Absolute PENDING k/uL    Basophils Absolute PENDING 0.0 - 0.2 k/uL   Magnesium    Collection Time: 01/29/25  3:12 AM   Result Value Ref Range    Magnesium 0.8 (LL) 1.6 - 2.6 mg/dL   CK    Collection Time: 01/29/25  3:12 AM   Result Value Ref Range    Total CK 1,487 (H) 39 - 308 U/L       Imaging/Diagnostics:        Assessment :      Primary Problem  Hyponatremia    Active Hospital Problems    Diagnosis Date Noted    Hyponatremia [E87.1] 01/28/2025       Plan:     Patient status Admit as inpatient in the  Progressive Unit/Step down    Patient is a 39-year-old male with past medical history of HIV, depression anxiety, alcohol abuse, presented to the [facility] depression suicidal ideation, found to be have severe hyponatremia  Severe hyponatremia, likely secondary to underlying beer potomania and dehydration patient has been having poor p.o. intake, will check TSH urine studies, consult nephrology patient currently on IV fluids, check sodium every 4,  High suspicion of serotonin syndrome, patient tachycardic hyperthermic, tremulous,, diarrhea though no hyperreflexia or clonus noticed, is alert and awake will give cyproheptadine, 12 mg and then 2 mg every 6 hour, hold onto fluoxetine and olanzapine, Ativan as needed, IV hydration, will transfer to intermediate ICU for next 24 hours to get monitor closely,, CK is normal  hiv, home medication resumed, patient has been febrile tachycardic, no source of infection could be found will get CD4 count, ID consulted, start Zosyn for now, get blood 
acid,  Depression suicidal ideation psych consulted hold all to SSRI,  Suicidal precaution sitter in place  Sinus tachycardia secondary to serotonin syndrome, continue hydration Lopressor as needed  Transfer to intermediate ICU for closer monitoring for next 24 hours  1 1/30  Clinically improved, off pressors, patient alert oriented, no fever since yesterday, off pressors tachycardia improved        Note addended    39-year-old male with history of alcohol abuse depression anxiety primarily got admitted for suicidal ideation, developed severe hyponatremia sodium dropped to 123, patient was also tachycardic with heart rate up to 150s, temperature of 104 Tmax, there was concern of severe sepsis/serotonin syndrome,  History of HIV immunocompromised, started on broad-spectrum antibiotics and ID consulted  Patient decompensated on the night of 1/28 developed shock septic?  Though low suspicion but concerning due to his HIV status \encephalopathy and anuric BETHANY, plan was to start him on hemodialysis and started on pressors,  Started on IV lorazepam for concern of serotonin syndrome as no other source of sepsis and shock could be found, though not typical for serotonin syndrome did have some muscle rigidity and elevated CK no hyperreflexia or clonus, atypical serotonin?  Patient not able to safely took extra dose of Prozac but did come with suicidal ideation  Neurology consulted  HIV report compliance, correction his CD4 count absolute is 330 and not 12, is 12%  Patient said that he was started on antiviral 2 to 3 months ago,  Had LP last night, negative for any infection  No meningeal signs  Cultures negative so far  Chest abdominal CT consistent with colitis  Unlikely with no abdominal symptoms and benign abdominal exam  Continues to have diarrhea/FMS in place  BETHANY has resolved, never needed dialysis,  Hyponatremia severe sodium overcorrected, patient received a dose of desmopressin, Pro-Pedro CRP very high secondary to

## 2025-02-03 NOTE — CARE COORDINATION
ONGOING DISCHARGE PLAN:    Patient is alert and oriented x4.    Spoke with patient regarding discharge plan and patient confirms that plan is still to discharge to home with no needs    No need for BHI    ATB last dose today     Plan is to discharge to home today      Will continue to follow for additional discharge needs.    If patient is discharged prior to next notation, then this note serves as note for discharge by case management.    Electronically signed by Tsering Santiago on 2/3/2025 at 2:17 PM

## 2025-02-03 NOTE — PLAN OF CARE
Problem: Discharge Planning  Goal: Discharge to home or other facility with appropriate resources  1/29/2025 1555 by Elin العراقي RN  Outcome: Progressing  Flowsheets (Taken 1/29/2025 0758)  Discharge to home or other facility with appropriate resources: Identify barriers to discharge with patient and caregiver  1/29/2025 0601 by Sondra Kurtz RN  Outcome: Progressing     Problem: Neurosensory - Adult  Goal: Absence of seizures  1/29/2025 1555 by Elin العراقي RN  Outcome: Progressing  Flowsheets (Taken 1/29/2025 1555)  Absence of seizures: Monitor for seizure activity.  If seizure occurs, document type and location of movements and any associated apnea  1/29/2025 0601 by Sondra uKrtz RN  Outcome: Progressing  Goal: Remains free of injury related to seizures activity  1/29/2025 1555 by Elin العراقي RN  Outcome: Progressing  Flowsheets (Taken 1/29/2025 1555)  Remains free of injury related to seizure activity: Maintain airway, patient safety  and administer oxygen as ordered  1/29/2025 0601 by Sondra Kurtz RN  Outcome: Progressing     Problem: Metabolic/Fluid and Electrolytes - Adult  Goal: Electrolytes maintained within normal limits  1/29/2025 1555 by Elin العراقي RN  Outcome: Progressing  Flowsheets (Taken 1/29/2025 0758)  Electrolytes maintained within normal limits: Monitor labs and assess patient for signs and symptoms of electrolyte imbalances  1/29/2025 0601 by Sondra Kurtz RN  Outcome: Progressing     Problem: Self Harm/Suicidality  Goal: Will have no self-injury during hospital stay  Description: INTERVENTIONS:  1.  Ensure constant observer at bedside with Q15M safety checks  2.  Maintain a safe environment  3.  Secure patient belongings  4.  Ensure family/visitors adhere to safety recommendations  5.  Ensure safety tray has been added to patient's diet order  6.  Every shift and PRN: Re-assess suicidal risk via Frequent Screener    1/29/2025 1555 by Elin العراقي RN  Outcome: 
  Problem: Discharge Planning  Goal: Discharge to home or other facility with appropriate resources  2/1/2025 0139 by Sawyer Faulkner RN  Outcome: Progressing  Flowsheets (Taken 1/31/2025 2000)  Discharge to home or other facility with appropriate resources:   Identify discharge learning needs (meds, wound care, etc)   Identify barriers to discharge with patient and caregiver     Problem: Neurosensory - Adult  Goal: Absence of seizures  2/1/2025 0139 by Sawyer Faulkner RN  Outcome: Progressing  Flowsheets (Taken 1/31/2025 2000)  Absence of seizures:   Monitor for seizure activity.  If seizure occurs, document type and location of movements and any associated apnea   If seizure occurs, turn head to side and suction secretions as needed   Administer anticonvulsants as ordered   Support airway/breathing, administer oxygen as needed    Problem: Neurosensory - Adult  Goal: Remains free of injury related to seizures activity  2/1/2025 0139 by Sawyer Faulkner RN  Outcome: Progressing  Flowsheets (Taken 1/31/2025 2000)  Remains free of injury related to seizure activity:   Maintain airway, patient safety  and administer oxygen as ordered   Monitor patient for seizure activity, document and report duration and description of seizure to Licensed Independent Practitioner   Seizure pads on all 4 side rails   If seizure occurs, turn patient to side and suction secretions as needed     Problem: Metabolic/Fluid and Electrolytes - Adult  Goal: Electrolytes maintained within normal limits  2/1/2025 0139 by Sawyer Faulkner RN  Outcome: Progressing  Flowsheets (Taken 1/31/2025 2000)  Electrolytes maintained within normal limits:   Monitor labs and assess patient for signs and symptoms of electrolyte imbalances   Administer electrolyte replacement as ordered   Monitor response to electrolyte replacements, including repeat lab results as appropriate     Problem: Skin/Tissue Integrity - 
  Problem: Discharge Planning  Goal: Discharge to home or other facility with appropriate resources  2/2/2025 1630 by Linda Abbott RN  Outcome: Progressing     Problem: Neurosensory - Adult  Goal: Absence of seizures  2/2/2025 1630 by Linda Abbott RN  Outcome: Progressing     Problem: Neurosensory - Adult  Goal: Remains free of injury related to seizures activity  2/2/2025 1630 by Linda Abbott RN  Outcome: Progressing     Problem: Metabolic/Fluid and Electrolytes - Adult  Goal: Electrolytes maintained within normal limits  2/2/2025 1630 by Linda Abbott RN  Outcome: Progressing  Flowsheets (Taken 2/2/2025 1630)  Electrolytes maintained within normal limits:   Monitor labs and assess patient for signs and symptoms of electrolyte imbalances   Administer electrolyte replacement as ordered     Problem: Self Harm/Suicidality  Goal: Will have no self-injury during hospital stay  Description: INTERVENTIONS:  1.  Ensure constant observer at bedside with Q15M safety checks  2.  Maintain a safe environment  3.  Secure patient belongings  4.  Ensure family/visitors adhere to safety recommendations  5.  Ensure safety tray has been added to patient's diet order  6.  Every shift and PRN: Re-assess suicidal risk via Frequent Screener    2/2/2025 1630 by Linda Abbott RN  Outcome: Progressing     
  Problem: Discharge Planning  Goal: Discharge to home or other facility with appropriate resources  Outcome: Progressing     Problem: Neurosensory - Adult  Goal: Absence of seizures  Outcome: Progressing     Problem: Neurosensory - Adult  Goal: Remains free of injury related to seizures activity  Outcome: Progressing     Problem: Metabolic/Fluid and Electrolytes - Adult  Goal: Electrolytes maintained within normal limits  Outcome: Not Progressing     
  Problem: Discharge Planning  Goal: Discharge to home or other facility with appropriate resources  Outcome: Progressing     Problem: Neurosensory - Adult  Goal: Absence of seizures  Outcome: Progressing     Problem: Neurosensory - Adult  Goal: Remains free of injury related to seizures activity  Outcome: Progressing     Problem: Metabolic/Fluid and Electrolytes - Adult  Goal: Electrolytes maintained within normal limits  Outcome: Progressing     Problem: Skin/Tissue Integrity - Adult  Goal: Skin integrity remains intact  Outcome: Progressing     Problem: Musculoskeletal - Adult  Goal: Return mobility to safest level of function  Outcome: Progressing     Problem: Musculoskeletal - Adult  Goal: Maintain proper alignment of affected body part  Outcome: Progressing     Problem: Gastrointestinal - Adult  Goal: Minimal or absence of nausea and vomiting  Outcome: Progressing     Problem: Gastrointestinal - Adult  Goal: Maintains or returns to baseline bowel function  Outcome: Progressing     Problem: Genitourinary - Adult  Goal: Absence of urinary retention  Outcome: Progressing     Problem: Self Harm/Suicidality  Goal: Will have no self-injury during hospital stay  Outcome: Progressing     Problem: Skin/Tissue Integrity  Goal: Skin integrity remains intact  Outcome: Progressing     
  Problem: Discharge Planning  Goal: Discharge to home or other facility with appropriate resources  Outcome: Progressing     Problem: Neurosensory - Adult  Goal: Absence of seizures  Outcome: Progressing  Goal: Remains free of injury related to seizures activity  Outcome: Progressing     Problem: Metabolic/Fluid and Electrolytes - Adult  Goal: Electrolytes maintained within normal limits  Outcome: Progressing     Problem: Self Harm/Suicidality  Goal: Will have no self-injury during hospital stay  Description: INTERVENTIONS:  1.  Ensure constant observer at bedside with Q15M safety checks  2.  Maintain a safe environment  3.  Secure patient belongings  4.  Ensure family/visitors adhere to safety recommendations  5.  Ensure safety tray has been added to patient's diet order  6.  Every shift and PRN: Re-assess suicidal risk via Frequent Screener    Outcome: Progressing     Problem: Skin/Tissue Integrity  Goal: Skin integrity remains intact  Description: 1.  Monitor for areas of redness and/or skin breakdown  2.  Assess vascular access sites hourly  3.  Every 4-6 hours minimum:  Change oxygen saturation probe site  4.  Every 4-6 hours:  If on nasal continuous positive airway pressure, respiratory therapy assess nares and determine need for appliance change or resting period  Outcome: Progressing     Problem: Pain  Goal: Verbalizes/displays adequate comfort level or baseline comfort level  Outcome: Progressing     Problem: Safety - Adult  Goal: Free from fall injury  Outcome: Progressing     
  Problem: Discharge Planning  Goal: Discharge to home or other facility with appropriate resources  Outcome: Progressing  Flowsheets (Taken 1/31/2025 2000 by Sawyer Faulkner, RN)  Discharge to home or other facility with appropriate resources:   Identify discharge learning needs (meds, wound care, etc)   Identify barriers to discharge with patient and caregiver     Problem: Neurosensory - Adult  Goal: Absence of seizures  2/3/2025 1237 by Rashmi Castellon RN  Outcome: Progressing  Flowsheets (Taken 2/3/2025 1237)  Absence of seizures:   Monitor for seizure activity.  If seizure occurs, document type and location of movements and any associated apnea   If seizure occurs, turn head to side and suction secretions as needed  2/3/2025 0504 by Gino Lin RN  Outcome: Progressing  Goal: Remains free of injury related to seizures activity  Outcome: Progressing  Flowsheets (Taken 1/31/2025 2000 by Sawyer Faulkner, RN)  Remains free of injury related to seizure activity:   Maintain airway, patient safety  and administer oxygen as ordered   Monitor patient for seizure activity, document and report duration and description of seizure to Licensed Independent Practitioner   Seizure pads on all 4 side rails   If seizure occurs, turn patient to side and suction secretions as needed     Problem: Neurosensory - Adult  Goal: Remains free of injury related to seizures activity  Outcome: Progressing  Flowsheets (Taken 1/31/2025 2000 by Sawyer Faulkner, RN)  Remains free of injury related to seizure activity:   Maintain airway, patient safety  and administer oxygen as ordered   Monitor patient for seizure activity, document and report duration and description of seizure to Licensed Independent Practitioner   Seizure pads on all 4 side rails   If seizure occurs, turn patient to side and suction secretions as needed     Problem: Metabolic/Fluid and Electrolytes - Adult  Goal: Electrolytes maintained 
  Problem: Neurosensory - Adult  Goal: Absence of seizures  1/28/2025 1535 by Rasheeda Carmona RN  Outcome: Progressing     Problem: Self Harm/Suicidality  Goal: Will have no self-injury during hospital stay  Description: INTERVENTIONS:  1.  Ensure constant observer at bedside with Q15M safety checks  2.  Maintain a safe environment  3.  Secure patient belongings  4.  Ensure family/visitors adhere to safety recommendations  5.  Ensure safety tray has been added to patient's diet order  6.  Every shift and PRN: Re-assess suicidal risk via Frequent Screener    Outcome: Progressing  Note: Pt free of self-injury. 1:1 in patients room.     Problem: Metabolic/Fluid and Electrolytes - Adult  Goal: Electrolytes maintained within normal limits  1/28/2025 1535 by Rasheeda Carmona RN  Outcome: Progressing    Problem: Skin/Tissue Integrity  Goal: Skin integrity remains intact  Description: 1.  Monitor for areas of redness and/or skin breakdown  2.  Assess vascular access sites hourly  3.  Every 4-6 hours minimum:  Change oxygen saturation probe site  4.  Every 4-6 hours:  If on nasal continuous positive airway pressure, respiratory therapy assess nares and determine need for appliance change or resting period  Outcome: Progressing  Note: Pts skin integrity remains intact        
  Problem: Neurosensory - Adult  Goal: Absence of seizures  2/3/2025 0504 by Gino Lin RN  Outcome: Progressing     Problem: Metabolic/Fluid and Electrolytes - Adult  Goal: Electrolytes maintained within normal limits  2/3/2025 0504 by Gino Lin, RN  Outcome: Progressing     
  Problem: Safety - Medical Restraint  Goal: Remains free of injury from restraints (Restraint for Interference with Medical Device)  Description: INTERVENTIONS:  1. Determine that other, less restrictive measures have been tried or would not be effective before applying the restraint  2. Evaluate the patient's condition at the time of restraint application  3. Inform patient/family regarding the reason for restraint  4. Q2H: Monitor safety, psychosocial status, comfort, nutrition and hydration  1/31/2025 1520 by Victorina Davila RN  Outcome: Completed  1/31/2025 0926 by Victorina Davila RN  Outcome: Not Progressing  1/31/2025 0138 by Sindi oSsa RN  Outcome: Progressing  Flowsheets (Taken 1/31/2025 0138 by Blaze Hatfield)  Remains free of injury from restraints (restraint for interference with medical device): --  Note: Bilat soft wrist restraints continue     Problem: Confusion  Goal: Confusion, delirium, dementia, or psychosis is improved or at baseline  Description: INTERVENTIONS:  1. Assess for possible contributors to thought disturbance, including medications, impaired vision or hearing, underlying metabolic abnormalities, dehydration, psychiatric diagnoses, and notify attending LIP  2. Orma high risk fall precautions, as indicated  3. Provide frequent short contacts to provide reality reorientation, refocusing and direction  4. Decrease environmental stimuli, including noise as appropriate  5. Monitor and intervene to maintain adequate nutrition, hydration, elimination, sleep and activity  6. If unable to ensure safety without constant attention obtain sitter and review sitter guidelines with assigned personnel  7. Initiate Psychosocial CNS and Spiritual Care consult, as indicated  1/31/2025 0926 by Victorina Davila RN  Outcome: Not Progressing  1/31/2025 0138 by Sindi Sosa RN  Outcome: Progressing  Flowsheets (Taken 1/31/2025 0138)  Effect of thought disturbance (confusion, delirium, dementia, or 
Please fill out the MRI screening form, in [facility], completely.   Please hit the nurse/doc complete button.  Please have the patient changed into a gown. Only socks and underwear may remain on.   Please remove all piercings and jewelry.  Make sure the patient has meds ready for pain and claustrophobia.     Once the MRI screening form is completed and reviewed by MRI staff, we will call with an appointment time.     Any questions, please call [phone].  Thank you!  
bedside, suicide precautions observed     Problem: Skin/Tissue Integrity  Goal: Skin integrity remains intact  Description: 1.  Monitor for areas of redness and/or skin breakdown  2.  Assess vascular access sites hourly  3.  Every 4-6 hours minimum:  Change oxygen saturation probe site  4.  Every 4-6 hours:  If on nasal continuous positive airway pressure, respiratory therapy assess nares and determine need for appliance change or resting period  Outcome: Progressing  Flowsheets (Taken 1/31/2025 0138)  Skin Integrity Remains Intact: Monitor for areas of redness and/or skin breakdown  Note: Perirectal area reddened, otherwise no breakdown noted     Problem: Pain  Goal: Verbalizes/displays adequate comfort level or baseline comfort level  Outcome: Progressing  Flowsheets (Taken 1/31/2025 0138)  Verbalizes/displays adequate comfort level or baseline comfort level:   Encourage patient to monitor pain and request assistance   Assess pain using appropriate pain scale   Administer analgesics based on type and severity of pain and evaluate response  Note: Pt medicated for pain once this shift and pt does not complain of pain after receiving dose     Problem: Safety - Adult  Goal: Free from fall injury  Outcome: Progressing  Flowsheets (Taken 1/31/2025 0138)  Free From Fall Injury: Instruct family/caregiver on patient safety  Note: Bilat soft wrist restraints continued to prevent accidental dislodging of tubings, pt requires very frequent monitoring due to continued agitation     Problem: ABCDS Injury Assessment  Goal: Absence of physical injury  Outcome: Progressing     Problem: Safety - Medical Restraint  Goal: Remains free of injury from restraints (Restraint for Interference with Medical Device)  Description: INTERVENTIONS:  1. Determine that other, less restrictive measures have been tried or would not be effective before applying the restraint  2. Evaluate the patient's condition at the time of restraint 
injury related to seizure activity:   Maintain airway, patient safety  and administer oxygen as ordered   If seizure occurs, turn patient to side and suction secretions as needed   Monitor patient for seizure activity, document and report duration and description of seizure to Licensed Independent Practitioner  Taken 1/30/2025 0000 by Tomasa Black RN  Remains free of injury related to seizure activity:   Maintain airway, patient safety  and administer oxygen as ordered   Monitor patient for seizure activity, document and report duration and description of seizure to Licensed Independent Practitioner   If seizure occurs, turn patient to side and suction secretions as needed     Problem: Metabolic/Fluid and Electrolytes - Adult  Goal: Electrolytes maintained within normal limits  Outcome: Progressing  Flowsheets  Taken 1/30/2025 0400 by Tomasa Black RN  Electrolytes maintained within normal limits:   Monitor labs and assess patient for signs and symptoms of electrolyte imbalances   Administer electrolyte replacement as ordered   Monitor response to electrolyte replacements, including repeat lab results as appropriate  Taken 1/30/2025 0000 by Tomasa Black RN  Electrolytes maintained within normal limits:   Monitor labs and assess patient for signs and symptoms of electrolyte imbalances   Administer electrolyte replacement as ordered   Monitor response to electrolyte replacements, including repeat lab results as appropriate     Problem: Self Harm/Suicidality  Goal: Will have no self-injury during hospital stay  Description: INTERVENTIONS:  1.  Ensure constant observer at bedside with Q15M safety checks  2.  Maintain a safe environment  3.  Secure patient belongings  4.  Ensure family/visitors adhere to safety recommendations  5.  Ensure safety tray has been added to patient's diet order  6.  Every shift and PRN: Re-assess suicidal risk via Frequent Screener    Outcome: Progressing  Flowsheets (Taken 1/30/2025 0400 
appliance change or resting period  Recent Flowsheet Documentation  Taken 1/30/2025 0000 by Tomasa Black RN  Skin Integrity Remains Intact: Monitor for areas of redness and/or skin breakdown  Taken 1/29/2025 2000 by Tomasa Black RN  Skin Integrity Remains Intact: Monitor for areas of redness and/or skin breakdown     Problem: Pain  Goal: Verbalizes/displays adequate comfort level or baseline comfort level  Recent Flowsheet Documentation  Taken 1/30/2025 0000 by Tomasa Black RN  Verbalizes/displays adequate comfort level or baseline comfort level:   Encourage patient to monitor pain and request assistance   Assess pain using appropriate pain scale   Administer analgesics based on type and severity of pain and evaluate response   Implement non-pharmacological measures as appropriate and evaluate response  Taken 1/29/2025 2000 by Tomasa Black RN  Verbalizes/displays adequate comfort level or baseline comfort level:   Encourage patient to monitor pain and request assistance   Assess pain using appropriate pain scale   Administer analgesics based on type and severity of pain and evaluate response   Implement non-pharmacological measures as appropriate and evaluate response     Problem: Safety - Adult  Goal: Free from fall injury  Recent Flowsheet Documentation  Taken 1/29/2025 2000 by Tomasa Black RN  Free From Fall Injury: Instruct family/caregiver on patient safety     
observer at bedside with Q15M safety checks   Maintain a safe environment   Ensure family/visitors adhere to safety recommendations  Note: Pt with sitter at bedside, suicide precautions observed     Problem: Skin/Tissue Integrity  Goal: Skin integrity remains intact  Description: 1.  Monitor for areas of redness and/or skin breakdown  2.  Assess vascular access sites hourly  3.  Every 4-6 hours minimum:  Change oxygen saturation probe site  4.  Every 4-6 hours:  If on nasal continuous positive airway pressure, respiratory therapy assess nares and determine need for appliance change or resting period  1/31/2025 0926 by Victorina Davila, RN  Outcome: Progressing  Flowsheets (Taken 1/31/2025 0715)  Skin Integrity Remains Intact:   Monitor for areas of redness and/or skin breakdown   Assess vascular access sites hourly  1/31/2025 0138 by Sindi Sosa RN  Outcome: Progressing  Flowsheets (Taken 1/31/2025 0138)  Skin Integrity Remains Intact: Monitor for areas of redness and/or skin breakdown  Note: Perirectal area reddened, otherwise no breakdown noted     Problem: Pain  Goal: Verbalizes/displays adequate comfort level or baseline comfort level  1/31/2025 0926 by Victorina Davila RN  Outcome: Progressing  1/31/2025 0138 by Sindi Sosa RN  Outcome: Progressing  Flowsheets (Taken 1/31/2025 0138)  Verbalizes/displays adequate comfort level or baseline comfort level:   Encourage patient to monitor pain and request assistance   Assess pain using appropriate pain scale   Administer analgesics based on type and severity of pain and evaluate response  Note: Pt medicated for pain once this shift and pt does not complain of pain after receiving dose     Problem: Safety - Adult  Goal: Free from fall injury  1/31/2025 0926 by Victorina Davila RN  Outcome: Progressing  1/31/2025 0138 by Sindi Sosa RN  Outcome: Progressing  Flowsheets (Taken 1/31/2025 0138)  Free From Fall Injury: Instruct family/caregiver on patient

## 2025-02-04 LAB
EKG ATRIAL RATE: 91 BPM
EKG P AXIS: 47 DEGREES
EKG P-R INTERVAL: 156 MS
EKG Q-T INTERVAL: 382 MS
EKG QRS DURATION: 82 MS
EKG QTC CALCULATION (BAZETT): 469 MS
EKG R AXIS: -10 DEGREES
EKG T AXIS: 3 DEGREES
EKG VENTRICULAR RATE: 91 BPM

## (undated) DEVICE — GLOVE SURG SZ 65 THK91MIL LTX FREE SYN POLYISOPRENE

## (undated) DEVICE — Z DISCONTINUED NO SUB IDED GAUZE PK 2INX3YD ST XR 34733010 2X3GZ

## (undated) DEVICE — BIT DRL L50MM DIA1.5MM STP L20MM RED S STL TWST J NOTCH

## (undated) DEVICE — POSITIONER HD W8XH4XL8.5IN RASPBERRY FOAM SLT

## (undated) DEVICE — 10FR FRAZIER SUCTION HANDLE: Brand: CARDINAL HEALTH

## (undated) DEVICE — PREP SOL PVP IODINE 4%  4 OZ/BTL

## (undated) DEVICE — CUTTER WIRE FOR ORTHOFIX TRUELOK COMP INSTR

## (undated) DEVICE — ELECTRODE ELECSURG NDL 2.8 INX7.2 CM COAT INSUL EDGE

## (undated) DEVICE — PROTECTOR EYE PT SELF ADH NS OPT GRD LF

## (undated) DEVICE — SUTURE CHROMIC GUT SZ 3-0 L27IN ABSRB BRN L19MM FS-2 3/8 636H

## (undated) DEVICE — KENDALL SCD EXPRESS SLEEVES, KNEE LENGTH, MEDIUM: Brand: KENDALL SCD

## (undated) DEVICE — DRAPE,REIN 53X77,STERILE: Brand: MEDLINE

## (undated) DEVICE — MAGNETIC DRAPE: Brand: DEVON

## (undated) DEVICE — GAUZE,SPONGE,FLUFF,6"X6.75",STRL,5/TRAY: Brand: MEDLINE

## (undated) DEVICE — TOOTHPICK/CAP ST

## (undated) DEVICE — TUBING SUCT 12FR MAL ALUM SHFT FN CAP VENT UNIV CONN W/ OBT

## (undated) DEVICE — SVMMC HD AND NK PK

## (undated) DEVICE — SUTURE PROL SZ 2-0 L30IN NONABSORBABLE BLU L26MM SH 1/2 CIR 8833H